# Patient Record
Sex: MALE | URBAN - METROPOLITAN AREA
[De-identification: names, ages, dates, MRNs, and addresses within clinical notes are randomized per-mention and may not be internally consistent; named-entity substitution may affect disease eponyms.]

---

## 2017-06-06 ENCOUNTER — HOSPITAL ENCOUNTER (OUTPATIENT)
Facility: CLINIC | Age: 57
Discharge: HOME | End: 2017-06-06
Attending: COLON & RECTAL SURGERY | Admitting: COLON & RECTAL SURGERY

## 2017-06-06 VITALS
OXYGEN SATURATION: 96 % | BODY MASS INDEX: 30.16 KG/M2 | WEIGHT: 199 LBS | RESPIRATION RATE: 16 BRPM | HEIGHT: 68 IN | SYSTOLIC BLOOD PRESSURE: 163 MMHG | DIASTOLIC BLOOD PRESSURE: 116 MMHG

## 2017-06-06 LAB — COLONOSCOPY: NORMAL

## 2017-06-06 PROCEDURE — 88305 TISSUE EXAM BY PATHOLOGIST: CPT | Mod: 26 | Performed by: COLON & RECTAL SURGERY

## 2017-06-06 PROCEDURE — 25000128 H RX IP 250 OP 636: Performed by: COLON & RECTAL SURGERY

## 2017-06-06 PROCEDURE — G0500 MOD SEDAT ENDO SERVICE >5YRS: HCPCS | Performed by: COLON & RECTAL SURGERY

## 2017-06-06 PROCEDURE — 25000125 ZZHC RX 250: Performed by: COLON & RECTAL SURGERY

## 2017-06-06 PROCEDURE — 45385 COLONOSCOPY W/LESION REMOVAL: CPT | Performed by: COLON & RECTAL SURGERY

## 2017-06-06 PROCEDURE — 88305 TISSUE EXAM BY PATHOLOGIST: CPT | Performed by: COLON & RECTAL SURGERY

## 2017-06-06 RX ORDER — ONDANSETRON 2 MG/ML
4 INJECTION INTRAMUSCULAR; INTRAVENOUS EVERY 6 HOURS PRN
Status: DISCONTINUED | OUTPATIENT
Start: 2017-06-06 | End: 2017-06-06 | Stop reason: HOSPADM

## 2017-06-06 RX ORDER — LIDOCAINE 40 MG/G
CREAM TOPICAL
Status: DISCONTINUED | OUTPATIENT
Start: 2017-06-06 | End: 2017-06-06 | Stop reason: HOSPADM

## 2017-06-06 RX ORDER — NALOXONE HYDROCHLORIDE 0.4 MG/ML
.1-.4 INJECTION, SOLUTION INTRAMUSCULAR; INTRAVENOUS; SUBCUTANEOUS
Status: DISCONTINUED | OUTPATIENT
Start: 2017-06-06 | End: 2017-06-06 | Stop reason: HOSPADM

## 2017-06-06 RX ORDER — ONDANSETRON 2 MG/ML
4 INJECTION INTRAMUSCULAR; INTRAVENOUS
Status: DISCONTINUED | OUTPATIENT
Start: 2017-06-06 | End: 2017-06-06 | Stop reason: HOSPADM

## 2017-06-06 RX ORDER — FENTANYL CITRATE 50 UG/ML
INJECTION, SOLUTION INTRAMUSCULAR; INTRAVENOUS PRN
Status: DISCONTINUED | OUTPATIENT
Start: 2017-06-06 | End: 2017-06-06 | Stop reason: HOSPADM

## 2017-06-06 RX ORDER — FLUMAZENIL 0.1 MG/ML
0.2 INJECTION, SOLUTION INTRAVENOUS
Status: DISCONTINUED | OUTPATIENT
Start: 2017-06-06 | End: 2017-06-06 | Stop reason: HOSPADM

## 2017-06-06 RX ORDER — ONDANSETRON 4 MG/1
4 TABLET, ORALLY DISINTEGRATING ORAL EVERY 6 HOURS PRN
Status: DISCONTINUED | OUTPATIENT
Start: 2017-06-06 | End: 2017-06-06 | Stop reason: HOSPADM

## 2017-06-06 NOTE — DISCHARGE INSTRUCTIONS
Understanding Colon and Rectal Polyps     The colon has a smooth lining composed of millions of cells.     The colon (also called the large intestine) is a muscular tube that forms the last part of the digestive tract. It absorbs water and stores food waste. The colon is about 4 to 6 feet long. The rectum is the last 6 inches of the colon. The colon and rectum have a smooth lining composed of millions of cells. Changes in these cells can lead to growths in the colon that can become cancerous and should be removed.     When the Colon Lining Changes  Changes that occur in the cells that line the colon or rectum can lead to growths called polyps. Over a period of years, polyps can turn cancerous. Removing polyps early may prevent cancer from ever forming.      Polyps  Polyps are fleshy clumps of tissue that form on the lining of the colon or rectum. Small polyps are usually benign (not cancerous). However, over time, cells in a polyp can change and become cancerous. The larger a polyp grows, the more likely this is to happen. Also, certain types of polyps known as adenomatous polyps are considered premalignant. This means that they will almost always become cancerous if they re not removed.          Cancer  Almost all colorectal cancers start when polyp cells begin growing abnormally. As a cancerous tumor grows, it may involve more and more of the colon or rectum. In time, cancer can also grow beyond the colon or rectum and spread to nearby organs or to glands called lymph nodes. The cells can also travel to other parts of the body. This is known as metastasis. The earlier a cancerous tumor is removed, the better the chance of preventing its spread.        2356-3372 [facility], [address] [place] [address], [place] [address]. All rights reserved. This information is not intended as a substitute for professional medical care. Always follow your healthcare professional's instructions.

## 2017-06-06 NOTE — IP AVS SNAPSHOT
MRN:[id number]                      After Visit Summary   6/6/2017    Fredy Post    MRN: [id number]           Thank you!     Thank you for choosing [facility] for your care. Our goal is always to provide you with excellent care. Hearing back from our patients is one way we can continue to improve our services. Please take a few minutes to complete the written survey that you may receive in the mail after you visit. If you would like to speak to someone directly about your visit please contact Patient Relations at [phone]. Thank you!          Patient Information     Date Of Birth          1960        About your hospital stay     You were admitted on:  June 6, 2017 You last received care in the:  [facility]    You were discharged on:  June 6, 2017       Who to Call     For medical emergencies, please call 911.  For non-urgent questions about your medical care, please call your primary care provider or clinic, None  For questions related to your surgery, please call your surgery clinic        Attending Provider     Provider Specialty    Dori Barrios MD Colon and Rectal Surgery       Primary Care Provider    Physician No Ref-Primary      Further instructions from your care team         Understanding Colon and Rectal Polyps     The colon has a smooth lining composed of millions of cells.     The colon (also called the large intestine) is a muscular tube that forms the last part of the digestive tract. It absorbs water and stores food waste. The colon is about 4 to 6 feet long. The rectum is the last 6 inches of the colon. The colon and rectum have a smooth lining composed of millions of cells. Changes in these cells can lead to growths in the colon that can become cancerous and should be removed.     When the Colon Lining Changes  Changes that occur in the cells that line the colon or rectum can lead to growths called polyps. Over a period of  "years, polyps can turn cancerous. Removing polyps early may prevent cancer from ever forming.      Polyps  Polyps are fleshy clumps of tissue that form on the lining of the colon or rectum. Small polyps are usually benign (not cancerous). However, over time, cells in a polyp can change and become cancerous. The larger a polyp grows, the more likely this is to happen. Also, certain types of polyps known as adenomatous polyps are considered premalignant. This means that they will almost always become cancerous if they re not removed.          Cancer  Almost all colorectal cancers start when polyp cells begin growing abnormally. As a cancerous tumor grows, it may involve more and more of the colon or rectum. In time, cancer can also grow beyond the colon or rectum and spread to nearby organs or to glands called lymph nodes. The cells can also travel to other parts of the body. This is known as metastasis. The earlier a cancerous tumor is removed, the better the chance of preventing its spread.        7455-6590 [place] [address]. All rights reserved. This information is not intended as a substitute for professional medical care. Always follow your healthcare professional's instructions.    Pending Results     No orders found from 6/4/2017 to 6/7/2017.            Admission Information     Date & Time Provider Department Dept. Phone    6/6/2017 Dori Barrios MD [facility] Endoscopy [phone]      Your Vitals Were     Blood Pressure Respirations Height Weight Pulse Oximetry BMI (Body Mass Index)    153/113 14 1.727 m (5' 8\") 90.3 kg (199 lb) 95% 30.26 kg/m2      [employer] Information     [email or web address] lets you send messages to your doctor, view your test results, renew your prescriptions, schedule appointments and more. To sign up, go to [email or web address] . Click on \"Log in\" on the left side of the screen, which will take you to the Welcome page. Then click on \"Sign up " "Now\" on the right side of the page.     You will be asked to enter the access code listed below, as well as some personal information. Please follow the directions to create your username and password.     Your access code is: [email or web address]  Expires: 2017 11:17 AM     Your access code will  in 90 days. If you need help or a new code, please call your [facility] or [phone].        Care EveryWhere ID     This is your Care EveryWhere ID. This could be used by other organizations to access your [facility] medical records  [id number]           Review of your medicines      CONTINUE these medicines which have NOT CHANGED        Dose / Directions    ACYCLOVIR PO        Take by mouth daily as needed   Refills:  0                Protect others around you: Learn how to safely use, store and throw away your medicines at www.disposemymeds.org.             Medication List: This is a list of all your medications and when to take them. Check marks below indicate your daily home schedule. Keep this list as a reference.      Medications           Morning Afternoon Evening Bedtime As Needed    ACYCLOVIR PO   Take by mouth daily as needed                                  "

## 2017-06-06 NOTE — H&P
Pre-Endoscopy History and Physical     Fredy Post MRN# [id number]   YOB: 1960 Age: 56 year old     Date of Procedure: 6/6/2017  Primary care provider: No Ref-Primary, Physician  Type of Endoscopy: colonoscopy  Reason for Procedure: screening  Type of Anesthesia Anticipated: Moderate Sedation    HPI:    Fredy is a 56 year old male who will be undergoing the above procedure.      A history and physical has been performed. The patient's medications and allergies have been reviewed. The risks and benefits of the procedure and the sedation options and risks were discussed with the patient.  All questions were answered and informed consent was obtained.      He denies a personal or family history of anesthesia complications or bleeding disorders.     No Known Allergies     Prior to Admission Medications   Prescriptions Last Dose Informant Patient Reported? Taking?   ACYCLOVIR PO Past Month  Yes Yes   Sig: Take by mouth daily as needed      Facility-Administered Medications: None       There is no problem list on file for this patient.       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26     Genital herpes      Schwannoma     diagnosed at age 32, excision performed        Past Surgical History:   Procedure Laterality Date     BACK SURGERY  04/23/2002       Social History   Substance Use Topics     Smoking status: Former Smoker     Types: Cigarettes     Quit date: 1987     Smokeless tobacco: Not on file     Alcohol use Yes      Comment: wine - about 2 drinks per week       Family History   Problem Relation Age of Onset     Hypertension Mother      Heart Failure Father      Hypertension Father      Ovarian Cancer Sister      CEREBROVASCULAR DISEASE Maternal Grandmother      DIABETES Paternal Grandfather      Colon Cancer No family hx of        REVIEW OF SYSTEMS:     5 point ROS negative except as noted above in HPI, including Gen., Resp., CV, GI &   "system review.      PHYSICAL EXAM:   Ht 1.727 m (5' 8\")  Wt 90.3 kg (199 lb)  BMI 30.26 kg/m2 Estimated body mass index is 30.26 kg/(m^2) as calculated from the following:    Height as of this encounter: 1.727 m (5' 8\").    Weight as of this encounter: 90.3 kg (199 lb).   GENERAL APPEARANCE: healthy and alert  MENTAL STATUS: alert  AIRWAY EXAM: Mallampatti Class II (visualization of the soft palate, fauces, and uvula)  RESP: lungs clear to auscultation - no rales, rhonchi or wheezes  CV: regular rates and rhythm      DIAGNOSTICS:    Not indicated      IMPRESSION   ASA Class 2 - Mild systemic disease        PLAN:       Plan for colonoscopy. We discussed the risks, benefits and alternatives and the patient wished to proceed.    The above has been forwarded to the consulting provider.      Signed Electronically by: Dori Barrios MD  June 6, 2017    "

## 2017-06-06 NOTE — OP NOTE
See Provation Note In Chart    Dori Barrios MD  [facility]  Office Phone # [phone]

## 2017-06-07 LAB — COPATH REPORT: NORMAL

## 2025-03-16 ENCOUNTER — HOSPITAL ENCOUNTER (INPATIENT)
Facility: CLINIC | Age: 65
LOS: 2 days | Discharge: HOME | End: 2025-03-18
Attending: PHYSICIAN ASSISTANT | Admitting: STUDENT IN AN ORGANIZED HEALTH CARE EDUCATION/TRAINING PROGRAM
Payer: COMMERCIAL

## 2025-03-16 ENCOUNTER — APPOINTMENT (OUTPATIENT)
Dept: GENERAL RADIOLOGY | Facility: CLINIC | Age: 65
End: 2025-03-16
Attending: PHYSICIAN ASSISTANT
Payer: COMMERCIAL

## 2025-03-16 DIAGNOSIS — I21.4 NSTEMI (NON-ST ELEVATED MYOCARDIAL INFARCTION) (H): ICD-10-CM

## 2025-03-16 DIAGNOSIS — R55 NEAR SYNCOPE: ICD-10-CM

## 2025-03-16 LAB
ALBUMIN SERPL BCG-MCNC: 4.1 G/DL (ref 3.5–5.2)
ALP SERPL-CCNC: 149 U/L (ref 40–150)
ALT SERPL W P-5'-P-CCNC: 43 U/L (ref 0–70)
ANION GAP SERPL CALCULATED.3IONS-SCNC: 12 MMOL/L (ref 7–15)
AST SERPL W P-5'-P-CCNC: 55 U/L (ref 0–45)
ATRIAL RATE - MUSE: 103 BPM
BASOPHILS # BLD AUTO: 0.1 10E3/UL (ref 0–0.2)
BASOPHILS NFR BLD AUTO: 0 %
BILIRUB SERPL-MCNC: 0.8 MG/DL
BUN SERPL-MCNC: 14.2 MG/DL (ref 8–23)
CALCIUM SERPL-MCNC: 8.9 MG/DL (ref 8.8–10.4)
CHLORIDE SERPL-SCNC: 104 MMOL/L (ref 98–107)
CREAT SERPL-MCNC: 1.42 MG/DL (ref 0.67–1.17)
D DIMER PPP FEU-MCNC: 0.45 UG/ML FEU (ref 0–0.5)
DIASTOLIC BLOOD PRESSURE - MUSE: NORMAL MMHG
EGFRCR SERPLBLD CKD-EPI 2021: 56 ML/MIN/1.73M2
EOSINOPHIL # BLD AUTO: 0.1 10E3/UL (ref 0–0.7)
EOSINOPHIL NFR BLD AUTO: 1 %
ERYTHROCYTE [DISTWIDTH] IN BLOOD BY AUTOMATED COUNT: 12.6 % (ref 10–15)
ETHANOL SERPL-MCNC: <0.01 G/DL
GLUCOSE SERPL-MCNC: 152 MG/DL (ref 70–99)
HCO3 SERPL-SCNC: 23 MMOL/L (ref 22–29)
HCT VFR BLD AUTO: 44.3 % (ref 40–53)
HGB BLD-MCNC: 16.1 G/DL (ref 13.3–17.7)
HOLD SPECIMEN: NORMAL
IMM GRANULOCYTES # BLD: 0.1 10E3/UL
IMM GRANULOCYTES NFR BLD: 1 %
INTERPRETATION ECG - MUSE: NORMAL
LYMPHOCYTES # BLD AUTO: 2.9 10E3/UL (ref 0.8–5.3)
LYMPHOCYTES NFR BLD AUTO: 23 %
MCH RBC QN AUTO: 32 PG (ref 26.5–33)
MCHC RBC AUTO-ENTMCNC: 36.3 G/DL (ref 31.5–36.5)
MCV RBC AUTO: 88 FL (ref 78–100)
MONOCYTES # BLD AUTO: 0.6 10E3/UL (ref 0–1.3)
MONOCYTES NFR BLD AUTO: 4 %
NEUTROPHILS # BLD AUTO: 9.1 10E3/UL (ref 1.6–8.3)
NEUTROPHILS NFR BLD AUTO: 71 %
NRBC # BLD AUTO: 0 10E3/UL
NRBC BLD AUTO-RTO: 0 /100
NT-PROBNP SERPL-MCNC: 372 PG/ML (ref 0–900)
P AXIS - MUSE: 51 DEGREES
PLATELET # BLD AUTO: 239 10E3/UL (ref 150–450)
POTASSIUM SERPL-SCNC: 3.8 MMOL/L (ref 3.4–5.3)
PR INTERVAL - MUSE: 134 MS
PROT SERPL-MCNC: 7.3 G/DL (ref 6.4–8.3)
QRS DURATION - MUSE: 82 MS
QT - MUSE: 366 MS
QTC - MUSE: 479 MS
R AXIS - MUSE: 34 DEGREES
RBC # BLD AUTO: 5.03 10E6/UL (ref 4.4–5.9)
SODIUM SERPL-SCNC: 139 MMOL/L (ref 135–145)
SYSTOLIC BLOOD PRESSURE - MUSE: NORMAL MMHG
T AXIS - MUSE: 66 DEGREES
TROPONIN T SERPL HS-MCNC: 245 NG/L
TROPONIN T SERPL HS-MCNC: 288 NG/L
TROPONIN T SERPL HS-MCNC: 288 NG/L
UFH PPP CHRO-ACNC: 0.37 IU/ML
VENTRICULAR RATE- MUSE: 103 BPM
WBC # BLD AUTO: 12.9 10E3/UL (ref 4–11)

## 2025-03-16 PROCEDURE — 96374 THER/PROPH/DIAG INJ IV PUSH: CPT | Mod: 59

## 2025-03-16 PROCEDURE — 84155 ASSAY OF PROTEIN SERUM: CPT | Performed by: PHYSICIAN ASSISTANT

## 2025-03-16 PROCEDURE — 36415 COLL VENOUS BLD VENIPUNCTURE: CPT | Performed by: EMERGENCY MEDICINE

## 2025-03-16 PROCEDURE — 36415 COLL VENOUS BLD VENIPUNCTURE: CPT | Performed by: PHYSICIAN ASSISTANT

## 2025-03-16 PROCEDURE — 84484 ASSAY OF TROPONIN QUANT: CPT | Performed by: STUDENT IN AN ORGANIZED HEALTH CARE EDUCATION/TRAINING PROGRAM

## 2025-03-16 PROCEDURE — 85048 AUTOMATED LEUKOCYTE COUNT: CPT | Performed by: EMERGENCY MEDICINE

## 2025-03-16 PROCEDURE — 250N000013 HC RX MED GY IP 250 OP 250 PS 637: Performed by: STUDENT IN AN ORGANIZED HEALTH CARE EDUCATION/TRAINING PROGRAM

## 2025-03-16 PROCEDURE — 82077 ASSAY SPEC XCP UR&BREATH IA: CPT | Performed by: PHYSICIAN ASSISTANT

## 2025-03-16 PROCEDURE — 71046 X-RAY EXAM CHEST 2 VIEWS: CPT

## 2025-03-16 PROCEDURE — 99285 EMERGENCY DEPT VISIT HI MDM: CPT | Mod: 25

## 2025-03-16 PROCEDURE — 250N000011 HC RX IP 250 OP 636: Performed by: PHYSICIAN ASSISTANT

## 2025-03-16 PROCEDURE — 36415 COLL VENOUS BLD VENIPUNCTURE: CPT | Performed by: STUDENT IN AN ORGANIZED HEALTH CARE EDUCATION/TRAINING PROGRAM

## 2025-03-16 PROCEDURE — 85520 HEPARIN ASSAY: CPT | Performed by: STUDENT IN AN ORGANIZED HEALTH CARE EDUCATION/TRAINING PROGRAM

## 2025-03-16 PROCEDURE — 99223 1ST HOSP IP/OBS HIGH 75: CPT | Performed by: STUDENT IN AN ORGANIZED HEALTH CARE EDUCATION/TRAINING PROGRAM

## 2025-03-16 PROCEDURE — 83880 ASSAY OF NATRIURETIC PEPTIDE: CPT | Performed by: PHYSICIAN ASSISTANT

## 2025-03-16 PROCEDURE — 85004 AUTOMATED DIFF WBC COUNT: CPT | Performed by: EMERGENCY MEDICINE

## 2025-03-16 PROCEDURE — 93005 ELECTROCARDIOGRAM TRACING: CPT

## 2025-03-16 PROCEDURE — 85379 FIBRIN DEGRADATION QUANT: CPT | Performed by: PHYSICIAN ASSISTANT

## 2025-03-16 PROCEDURE — 250N000013 HC RX MED GY IP 250 OP 250 PS 637: Performed by: PHYSICIAN ASSISTANT

## 2025-03-16 PROCEDURE — 85025 COMPLETE CBC W/AUTO DIFF WBC: CPT | Performed by: PHYSICIAN ASSISTANT

## 2025-03-16 PROCEDURE — 210N000002 HC R&B HEART CARE

## 2025-03-16 PROCEDURE — 84484 ASSAY OF TROPONIN QUANT: CPT | Performed by: PHYSICIAN ASSISTANT

## 2025-03-16 RX ORDER — ONDANSETRON 4 MG/1
4 TABLET, ORALLY DISINTEGRATING ORAL EVERY 6 HOURS PRN
Status: DISCONTINUED | OUTPATIENT
Start: 2025-03-16 | End: 2025-03-17

## 2025-03-16 RX ORDER — CALCIUM CARBONATE 500 MG/1
1000 TABLET, CHEWABLE ORAL 4 TIMES DAILY PRN
Status: DISCONTINUED | OUTPATIENT
Start: 2025-03-16 | End: 2025-03-18 | Stop reason: HOSPADM

## 2025-03-16 RX ORDER — ATORVASTATIN CALCIUM 40 MG/1
40 TABLET, FILM COATED ORAL EVERY EVENING
Status: DISCONTINUED | OUTPATIENT
Start: 2025-03-16 | End: 2025-03-18 | Stop reason: HOSPADM

## 2025-03-16 RX ORDER — AMOXICILLIN 250 MG
2 CAPSULE ORAL 2 TIMES DAILY PRN
Status: DISCONTINUED | OUTPATIENT
Start: 2025-03-16 | End: 2025-03-18 | Stop reason: HOSPADM

## 2025-03-16 RX ORDER — FAMOTIDINE 20 MG/1
20 TABLET, FILM COATED ORAL 2 TIMES DAILY
Status: DISCONTINUED | OUTPATIENT
Start: 2025-03-16 | End: 2025-03-18 | Stop reason: HOSPADM

## 2025-03-16 RX ORDER — ASPIRIN 81 MG/1
81 TABLET, CHEWABLE ORAL DAILY
Status: DISCONTINUED | OUTPATIENT
Start: 2025-03-17 | End: 2025-03-17

## 2025-03-16 RX ORDER — LISINOPRIL 10 MG/1
10 TABLET ORAL DAILY
Status: ON HOLD | COMMUNITY
End: 2025-03-17

## 2025-03-16 RX ORDER — HEPARIN SODIUM 10000 [USP'U]/100ML
0-5000 INJECTION, SOLUTION INTRAVENOUS CONTINUOUS
Status: DISCONTINUED | OUTPATIENT
Start: 2025-03-16 | End: 2025-03-17

## 2025-03-16 RX ORDER — AMOXICILLIN 250 MG
1 CAPSULE ORAL 2 TIMES DAILY PRN
Status: DISCONTINUED | OUTPATIENT
Start: 2025-03-16 | End: 2025-03-18 | Stop reason: HOSPADM

## 2025-03-16 RX ORDER — LIDOCAINE 40 MG/G
CREAM TOPICAL
Status: DISCONTINUED | OUTPATIENT
Start: 2025-03-16 | End: 2025-03-18 | Stop reason: HOSPADM

## 2025-03-16 RX ORDER — HYDRALAZINE HYDROCHLORIDE 20 MG/ML
10 INJECTION INTRAMUSCULAR; INTRAVENOUS EVERY 6 HOURS PRN
Status: DISCONTINUED | OUTPATIENT
Start: 2025-03-16 | End: 2025-03-17

## 2025-03-16 RX ORDER — ACETAMINOPHEN 650 MG/1
650 SUPPOSITORY RECTAL EVERY 4 HOURS PRN
Status: DISCONTINUED | OUTPATIENT
Start: 2025-03-16 | End: 2025-03-18 | Stop reason: HOSPADM

## 2025-03-16 RX ORDER — PROCHLORPERAZINE MALEATE 10 MG
10 TABLET ORAL EVERY 6 HOURS PRN
Status: DISCONTINUED | OUTPATIENT
Start: 2025-03-16 | End: 2025-03-18 | Stop reason: HOSPADM

## 2025-03-16 RX ORDER — ONDANSETRON 2 MG/ML
4 INJECTION INTRAMUSCULAR; INTRAVENOUS EVERY 6 HOURS PRN
Status: DISCONTINUED | OUTPATIENT
Start: 2025-03-16 | End: 2025-03-17

## 2025-03-16 RX ORDER — ACETAMINOPHEN 325 MG/1
650 TABLET ORAL EVERY 4 HOURS PRN
Status: DISCONTINUED | OUTPATIENT
Start: 2025-03-16 | End: 2025-03-17

## 2025-03-16 RX ORDER — LISINOPRIL 10 MG/1
10 TABLET ORAL DAILY
Status: DISCONTINUED | OUTPATIENT
Start: 2025-03-17 | End: 2025-03-18 | Stop reason: HOSPADM

## 2025-03-16 RX ORDER — NITROGLYCERIN 0.4 MG/1
0.4 TABLET SUBLINGUAL EVERY 5 MIN PRN
Status: DISCONTINUED | OUTPATIENT
Start: 2025-03-16 | End: 2025-03-17

## 2025-03-16 RX ADMIN — ACETAMINOPHEN 650 MG: 325 TABLET, FILM COATED ORAL at 22:01

## 2025-03-16 RX ADMIN — METOPROLOL TARTRATE 12.5 MG: 25 TABLET, FILM COATED ORAL at 21:56

## 2025-03-16 RX ADMIN — HEPARIN SODIUM 1150 UNITS/HR: 10000 INJECTION, SOLUTION INTRAVENOUS at 17:18

## 2025-03-16 RX ADMIN — NITROGLYCERIN 0.4 MG: 0.4 TABLET SUBLINGUAL at 17:17

## 2025-03-16 RX ADMIN — FAMOTIDINE 20 MG: 20 TABLET, FILM COATED ORAL at 21:56

## 2025-03-16 RX ADMIN — ATORVASTATIN CALCIUM 40 MG: 40 TABLET, FILM COATED ORAL at 21:56

## 2025-03-16 ASSESSMENT — ACTIVITIES OF DAILY LIVING (ADL)
ADLS_ACUITY_SCORE: 41
ADLS_ACUITY_SCORE: 41
ADLS_ACUITY_SCORE: 18
ADLS_ACUITY_SCORE: 18
ADLS_ACUITY_SCORE: 41
ADLS_ACUITY_SCORE: 41
ADLS_ACUITY_SCORE: 18
ADLS_ACUITY_SCORE: 18

## 2025-03-16 ASSESSMENT — COLUMBIA-SUICIDE SEVERITY RATING SCALE - C-SSRS
2. HAVE YOU ACTUALLY HAD ANY THOUGHTS OF KILLING YOURSELF IN THE PAST MONTH?: NO
6. HAVE YOU EVER DONE ANYTHING, STARTED TO DO ANYTHING, OR PREPARED TO DO ANYTHING TO END YOUR LIFE?: NO
1. IN THE PAST MONTH, HAVE YOU WISHED YOU WERE DEAD OR WISHED YOU COULD GO TO SLEEP AND NOT WAKE UP?: NO

## 2025-03-16 NOTE — Clinical Note
Stent deployed in the left anterior descending. Max pressure = 12 atilio. Total duration = 24 seconds.

## 2025-03-16 NOTE — ED NOTES
"[facility]  ED Nurse Handoff Report    ED Chief complaint: Chest Pain      ED Diagnosis:   Final diagnoses:   NSTEMI (non-ST elevated myocardial infarction) (H)   Near syncope       Code Status: TBD    Allergies: No Known Allergies    Patient Story: syncope  Focused Assessment:  pt had a syncopal episode while out eating today. Pt states his left arm and chest were hurting since yesterday. Pt doesn't recall much of the syncope but did urinate on self. Pt was recently started on lisinopril     Treatments and/or interventions provided: see MAR  Patient's response to treatments and/or interventions: good    To be done/followed up on inpatient unit:   monitor    Does this patient have any cognitive concerns?:  none known    Activity level - Baseline/Home:  Independent  Activity Level - Current:   Independent    Patient's Preferred language: English   Needed?: No    Isolation: None  Infection: Not Applicable  Patient tested for COVID 19 prior to admission: NO  Bariatric?: No    Vital Signs:   Vitals:    03/16/25 1600 03/16/25 1615 03/16/25 1700   BP: (!) 146/99  (!) 151/96   Pulse: 101 103 93   Resp: 16 10 21   Temp: 98.3  F (36.8  C)     TempSrc: Oral     SpO2: 96% 97% 98%   Weight: 95 kg (209 lb 7 oz)     Height: 1.727 m (5' 8\")         Cardiac Rhythm:     Was the PSS-3 completed:   Yes  What interventions are required if any?               Family Comments: none here  OBS brochure/video discussed/provided to patient/family: No                  For the majority of the shift this patient's behavior was Green.   Behavioral interventions performed were NA.    ED NURSE PHONE NUMBER: *[phone]         "

## 2025-03-16 NOTE — H&P
"[facility]  History and Physical - Hospitalist Service       Date of Admission:  3/16/2025  PRIMARY CARE PROVIDER:    Caitlyn Clements    Assessment & Plan   Fredy Chow is a 64 year old male with a past medical history significant for HTN who was admitted on 3/16/25 for NSTEMI.     NSTEMI  Chest Pain  Brief Syncope  HTN  Presented with one day of left arm pain that transitioned into left upper chest, neck, arm and jaw pain associated with diaphoresis and lightheadedness. Had a few seconds of LOC. Did not fall.  In the ED, he was hypertensive and slightly tachycardic. Labs were remarkable for Cr of 1.42 and Trop of 288. Was given 324mg Asa and 5mg NTG with EMS. Started on Heparin ggt in the ED.     - Tele     - Trop: 288 > 245     - 3/3/25 Lipid Panel:    - Cholesterol 172, Triglyceride: 158 , LDL: 108     - TTE ordered     - Continue Heparin ggt  - Continue home Lisinopril   - Start Asa 81mg daily   - Start Metoprolol 12.5mg BID   - Start Atorvastatin 40mg daily     - Cardiology consulted     Likely CKD  - Cr: 1.42    - Baseline: 1.30-1.40   - Continue to monitor       Clinically Significant Risk Factors Present on Admission                   # Hypertension: Home medication list includes antihypertensive(s)           # Obesity: Estimated body mass index is 31.75 kg/m  as calculated from the following:    Height as of this encounter: 1.727 m (5' 8\").    Weight as of this encounter: 94.7 kg (208 lb 12.8 oz).                   Diet: NPO for Procedure/Surgery per Anesthesia Guidelines Except for: Meds; Clear liquids before procedure/surgery: ADULT (Age GREATER than or Equal to 18 years) - Clear liquids 2 hours before procedure/surgery  Combination Diet Low Saturated Fat Na <2400mg Diet, No Caffeine Diet  DVT Prophylaxis: Heparin ggt  Gifford Catheter: Not present  Lines: None     Cardiac Monitoring: ACTIVE order. Indication: Chest pain/ ACS rule out (24 hours)  Code Status: Full " Code         Disposition Plan      Expected Discharge Date: 03/18/2025             Entered: Manju Puga MD 03/16/2025, 7:19 PM       Medically Ready for Discharge: Anticipated in 2-4 Days        Manju Puga MD  Hosptialist Service  [facility]  Securely message with the Vocera Web Console (learn more here)  ______________________________________________________________    Chief Complaint   Left arm pain    History is obtained from the patient and EMR.      History of Present Illness   Fredy Chow is a 64 year old male with a past medical history significant for HTN who presented to the ED on 3/16/25 for left arm pain.     The patient states that he developed left arm pain on 3/15/25. It went away and then reoccurred before going to a restaurant this morning. He states that this morning, he has pain was in his upper left chest, left arm, left neck and jaw.  At the restaurant, he felt like he had to defecate. He developed diaphoresis with lightheadedness. Had a few seconds of LOC. Did not fall. EMS was called. Was given 324mg of Asa and 5mg NTG with EMS.     In the ED:   Vitals upon arrival:   Temp:  98.3F, BP: 146/99, HR: 101, RR: 16, Spo2: 96% on RA    Labs:  Na: 139, K: 3.8, Cl: 104, Co2: 23  BUN: 14.2, Cr: 1.42     WBC: 12.9, Hgb: 16.1, Plt: 239     LFTs: WNL     Trop: 288 > 245     CXR: Negative     EKG: Sinus Tachycardia. Possible Inferior infarct , age undetermined     Upon evaluation in the ED, the patient states that he is doing okay. The chest pain is intermittent. Does not feel the NTG is helping. Denies SOB, abdominal pain, nausea, vomiting.     Past Medical History    I have reviewed this patient's medical history and updated it with pertinent information if needed.   Past Medical History:   Diagnosis Date    Anal condyloma     diagnosed at approx age 26    Genital herpes     Schwannoma     diagnosed at age 32, excision performed       Prior to  Admission Medications   Prior to Admission Medications   Prescriptions Last Dose Informant Patient Reported? Taking?   lisinopril (ZESTRIL) 10 MG tablet 3/16/2025 Self Yes Yes   Sig: Take 10 mg by mouth daily.      Facility-Administered Medications: None     Allergies   No Known Allergies    Physical Exam   Vital Signs: Temp: 97.8  F (36.6  C) Temp src: Oral BP: (!) 157/103 Pulse: 103   Resp: 16 SpO2: 98 % O2 Device: None (Room air)    Weight: 208 lbs 12.8 oz    Constitutional: Awake, alert, cooperative, no apparent distress.  ENT: Normocephalic, without obvious abnormality, atraumatic, oral pharynx with moist mucus membranes.  Eyes pupils are equal, round and reactive to light; extra occular movements intact.  Normal sclera.    Pulmonary: No increased work of breathing, good air exchange, clear to auscultation bilaterally, no crackles or wheezing.  Cardiovascular: Regular rate and rhythm, normal S1 and S2  GI: Normal bowel sounds, soft, non-distended, non-tender.    Skin/Integumen: Visualized skin appeared clear.  Neuro: CN II-XII grossly intact.   Psych:  Alert and oriented x 3. Normal affect.  Extremities: No lower extremity edema noted  Chest: Chest pain is non-reproducible on exam    Medical Decision Making       75 MINUTES SPENT BY ME on the date of service doing chart review, history, exam, documentation & further activities per the note.         Data   Data reviewed today: I reviewed all medications, new labs and imaging results over the last 24 hours.       I have personally reviewed the following data over the past 24 hrs:    12.9 (H)  \   16.1   / 239     139 104 14.2 /  152 (H)   3.8 23 1.42 (H) \     ALT: 43 AST: 55 (H) AP: 149 TBILI: 0.8   ALB: 4.1 TOT PROTEIN: 7.3 LIPASE: N/A     Trop: 245 (HH) BNP: 372     INR:  N/A PTT:  N/A   D-dimer:  0.45 Fibrinogen:  N/A       Imaging results reviewed over the past 24 hrs:   Recent Results (from the past 24 hours)   Chest XR,  PA & LAT    Narrative    EXAM: XR  CHEST 2 VIEWS  LOCATION: [facility]  DATE: 3/16/2025    INDICATION: Syncope, chest pain  COMPARISON: None.      Impression    IMPRESSION: Negative chest.

## 2025-03-16 NOTE — Clinical Note
The first balloon was inserted into the left anterior descending.Max pressure = 12 atilio. Total duration = 17 seconds.

## 2025-03-16 NOTE — ED PROVIDER NOTES
"  Emergency Department Note      History of Present Illness     Chief Complaint   Chest Pain      HPI   Fredy Chow is a 64 year old male with a history of hypertension who presents to the Emergency Department for chest pain. The patient reports he developed pain in his left arm yesterday (3/15/25) that recurred today before going to a restaurant. At the restaurant he felt like he had to defecate and developed cold sweats when he suddenly had an episode of near syncope but doesn't think he fully lost consciousness but is unsure. Did not fall down, no seizure activity.  Upon arriving to the ED, he endorses left-sided chest and arm pain that has been coming and going.  He denies abdominal pain, numbness in the extremities, dizziness, current headache or neck pain.  There is no radiation to the back.  Denies vomiting, nausea, shortness of breath, headache, leg edema/calf pain, hemoptysis, recent surgery/hospitalization, recent falls, or history of MI or CVA.     Per EMS, the patient received 324 mg aspirin and 1 nitroglycerin in route.     Independent Historian   EMS as detailed above.    Review of External Notes   I reviewed the patient's 3/3/25 office visit note in which he was seen for a regular physical. At this time, he was found to be hypertensive and was started on 10 mg lisinopril.      Past Medical History     Medical History and Problem List   Anal condyloma  Genital herpes  GERD  Hypertension   Nephrolithiasis   Schwannoma    Medications   Lisinopril     Surgical History   Unspecified back surgery     Physical Exam     Patient Vitals for the past 24 hrs:   BP Temp Temp src Pulse Resp SpO2 Height Weight   03/16/25 1700 (!) 151/96 -- -- 93 21 98 % -- --   03/16/25 1615 -- -- -- 103 10 97 % -- --   03/16/25 1600 (!) 146/99 98.3  F (36.8  C) Oral 101 16 96 % 1.727 m (5' 8\") 95 kg (209 lb 7 oz)     Physical Exam  General: Awake, alert, non-toxic.  Head:  Scalp is NC/AT  Eyes:  Conjunctiva normal, " PERRL  Neck:  Normal range of motion without rigidity.  CV:  Regular rate and rhythm    No pathologic murmur, rubs, or gallops.  Resp:  Breath sounds are clear bilaterally    Non-labored, no retractions or accessory muscle use  Abdomen: Abdomen is soft, no distension, no tenderness, no masses.   MS:  No lower extremity edema/swelling. Extremities without joint swelling or redness.  Skin:  Warm and dry, No rash or lesions noted. 2+ radial and DP/PT pulses BL.  All extremities warm and well perfused w/good cap refill.  Neuro:  Alert and oriented.  GCS 15 Moves all extremities normal.  Normal sensation to touch BL in all extremities. No facial asymmetry. Gait normal.  Psych:  Awake. Alert. Normal affect. Appropriate interactions.      Diagnostics     Lab Results   Labs Ordered and Resulted from Time of ED Arrival to Time of ED Departure   COMPREHENSIVE METABOLIC PANEL - Abnormal       Result Value    Sodium 139      Potassium 3.8      Carbon Dioxide (CO2) 23      Anion Gap 12      Urea Nitrogen 14.2      Creatinine 1.42 (*)     GFR Estimate 56 (*)     Calcium 8.9      Chloride 104      Glucose 152 (*)     Alkaline Phosphatase 149      AST 55 (*)     ALT 43      Protein Total 7.3      Albumin 4.1      Bilirubin Total 0.8     TROPONIN T, HIGH SENSITIVITY - Abnormal    Troponin T, High Sensitivity 288 (*)    CBC WITH PLATELETS AND DIFFERENTIAL - Abnormal    WBC Count 12.9 (*)     RBC Count 5.03      Hemoglobin 16.1      Hematocrit 44.3      MCV 88      MCH 32.0      MCHC 36.3      RDW 12.6      Platelet Count 239      % Neutrophils 71      % Lymphocytes 23      % Monocytes 4      % Eosinophils 1      % Basophils 0      % Immature Granulocytes 1      NRBCs per 100 WBC 0      Absolute Neutrophils 9.1 (*)     Absolute Lymphocytes 2.9      Absolute Monocytes 0.6      Absolute Eosinophils 0.1      Absolute Basophils 0.1      Absolute Immature Granulocytes 0.1      Absolute NRBCs 0.0     D DIMER QUANTITATIVE - Normal    D-Dimer  Quantitative 0.45     ETHYL ALCOHOL LEVEL - Normal    Alcohol ethyl <0.01     N TERMINAL PRO BNP OUTPATIENT   TROPONIN T, HIGH SENSITIVITY       Imaging   Chest XR,  PA & LAT   Final Result   IMPRESSION: Negative chest.          EKG   ECG results from 03/16/25   EKG 12-lead, tracing only     Value    Systolic Blood Pressure     Diastolic Blood Pressure     Ventricular Rate 103    Atrial Rate 103    AZ Interval 134    QRS Duration 82        QTc 479    P Axis 51    R AXIS 34    T Axis 66    Interpretation ECG      Sinus tachycardia  Possible Inferior infarct , age undetermined  Abnormal ECG  When compared with ECG of 21-Mar-2010 13:08,  Borderline criteria for Inferior infarct are now Present  QT has lengthened  Confirmed by GENERATED REPORT, COMPUTER ([id number]),  MART LI ([id number]) on 3/16/2025 4:11:10 PM. Read by ED physician Dr. Javon caldwell.     ECG taken at 1659, ECG read at 1712 by Dr. Cooper.  Sinus tachycardia  Nonspecific ST abnormality  Abnormal ECG   No significant change as compared to prior, dated 3/16/25, 1613.  Rate 104 bpm. AZ interval 136 ms. QRS duration 84 ms. QT/QTc 356/468 ms. P-R-T axes 46 63 79.    Independent Interpretation   I independently reviewed the imaging chest xray clear.  No cardiomegaly, infiltrate, pneumothorax or mediastinal widening..No edema or effusions.  Thank you so much have a good night    ED Course      Medications Administered   Medications   nitroGLYcerin (NITROSTAT) sublingual tablet 0.4 mg (0.4 mg Sublingual $Given 3/16/25 1717)   heparin 25,000 units in 0.45% NaCl 250 mL ANTICOAGULANT infusion (1,150 Units/hr Intravenous $New Bag 3/16/25 1718)   heparin loading dose for LOW INTENSITY TREATMENT * Give BEFORE starting heparin infusion (5,700 Units Intravenous $Given 3/16/25 1719)       Procedures   None     Discussion of Management   Admitting HospitalistEdd    ED Course   ED Course as of 03/16/25 1848   Sun Mar 16, 2025   1643 I evaluated the  patient, obtained history, and performed a physical exam as detailed above.    1700 I rechecked the patient and updated them on the plan of care.    1726 I spoke with hospitalist Dr. Puga.    1730 I rechecked the patient and updated them on the plan of care.        Additional Documentation  None    Medical Decision Making / Diagnosis     CMS Diagnoses: None    MIPS       None    MDM   Fredy Chow is a 64 year old male with history of hypertension who presents with a few intermittent episodes of left-sided upper chest pain and shoulder pain since last night.  Recurred again while he was out at a restaurant and had a near syncopal episode but did not fall or suffer injury.  Here his initial EKG showed some mild ST elevation in the precordial leads consistent with likely early repull and not meeting STEMI criteria.  There were no reciprocal changes.  This was repeated with apparent resolution of this finding and the patient is pain-free.  He received aspirin 324 and a dose of nitroglycerin from EMS.  Was started on heparin and got another dose of nitroglycerin here and remains pain-free.  I considered alternative etiologies such as pulmonary embolism however his D-dimer is negative and he is low risk by Wells for PE essentially ruling this out.  Chest x-ray is clear.  There is no evidence of CHF.  There is no mediastinal widening symptoms intermittent no other high risk findings to suggest aortic dissection and in conjunction with normal D-dimer as well I think this is extremely unlikely.  His abdominal exam is benign and nontender not suggestive of referred pain.  There is nothing to suggest subarachnoid hemorrhage or stroke.  No indication for emergent Cath Lab activation or cardiology consult.  We will admit him to the hospital for NSTEMI.  Discussed with hospitalist who agrees to accept.  Disposition   The patient was admitted to the hospital.     Diagnosis     ICD-10-CM    1. NSTEMI (non-ST  elevated myocardial infarction) (H)  I21.4       2. Near syncope  R55            Discharge Medications   New Prescriptions    No medications on file     Scribe Disclosure:  I, Su Uribe, am serving as a scribe at 4:55 PM on 3/16/2025 to document services personally performed by Nils Martinez PA-C based on my observations and the provider's statements to me.        Nils Martinez PA-C  03/16/25 1903

## 2025-03-16 NOTE — ED NOTES
Pt states that we can provide any information to his roommate, Senia Kramer who is listed in his emergency contacts

## 2025-03-16 NOTE — Clinical Note
The first balloon was inserted into the left anterior descending.Max pressure = 8 atilio. Total duration = 15 seconds.     Max pressure = 10 atilio. Total duration = 17 seconds.    Balloon reinflated a second time: Max pressure = 10 atilio. Total duration = 17 seconds.  Balloon reinflated a third time: Max pressure = 6 atilio. Total duration = 20 seconds.  Balloon reinflated a fourth time: Max pressure = 12 atilio. Total duration = 15 seconds.

## 2025-03-16 NOTE — PROGRESS NOTES
RECEIVING UNIT ED HANDOFF REVIEW    ED Nurse Handoff Report was reviewed by: Julián Marin RN on March 16, 2025 at 6:39 PM

## 2025-03-16 NOTE — Clinical Note
Max pressure = 12 atilio. Total duration = 18 seconds.     Max pressure = 14 atilio. Total duration = 18 seconds.    Balloon reinflated a second time: Max pressure = 14 atilio. Total duration = 18 seconds.  Balloon reinflated a third time: Max pressure = 14 atilio. Total duration = 18 seconds.

## 2025-03-16 NOTE — Clinical Note
The first balloon was inserted into the left anterior descending.Max pressure = 12 atilio. Total duration = 14 seconds.     Max pressure = 16 atilio. Total duration = 18 seconds.    Balloon reinflated a second time: Max pressure = 16 atilio. Total duration = 18 seconds.  Balloon reinflated a third time: Max pressure = 16 atilio. Total duration = 20 seconds.  Balloon reinflated a fourth time: Max pressure = 16 atilio. Total duration = 14 seconds.

## 2025-03-16 NOTE — ED TRIAGE NOTES
Pt was brought in by [facility] EMS from a tavern/bar this afternoon after pt had syncopal episode. Pt was eating burger with a mixed drink, about half way through his meal, the pt had syncopal. Started lisinopril last month. Pt reported to EMS that he was having neck to left arm pain yesterday along with slight CP. EMS gave 324 ASA and 1 NTG

## 2025-03-16 NOTE — PHARMACY-ADMISSION MEDICATION HISTORY
Pharmacist Admission Medication History    Admission medication history is complete. The information provided in this note is only as accurate as the sources available at the time of the update.    Information Source(s): Patient and CareEverywhere/SureScripts via     Pertinent Information:     Changes made to PTA medication list:  Added: None  Deleted: None  Changed: None    Allergies reviewed with patient and updates made in EHR: no    Medication History Completed By: Roxie Constantino RPH 3/16/2025 6:27 PM    PTA Med List   Medication Sig Last Dose/Taking    lisinopril (ZESTRIL) 10 MG tablet Take 10 mg by mouth daily. 3/16/2025     Roxie Constantino PharmD

## 2025-03-16 NOTE — ED NOTES
Bed: ED01  Expected date:   Expected time:   Means of arrival:   Comments:  540 64M syncope CP/ASA given

## 2025-03-16 NOTE — Clinical Note
Stent deployed in the left anterior descending. Max pressure = 12 atilio. Total duration = 18 seconds.

## 2025-03-17 DIAGNOSIS — I10 BENIGN ESSENTIAL HYPERTENSION: Primary | ICD-10-CM

## 2025-03-17 LAB
ACT BLD: 280 SECONDS (ref 74–150)
ACT BLD: 288 SECONDS (ref 74–150)
ACT BLD: 292 SECONDS (ref 74–150)
ALBUMIN SERPL BCG-MCNC: 4 G/DL (ref 3.5–5.2)
ALP SERPL-CCNC: 137 U/L (ref 40–150)
ALT SERPL W P-5'-P-CCNC: 47 U/L (ref 0–70)
ANION GAP SERPL CALCULATED.3IONS-SCNC: 10 MMOL/L (ref 7–15)
AST SERPL W P-5'-P-CCNC: 103 U/L (ref 0–45)
BILIRUB SERPL-MCNC: 0.9 MG/DL
BUN SERPL-MCNC: 16.5 MG/DL (ref 8–23)
CALCIUM SERPL-MCNC: 9 MG/DL (ref 8.8–10.4)
CHLORIDE SERPL-SCNC: 106 MMOL/L (ref 98–107)
CREAT SERPL-MCNC: 1.38 MG/DL (ref 0.67–1.17)
EGFRCR SERPLBLD CKD-EPI 2021: 57 ML/MIN/1.73M2
ERYTHROCYTE [DISTWIDTH] IN BLOOD BY AUTOMATED COUNT: 12.7 % (ref 10–15)
GLUCOSE SERPL-MCNC: 122 MG/DL (ref 70–99)
HCO3 SERPL-SCNC: 23 MMOL/L (ref 22–29)
HCT VFR BLD AUTO: 42.9 % (ref 40–53)
HGB BLD-MCNC: 15.1 G/DL (ref 13.3–17.7)
MCH RBC QN AUTO: 30.9 PG (ref 26.5–33)
MCHC RBC AUTO-ENTMCNC: 35.2 G/DL (ref 31.5–36.5)
MCV RBC AUTO: 88 FL (ref 78–100)
PLATELET # BLD AUTO: 236 10E3/UL (ref 150–450)
POTASSIUM SERPL-SCNC: 4 MMOL/L (ref 3.4–5.3)
PROT SERPL-MCNC: 7.1 G/DL (ref 6.4–8.3)
RBC # BLD AUTO: 4.89 10E6/UL (ref 4.4–5.9)
SODIUM SERPL-SCNC: 139 MMOL/L (ref 135–145)
TROPONIN T SERPL HS-MCNC: 337 NG/L
UFH PPP CHRO-ACNC: 0.28 IU/ML
WBC # BLD AUTO: 12.3 10E3/UL (ref 4–11)

## 2025-03-17 PROCEDURE — 250N000011 HC RX IP 250 OP 636: Performed by: INTERNAL MEDICINE

## 2025-03-17 PROCEDURE — 250N000011 HC RX IP 250 OP 636: Mod: JZ | Performed by: HOSPITALIST

## 2025-03-17 PROCEDURE — 250N000013 HC RX MED GY IP 250 OP 250 PS 637: Performed by: STUDENT IN AN ORGANIZED HEALTH CARE EDUCATION/TRAINING PROGRAM

## 2025-03-17 PROCEDURE — C1887 CATHETER, GUIDING: HCPCS | Performed by: INTERNAL MEDICINE

## 2025-03-17 PROCEDURE — C9600 PERC DRUG-EL COR STENT SING: HCPCS | Mod: LD | Performed by: INTERNAL MEDICINE

## 2025-03-17 PROCEDURE — C1769 GUIDE WIRE: HCPCS | Performed by: INTERNAL MEDICINE

## 2025-03-17 PROCEDURE — 250N000013 HC RX MED GY IP 250 OP 250 PS 637: Performed by: INTERNAL MEDICINE

## 2025-03-17 PROCEDURE — 92978 ENDOLUMINL IVUS OCT C 1ST: CPT | Performed by: INTERNAL MEDICINE

## 2025-03-17 PROCEDURE — 84520 ASSAY OF UREA NITROGEN: CPT | Performed by: STUDENT IN AN ORGANIZED HEALTH CARE EDUCATION/TRAINING PROGRAM

## 2025-03-17 PROCEDURE — 210N000002 HC R&B HEART CARE

## 2025-03-17 PROCEDURE — 93454 CORONARY ARTERY ANGIO S&I: CPT | Performed by: INTERNAL MEDICINE

## 2025-03-17 PROCEDURE — 272N000001 HC OR GENERAL SUPPLY STERILE: Performed by: INTERNAL MEDICINE

## 2025-03-17 PROCEDURE — 99152 MOD SED SAME PHYS/QHP 5/>YRS: CPT | Performed by: INTERNAL MEDICINE

## 2025-03-17 PROCEDURE — C1753 CATH, INTRAVAS ULTRASOUND: HCPCS | Performed by: INTERNAL MEDICINE

## 2025-03-17 PROCEDURE — 258N000003 HC RX IP 258 OP 636: Performed by: INTERNAL MEDICINE

## 2025-03-17 PROCEDURE — 85347 COAGULATION TIME ACTIVATED: CPT

## 2025-03-17 PROCEDURE — 99153 MOD SED SAME PHYS/QHP EA: CPT | Performed by: INTERNAL MEDICINE

## 2025-03-17 PROCEDURE — C1894 INTRO/SHEATH, NON-LASER: HCPCS | Performed by: INTERNAL MEDICINE

## 2025-03-17 PROCEDURE — 84484 ASSAY OF TROPONIN QUANT: CPT | Performed by: STUDENT IN AN ORGANIZED HEALTH CARE EDUCATION/TRAINING PROGRAM

## 2025-03-17 PROCEDURE — 250N000009 HC RX 250: Performed by: INTERNAL MEDICINE

## 2025-03-17 PROCEDURE — 93005 ELECTROCARDIOGRAM TRACING: CPT

## 2025-03-17 PROCEDURE — 85520 HEPARIN ASSAY: CPT | Performed by: STUDENT IN AN ORGANIZED HEALTH CARE EDUCATION/TRAINING PROGRAM

## 2025-03-17 PROCEDURE — 250N000013 HC RX MED GY IP 250 OP 250 PS 637: Performed by: HOSPITALIST

## 2025-03-17 PROCEDURE — 99232 SBSQ HOSP IP/OBS MODERATE 35: CPT | Performed by: STUDENT IN AN ORGANIZED HEALTH CARE EDUCATION/TRAINING PROGRAM

## 2025-03-17 PROCEDURE — 99152 MOD SED SAME PHYS/QHP 5/>YRS: CPT | Mod: GC | Performed by: INTERNAL MEDICINE

## 2025-03-17 PROCEDURE — 82947 ASSAY GLUCOSE BLOOD QUANT: CPT | Performed by: STUDENT IN AN ORGANIZED HEALTH CARE EDUCATION/TRAINING PROGRAM

## 2025-03-17 PROCEDURE — C1725 CATH, TRANSLUMIN NON-LASER: HCPCS | Performed by: INTERNAL MEDICINE

## 2025-03-17 PROCEDURE — 0914T PRQ TCAT THR RX NTRC BAL SEP: CPT | Mod: GC | Performed by: INTERNAL MEDICINE

## 2025-03-17 PROCEDURE — 93454 CORONARY ARTERY ANGIO S&I: CPT | Mod: 26 | Performed by: INTERNAL MEDICINE

## 2025-03-17 PROCEDURE — 250N000011 HC RX IP 250 OP 636: Performed by: STUDENT IN AN ORGANIZED HEALTH CARE EDUCATION/TRAINING PROGRAM

## 2025-03-17 PROCEDURE — 92928 PRQ TCAT PLMT NTRAC ST 1 LES: CPT | Mod: LD | Performed by: INTERNAL MEDICINE

## 2025-03-17 PROCEDURE — 99223 1ST HOSP IP/OBS HIGH 75: CPT | Mod: 25 | Performed by: INTERNAL MEDICINE

## 2025-03-17 PROCEDURE — 85014 HEMATOCRIT: CPT | Performed by: STUDENT IN AN ORGANIZED HEALTH CARE EDUCATION/TRAINING PROGRAM

## 2025-03-17 PROCEDURE — C1874 STENT, COATED/COV W/DEL SYS: HCPCS | Performed by: INTERNAL MEDICINE

## 2025-03-17 PROCEDURE — 93010 ELECTROCARDIOGRAM REPORT: CPT | Performed by: INTERNAL MEDICINE

## 2025-03-17 PROCEDURE — 92978 ENDOLUMINL IVUS OCT C 1ST: CPT | Mod: 26 | Performed by: INTERNAL MEDICINE

## 2025-03-17 PROCEDURE — 82435 ASSAY OF BLOOD CHLORIDE: CPT | Performed by: STUDENT IN AN ORGANIZED HEALTH CARE EDUCATION/TRAINING PROGRAM

## 2025-03-17 PROCEDURE — 36415 COLL VENOUS BLD VENIPUNCTURE: CPT | Performed by: STUDENT IN AN ORGANIZED HEALTH CARE EDUCATION/TRAINING PROGRAM

## 2025-03-17 PROCEDURE — 250N000011 HC RX IP 250 OP 636: Performed by: HOSPITALIST

## 2025-03-17 DEVICE — STENT CORONARY DES SYNERGY XD MR US 2.50X28MM H7493941828250: Type: IMPLANTABLE DEVICE | Status: FUNCTIONAL

## 2025-03-17 DEVICE — STENT CORONARY DES SYNERGY XD MR US 3.50X32MM H7493941832350: Type: IMPLANTABLE DEVICE | Status: FUNCTIONAL

## 2025-03-17 RX ORDER — NALOXONE HYDROCHLORIDE 0.4 MG/ML
0.4 INJECTION, SOLUTION INTRAMUSCULAR; INTRAVENOUS; SUBCUTANEOUS
Status: DISCONTINUED | OUTPATIENT
Start: 2025-03-17 | End: 2025-03-17

## 2025-03-17 RX ORDER — IOPAMIDOL 755 MG/ML
INJECTION, SOLUTION INTRAVASCULAR
Status: DISCONTINUED | OUTPATIENT
Start: 2025-03-17 | End: 2025-03-17 | Stop reason: HOSPADM

## 2025-03-17 RX ORDER — ONDANSETRON 2 MG/ML
4 INJECTION INTRAMUSCULAR; INTRAVENOUS EVERY 6 HOURS PRN
Status: DISCONTINUED | OUTPATIENT
Start: 2025-03-17 | End: 2025-03-18 | Stop reason: HOSPADM

## 2025-03-17 RX ORDER — ACETAMINOPHEN 325 MG/1
650 TABLET ORAL EVERY 4 HOURS PRN
Status: DISCONTINUED | OUTPATIENT
Start: 2025-03-17 | End: 2025-03-18 | Stop reason: HOSPADM

## 2025-03-17 RX ORDER — ATORVASTATIN CALCIUM 40 MG/1
40 TABLET, FILM COATED ORAL DAILY
Qty: 90 TABLET | Refills: 3 | Status: SHIPPED | OUTPATIENT
Start: 2025-03-17

## 2025-03-17 RX ORDER — ASPIRIN 325 MG
325 TABLET ORAL ONCE
Status: COMPLETED | OUTPATIENT
Start: 2025-03-17 | End: 2025-03-17

## 2025-03-17 RX ORDER — NALOXONE HYDROCHLORIDE 0.4 MG/ML
0.2 INJECTION, SOLUTION INTRAMUSCULAR; INTRAVENOUS; SUBCUTANEOUS
Status: DISCONTINUED | OUTPATIENT
Start: 2025-03-17 | End: 2025-03-18 | Stop reason: HOSPADM

## 2025-03-17 RX ORDER — NITROGLYCERIN 5 MG/ML
VIAL (ML) INTRAVENOUS
Status: DISCONTINUED | OUTPATIENT
Start: 2025-03-17 | End: 2025-03-17 | Stop reason: HOSPADM

## 2025-03-17 RX ORDER — ASPIRIN 81 MG/1
81 TABLET ORAL DAILY
Status: DISCONTINUED | OUTPATIENT
Start: 2025-03-18 | End: 2025-03-18 | Stop reason: HOSPADM

## 2025-03-17 RX ORDER — NALOXONE HYDROCHLORIDE 0.4 MG/ML
0.2 INJECTION, SOLUTION INTRAMUSCULAR; INTRAVENOUS; SUBCUTANEOUS
Status: DISCONTINUED | OUTPATIENT
Start: 2025-03-17 | End: 2025-03-17

## 2025-03-17 RX ORDER — POTASSIUM CHLORIDE 1500 MG/1
20 TABLET, EXTENDED RELEASE ORAL
Status: DISCONTINUED | OUTPATIENT
Start: 2025-03-17 | End: 2025-03-17 | Stop reason: HOSPADM

## 2025-03-17 RX ORDER — SODIUM CHLORIDE 9 MG/ML
INJECTION, SOLUTION INTRAVENOUS CONTINUOUS
Status: ACTIVE | OUTPATIENT
Start: 2025-03-17 | End: 2025-03-17

## 2025-03-17 RX ORDER — ATROPINE SULFATE 0.1 MG/ML
0.5 INJECTION INTRAVENOUS
Status: ACTIVE | OUTPATIENT
Start: 2025-03-17 | End: 2025-03-17

## 2025-03-17 RX ORDER — VERAPAMIL HYDROCHLORIDE 2.5 MG/ML
INJECTION, SOLUTION INTRAVENOUS
Status: DISCONTINUED | OUTPATIENT
Start: 2025-03-17 | End: 2025-03-17 | Stop reason: HOSPADM

## 2025-03-17 RX ORDER — OXYCODONE HYDROCHLORIDE 5 MG/1
10 TABLET ORAL EVERY 4 HOURS PRN
Status: DISCONTINUED | OUTPATIENT
Start: 2025-03-17 | End: 2025-03-18 | Stop reason: HOSPADM

## 2025-03-17 RX ORDER — NALOXONE HYDROCHLORIDE 0.4 MG/ML
0.4 INJECTION, SOLUTION INTRAMUSCULAR; INTRAVENOUS; SUBCUTANEOUS
Status: DISCONTINUED | OUTPATIENT
Start: 2025-03-17 | End: 2025-03-18 | Stop reason: HOSPADM

## 2025-03-17 RX ORDER — LORAZEPAM 0.5 MG/1
0.5 TABLET ORAL
Status: DISCONTINUED | OUTPATIENT
Start: 2025-03-17 | End: 2025-03-17 | Stop reason: HOSPADM

## 2025-03-17 RX ORDER — FENTANYL CITRATE 50 UG/ML
25 INJECTION, SOLUTION INTRAMUSCULAR; INTRAVENOUS
Status: DISCONTINUED | OUTPATIENT
Start: 2025-03-17 | End: 2025-03-18 | Stop reason: HOSPADM

## 2025-03-17 RX ORDER — NITROGLYCERIN 0.4 MG/1
0.4 TABLET SUBLINGUAL EVERY 5 MIN PRN
Status: DISCONTINUED | OUTPATIENT
Start: 2025-03-17 | End: 2025-03-18 | Stop reason: HOSPADM

## 2025-03-17 RX ORDER — LIDOCAINE 40 MG/G
CREAM TOPICAL
Status: DISCONTINUED | OUTPATIENT
Start: 2025-03-17 | End: 2025-03-17

## 2025-03-17 RX ORDER — LISINOPRIL 10 MG/1
10 TABLET ORAL DAILY
Qty: 30 TABLET | Refills: 1 | Status: SHIPPED | OUTPATIENT
Start: 2025-03-17

## 2025-03-17 RX ORDER — ASPIRIN 81 MG/1
81 TABLET, CHEWABLE ORAL ONCE
Status: DISCONTINUED | OUTPATIENT
Start: 2025-03-17 | End: 2025-03-17

## 2025-03-17 RX ORDER — HEPARIN SODIUM 1000 [USP'U]/ML
INJECTION, SOLUTION INTRAVENOUS; SUBCUTANEOUS
Status: DISCONTINUED | OUTPATIENT
Start: 2025-03-17 | End: 2025-03-17 | Stop reason: HOSPADM

## 2025-03-17 RX ORDER — ONDANSETRON 4 MG/1
4 TABLET, ORALLY DISINTEGRATING ORAL EVERY 6 HOURS PRN
Status: DISCONTINUED | OUTPATIENT
Start: 2025-03-17 | End: 2025-03-18 | Stop reason: HOSPADM

## 2025-03-17 RX ORDER — FLUMAZENIL 0.1 MG/ML
0.2 INJECTION, SOLUTION INTRAVENOUS
Status: ACTIVE | OUTPATIENT
Start: 2025-03-17 | End: 2025-03-17

## 2025-03-17 RX ORDER — OXYCODONE HYDROCHLORIDE 5 MG/1
5 TABLET ORAL EVERY 4 HOURS PRN
Status: DISCONTINUED | OUTPATIENT
Start: 2025-03-17 | End: 2025-03-18 | Stop reason: HOSPADM

## 2025-03-17 RX ORDER — LORAZEPAM 2 MG/ML
0.5 INJECTION INTRAMUSCULAR
Status: DISCONTINUED | OUTPATIENT
Start: 2025-03-17 | End: 2025-03-17 | Stop reason: HOSPADM

## 2025-03-17 RX ORDER — SODIUM CHLORIDE 9 MG/ML
INJECTION, SOLUTION INTRAVENOUS CONTINUOUS
Status: DISCONTINUED | OUTPATIENT
Start: 2025-03-17 | End: 2025-03-17 | Stop reason: HOSPADM

## 2025-03-17 RX ORDER — HYDROMORPHONE HCL IN WATER/PF 6 MG/30 ML
0.2 PATIENT CONTROLLED ANALGESIA SYRINGE INTRAVENOUS
Status: DISCONTINUED | OUTPATIENT
Start: 2025-03-17 | End: 2025-03-18 | Stop reason: HOSPADM

## 2025-03-17 RX ORDER — FENTANYL CITRATE 50 UG/ML
INJECTION, SOLUTION INTRAMUSCULAR; INTRAVENOUS
Status: DISCONTINUED | OUTPATIENT
Start: 2025-03-17 | End: 2025-03-17 | Stop reason: HOSPADM

## 2025-03-17 RX ORDER — ASPIRIN 81 MG/1
243 TABLET, CHEWABLE ORAL ONCE
Status: COMPLETED | OUTPATIENT
Start: 2025-03-17 | End: 2025-03-17

## 2025-03-17 RX ORDER — METOPROLOL TARTRATE 1 MG/ML
5 INJECTION, SOLUTION INTRAVENOUS
Status: DISCONTINUED | OUTPATIENT
Start: 2025-03-17 | End: 2025-03-18 | Stop reason: HOSPADM

## 2025-03-17 RX ORDER — ASPIRIN 81 MG/1
81 TABLET, CHEWABLE ORAL DAILY
Qty: 30 TABLET | Refills: 3 | Status: SHIPPED | OUTPATIENT
Start: 2025-03-17

## 2025-03-17 RX ORDER — HYDRALAZINE HYDROCHLORIDE 20 MG/ML
10 INJECTION INTRAMUSCULAR; INTRAVENOUS EVERY 4 HOURS PRN
Status: DISCONTINUED | OUTPATIENT
Start: 2025-03-17 | End: 2025-03-18 | Stop reason: HOSPADM

## 2025-03-17 RX ADMIN — SODIUM CHLORIDE: 0.9 INJECTION, SOLUTION INTRAVENOUS at 10:16

## 2025-03-17 RX ADMIN — ASPIRIN 81 MG: 81 TABLET, CHEWABLE ORAL at 08:34

## 2025-03-17 RX ADMIN — FAMOTIDINE 20 MG: 20 TABLET, FILM COATED ORAL at 08:34

## 2025-03-17 RX ADMIN — ASPIRIN 243 MG: 81 TABLET, CHEWABLE ORAL at 10:11

## 2025-03-17 RX ADMIN — ATORVASTATIN CALCIUM 40 MG: 40 TABLET, FILM COATED ORAL at 20:46

## 2025-03-17 RX ADMIN — LISINOPRIL 10 MG: 10 TABLET ORAL at 08:34

## 2025-03-17 RX ADMIN — HYDRALAZINE HYDROCHLORIDE 10 MG: 20 INJECTION INTRAMUSCULAR; INTRAVENOUS at 16:43

## 2025-03-17 RX ADMIN — TICAGRELOR 90 MG: 90 TABLET ORAL at 22:03

## 2025-03-17 RX ADMIN — METOPROLOL TARTRATE 12.5 MG: 25 TABLET, FILM COATED ORAL at 20:45

## 2025-03-17 RX ADMIN — METOPROLOL TARTRATE 12.5 MG: 25 TABLET, FILM COATED ORAL at 08:34

## 2025-03-17 RX ADMIN — FAMOTIDINE 20 MG: 20 TABLET, FILM COATED ORAL at 20:46

## 2025-03-17 RX ADMIN — HYDROMORPHONE HYDROCHLORIDE 0.2 MG: 0.2 INJECTION, SOLUTION INTRAMUSCULAR; INTRAVENOUS; SUBCUTANEOUS at 01:15

## 2025-03-17 RX ADMIN — NITROGLYCERIN 0.4 MG: 0.4 TABLET SUBLINGUAL at 00:10

## 2025-03-17 RX ADMIN — HEPARIN SODIUM 1150 UNITS/HR: 10000 INJECTION, SOLUTION INTRAVENOUS at 08:49

## 2025-03-17 ASSESSMENT — ACTIVITIES OF DAILY LIVING (ADL)
ADLS_ACUITY_SCORE: 20
ADLS_ACUITY_SCORE: 18
ADLS_ACUITY_SCORE: 20
ADLS_ACUITY_SCORE: 18
ADLS_ACUITY_SCORE: 18
ADLS_ACUITY_SCORE: 20
ADLS_ACUITY_SCORE: 18
ADLS_ACUITY_SCORE: 19
ADLS_ACUITY_SCORE: 20
ADLS_ACUITY_SCORE: 18
ADLS_ACUITY_SCORE: 20
ADLS_ACUITY_SCORE: 18
ADLS_ACUITY_SCORE: 20
ADLS_ACUITY_SCORE: 18
ADLS_ACUITY_SCORE: 20
ADLS_ACUITY_SCORE: 18

## 2025-03-17 NOTE — PROGRESS NOTES
"[facility]    Medicine Progress Note - Hospitalist Service    Date of Admission:  3/16/2025    Assessment & Plan      NSTEMI  Chest Pain  Brief Syncope  HTN  Presented with one day of left arm pain that transitioned into left upper chest, neck, arm and jaw pain associated with diaphoresis and lightheadedness. Had a few seconds of LOC. Did not fall.  In the ED, he was hypertensive and slightly tachycardic. Labs were remarkable for Cr of 1.42 and Trop of 288. Was given 324mg Asa and 5mg NTG with EMS. Started on Heparin ggt in the ED.        - Trop: 288 > 245      - 3/3/25 Lipid Panel:                - Cholesterol 172, Triglyceride: 158 , LDL: 108      - TTE ordered      - Continue Heparin ggt  - Continue home Lisinopril   - Start Asa 81mg daily   - Start Metoprolol 12.5mg BID   - Start Atorvastatin 40mg daily      - Cardiology consulted and angiogram today      Likely CKD  - Cr: 1.42                - Baseline: 1.30-1.40   - Continue to monitor            Diet: NPO for Procedure/Surgery per Anesthesia Guidelines Except for: Meds; Clear liquids before procedure/surgery: ADULT (Age GREATER than or Equal to 18 years) - Clear liquids 2 hours before procedure/surgery  NPO for Procedure/Surgery per Anesthesia Guidelines Except for: Meds; Clear liquids before procedure/surgery: ADULT (Age GREATER than or Equal to 18 years) - Clear liquids 2 hours before procedure/surgery   Gifford Catheter: Not present  Lines: PRESENT             Cardiac Monitoring: ACTIVE order. Indication: Post- PCI/Angiogram (24 hours)  Code Status: Full Code      Clinically Significant Risk Factors Present on Admission                   # Hypertension: Home medication list includes antihypertensive(s)           # Obesity: Estimated body mass index is 31.61 kg/m  as calculated from the following:    Height as of this encounter: 1.727 m (5' 8\").    Weight as of this encounter: 94.3 kg (207 lb 14.4 oz).              Social Drivers of " Health    Tobacco Use: Medium Risk (3/3/2025)    Received from [employer]    Patient History     Smoking Tobacco Use: Former     Smokeless Tobacco Use: Unknown     Passive Exposure: Past          Disposition Plan     Medically Ready for Discharge: Anticipated Tomorrow             Leon Andrews MD  Hospitalist Service  [facility]  Securely message with [employer] (more info)  Text page via [employer] Paging/Directory   ______________________________________________________________________    Interval History   Chest pain and right upper extremity pain    No shortness of breath       Physical Exam   Vital Signs: Temp: 97.3  F (36.3  C) Temp src: Oral BP: (!) 164/111 Pulse: 91   Resp: 16 SpO2: 98 % O2 Device: Nasal cannula    Weight: 207 lbs 14.4 oz    Physical Exam  Cardiovascular:      Rate and Rhythm: Normal rate and regular rhythm.   Pulmonary:      Effort: No respiratory distress.      Breath sounds: Normal breath sounds. No wheezing.   Abdominal:      General: There is no distension.      Palpations: Abdomen is soft.      Tenderness: There is no abdominal tenderness.          Medical Decision Making       38 MINUTES SPENT BY ME on the date of service doing chart review, history, exam, documentation & further activities per the note.      Data     I have personally reviewed the following data over the past 24 hrs:    12.3 (H)  \   15.1   / 236     139 106 16.5 /  122 (H)   4.0 23 1.38 (H) \     ALT: 47 AST: 103 (H) AP: 137 TBILI: 0.9   ALB: 4.0 TOT PROTEIN: 7.1 LIPASE: N/A     Trop: 337 (HH) BNP: 372     INR:  N/A PTT:  N/A   D-dimer:  0.45 Fibrinogen:  N/A       Imaging results reviewed over the past 24 hrs:   Recent Results (from the past 24 hours)   Chest XR,  PA & LAT    Narrative    EXAM: XR CHEST 2 VIEWS  LOCATION: [facility]  DATE: 3/16/2025    INDICATION: Syncope, chest pain  COMPARISON: None.      Impression    IMPRESSION: Negative chest.

## 2025-03-17 NOTE — PLAN OF CARE
Goal Outcome Evaluation:      Plan of Care Reviewed With: patient  Pt alert, steady with ambulation. Denies CP or SOB. Recd 2 stents to LAD, Right radial site ecchymotic, TR removed. BP elevated, prn hydralazine. Seen by Zheng, aware of BP and post angio EKG, states it is J point elevation. Voiding/ taking fluids well.

## 2025-03-17 NOTE — PROVIDER NOTIFICATION
MD Notification    Notified Person: MD    Notified Person Name: Zacarias    Notification Date/Time: 3/17 0030    Notification Interaction: amcom    Purpose of Notification: 253,M.G - pt having L arm pain on/off since admit. Pt complain 7/10 arm px. EKG done, NTG given. BP drop from 155/97-77/49. Now 109/71. Pain slightly improved but still present.  Any other interventions?    Orders Received: Do not give anymore nitroglycerin, dilaudid 0.2 mg iv added PRN for patient arm pain.      Comments:

## 2025-03-17 NOTE — PROGRESS NOTES
Orientation: A/Ox4    Vitals/Tele: VSS ex HTN, SR    IV Access/drains: infusing heparin 1150, 2nd check in range, recheck 3/18 AM    Diet: NPO    Mobility: independent    GI/: continent    Wound/Skin: WNL    Pain: L limb pain, dilaudid given    Consults: ECHO and cardiology consult    Discharge Plan: pending    Other: Do not give nitroglycerin, Patient BP dropped 155 to 77/49.  Nitroglycerin 1x had been given d/t L limb/upper shoulder pain.      See Flow sheets for assessment

## 2025-03-17 NOTE — PRE-PROCEDURE
GENERAL PRE-PROCEDURE:     Verbal consent obtained?: Yes    Risks and benefits: Risks, benefits and alternatives were discussed    Consent given by:  Patient  Patient states understanding of procedure being performed: Yes    Patient's understanding of procedure matches consent: Yes    Procedure consent matches procedure scheduled: Yes    Expected level of sedation:  Moderate  Appropriately NPO:  Yes  ASA Class:  2  Mallampati  :  Grade 2- soft palate, base of uvula, tonsillar pillars, and portion of posterior pharyngeal wall visible  Lungs:  Lungs clear with good breath sounds bilaterally  Heart:  Normal heart sounds and rate  History & Physical reviewed:  History and physical reviewed and no updates needed  Statement of review:  I have reviewed the lab findings, diagnostic data, medications, and the plan for sedation     29-Sep-2023 16:03

## 2025-03-17 NOTE — CONSULTS
[facility]    Cardiology Consultation     Date of Admission:  3/16/2025    Assessment & Plan   Fredy Chow is a 64 year old male who was admitted on 3/16/2025.    1.  Unstable angina  2.  Essential hypertension  3.  CKD-creatinine 1.38  4.  Near syncopal episode-?  Vasovagal    Recommendations:   1.  Perform coronary angiogram.  Risks and benefits were discussed with the patient.  I answered all his questions and obtained written consent.  He is a good candidate for dual antiplatelet therapy in case need arises.  2.  Check fasting lipid profile.  Agree with keeping him on Lipitor.   3.  Continue other cardiac medications as well as heparin.  4.  Perform echocardiogram    High complexity     Ciaran Calzada MD, MD    Primary Care Physician   Caitlyn Clements    Reason for Consult   Reason for consult: I was asked to evaluate this patient for unstable angina     History of Present Illness   Fredy Chow is a 64 year old male with past medical history of hypertension and CKD presented to the hospital for complaints of chest and arm pain.  The pain started on Saturday and has been going on since then.  It is relapsing and remitting in nature without any particular triggers.  The pain is left-sided, infra claviculare and radiates mostly to the left arm.  The patient was unable to sleep Saturday night due to pain.  On Sunday the pain became little better and he was able to rest.  Later in the day he went to the restaurant for lunch and had a near syncopal episode there.  He was going to use the restroom but all of a sudden became really cold and clammy and started profusely sweating.  The next thing he knows is that he had waiters around him and they were calling 911.  He is not sure if he actually lost consciousness.  He did not sustain any injuries.  He was then brought to the emergency department where an EKG was performed which showed Q waves in inferior leads and J-point  elevation in the precordial leads.  No ST elevation otherwise was noted.  Troponins were checked and were significantly elevated and have been uptrending.  The last 1 is 337.  He was started on heparin drip, aspirin, Lipitor.    Past Medical History   Past Medical History:   Diagnosis Date    Anal condyloma     diagnosed at approx age 26    Genital herpes     Schwannoma     diagnosed at age 32, excision performed         Past Surgical History   Past Surgical History:   Procedure Laterality Date    BACK SURGERY  04/23/2002         Prior to Admission Medications   Prior to Admission Medications   Prescriptions Last Dose Informant Patient Reported? Taking?   lisinopril (ZESTRIL) 10 MG tablet 3/16/2025 Self Yes Yes   Sig: Take 10 mg by mouth daily.      Facility-Administered Medications: None     Current Facility-Administered Medications   Medication Dose Route Frequency Provider Last Rate Last Admin    acetaminophen (TYLENOL) tablet 650 mg  650 mg Oral Q4H PRN Manju Puga MD   650 mg at 03/16/25 2201    Or    acetaminophen (TYLENOL) Suppository 650 mg  650 mg Rectal Q4H PRN Manju Puga MD        aspirin (ASA) chewable tablet 81 mg  81 mg Oral Daily Manju Puga MD   81 mg at 03/17/25 0834    atorvastatin (LIPITOR) tablet 40 mg  40 mg Oral QPM Manju Puga MD   40 mg at 03/16/25 2156    calcium carbonate (TUMS) chewable tablet 1,000 mg  1,000 mg Oral 4x Daily PRN Manju Puga MD        famotidine (PEPCID) tablet 20 mg  20 mg Oral BID Manju Puga MD   20 mg at 03/17/25 0834    heparin 25,000 units in 0.45% NaCl 250 mL ANTICOAGULANT infusion  0-5,000 Units/hr Intravenous Continuous Manju Puga MD 11.5 mL/hr at 03/17/25 0849 1,150 Units/hr at 03/17/25 0849    hydrALAZINE (APRESOLINE) injection 10 mg  10 mg Intravenous Q6H PRN Manju Puga MD        HYDROmorphone (DILAUDID) injection 0.2 mg  0.2 mg Intravenous Q2H PRN Nicolle Adames MD    0.2 mg at 03/17/25 0115    lidocaine (LMX4) cream   Topical Q1H PRN Manju Puga MD        lidocaine 1 % 0.1-1 mL  0.1-1 mL Other Q1H PRN Manju Puga MD        lisinopril (ZESTRIL) tablet 10 mg  10 mg Oral Daily Manju Puga MD   10 mg at 03/17/25 0834    metoprolol tartrate (LOPRESSOR) half-tab 12.5 mg  12.5 mg Oral BID Manju Puga MD   12.5 mg at 03/17/25 0834    naloxone (NARCAN) injection 0.2 mg  0.2 mg Intravenous Q2 Min PRN Hoiness, Darlene, RPH        Or    naloxone (NARCAN) injection 0.4 mg  0.4 mg Intravenous Q2 Min PRN Hoiness, Darlene, RPH        Or    naloxone (NARCAN) injection 0.2 mg  0.2 mg Intramuscular Q2 Min PRN Hoiness, Darlene, RPH        Or    naloxone (NARCAN) injection 0.4 mg  0.4 mg Intramuscular Q2 Min PRN Hoiness, Darlene, RPH        nitroGLYcerin (NITROSTAT) sublingual tablet 0.4 mg  0.4 mg Sublingual Q5 Min PRN Manju Puga MD   0.4 mg at 03/17/25 0010    ondansetron (ZOFRAN ODT) ODT tab 4 mg  4 mg Oral Q6H PRN Manju Puga MD        Or    ondansetron (ZOFRAN) injection 4 mg  4 mg Intravenous Q6H PRN Manju Puga MD        Patient is already receiving anticoagulation with heparin, enoxaparin (LOVENOX), warfarin (COUMADIN)  or other anticoagulant medication   Does not apply Continuous PRN Manju Puga MD        prochlorperazine (COMPAZINE) injection 10 mg  10 mg Intravenous Q6H PRN Manju Puga MD        Or    prochlorperazine (COMPAZINE) tablet 10 mg  10 mg Oral Q6H PRN Manju Puga MD        senna-docusate (SENOKOT-S/PERICOLACE) 8.6-50 MG per tablet 1 tablet  1 tablet Oral BID PRN Manju Puga MD        Or    senna-docusate (SENOKOT-S/PERICOLACE) 8.6-50 MG per tablet 2 tablet  2 tablet Oral BID PRN Manju Puga MD        sodium chloride (PF) 0.9% PF flush 3 mL  3 mL Intracatheter Q8H Manju Puga MD        sodium chloride (PF) 0.9% PF flush 3 mL  3 mL  Intracatheter q1 min prn Manju Puga MD         Current Facility-Administered Medications   Medication Dose Route Frequency Provider Last Rate Last Admin    acetaminophen (TYLENOL) tablet 650 mg  650 mg Oral Q4H PRN Manju Puga MD   650 mg at 03/16/25 2201    Or    acetaminophen (TYLENOL) Suppository 650 mg  650 mg Rectal Q4H PRN Manju Puga MD        aspirin (ASA) chewable tablet 81 mg  81 mg Oral Daily Manju Puga MD   81 mg at 03/17/25 0834    atorvastatin (LIPITOR) tablet 40 mg  40 mg Oral QPM Manju Puga MD   40 mg at 03/16/25 2156    calcium carbonate (TUMS) chewable tablet 1,000 mg  1,000 mg Oral 4x Daily PRN Manju Puga MD        famotidine (PEPCID) tablet 20 mg  20 mg Oral BID Manju Puga MD   20 mg at 03/17/25 0834    heparin 25,000 units in 0.45% NaCl 250 mL ANTICOAGULANT infusion  0-5,000 Units/hr Intravenous Continuous Manju Puga MD 11.5 mL/hr at 03/17/25 0849 1,150 Units/hr at 03/17/25 0849    hydrALAZINE (APRESOLINE) injection 10 mg  10 mg Intravenous Q6H PRN Manju Puga MD        HYDROmorphone (DILAUDID) injection 0.2 mg  0.2 mg Intravenous Q2H PRN Nicolle Adames MD   0.2 mg at 03/17/25 0115    lidocaine (LMX4) cream   Topical Q1H PRN Manju Puga MD        lidocaine 1 % 0.1-1 mL  0.1-1 mL Other Q1H PRN Manju Puga MD        lisinopril (ZESTRIL) tablet 10 mg  10 mg Oral Daily Manju Pgua MD   10 mg at 03/17/25 0834    metoprolol tartrate (LOPRESSOR) half-tab 12.5 mg  12.5 mg Oral BID Manju Puga MD   12.5 mg at 03/17/25 0834    naloxone (NARCAN) injection 0.2 mg  0.2 mg Intravenous Q2 Min PRN Hoiness, Darlene, RPH        Or    naloxone (NARCAN) injection 0.4 mg  0.4 mg Intravenous Q2 Min PRN Hoiness, Darlene, RPH        Or    naloxone (NARCAN) injection 0.2 mg  0.2 mg Intramuscular Q2 Min PRN Hoiness, Darlene, RPH        Or    naloxone (NARCAN) injection  0.4 mg  0.4 mg Intramuscular Q2 Min PRN Darlene Calero, RPH        nitroGLYcerin (NITROSTAT) sublingual tablet 0.4 mg  0.4 mg Sublingual Q5 Min PRN Manju Puga MD   0.4 mg at 03/17/25 0010    ondansetron (ZOFRAN ODT) ODT tab 4 mg  4 mg Oral Q6H PRN Manju Puga MD        Or    ondansetron (ZOFRAN) injection 4 mg  4 mg Intravenous Q6H PRN Manju Puga MD        Patient is already receiving anticoagulation with heparin, enoxaparin (LOVENOX), warfarin (COUMADIN)  or other anticoagulant medication   Does not apply Continuous PRN Manju Puga MD        prochlorperazine (COMPAZINE) injection 10 mg  10 mg Intravenous Q6H PRN Manju Puga MD        Or    prochlorperazine (COMPAZINE) tablet 10 mg  10 mg Oral Q6H PRN Manju Puga MD        senna-docusate (SENOKOT-S/PERICOLACE) 8.6-50 MG per tablet 1 tablet  1 tablet Oral BID PRN Manju Puga MD        Or    senna-docusate (SENOKOT-S/PERICOLACE) 8.6-50 MG per tablet 2 tablet  2 tablet Oral BID PRN Manju Puga MD        sodium chloride (PF) 0.9% PF flush 3 mL  3 mL Intracatheter Q8H Manju Puga MD        sodium chloride (PF) 0.9% PF flush 3 mL  3 mL Intracatheter q1 min prn Manju Puga MD         Allergies   No Known Allergies    Social History    reports that he quit smoking about 38 years ago. His smoking use included cigarettes. He does not have any smokeless tobacco history on file. He reports current alcohol use. He reports that he does not use drugs.      Family History   I have reviewed this patient's family history and updated it with pertinent information if needed.  Family History   Problem Relation Age of Onset    Hypertension Mother     Heart Failure Father     Hypertension Father     Ovarian Cancer Sister     Cerebrovascular Disease Maternal Grandmother     Diabetes Paternal Grandfather     Colon Cancer No family hx of           Review of Systems   A comprehensive  "review of system was performed and is negative other than that noted in the HPI or here.     Physical Exam   Vital Signs with Ranges  Temp:  [97.3  F (36.3  C)-98.3  F (36.8  C)] 97.3  F (36.3  C)  Pulse:  [] 97  Resp:  [6-21] 16  BP: ()/() 148/93  SpO2:  [95 %-98 %] 95 %  Wt Readings from Last 4 Encounters:   03/17/25 94.3 kg (207 lb 14.4 oz)   01/31/25 98.9 kg (218 lb)   06/06/17 90.3 kg (199 lb)     I/O last 3 completed shifts:  In: -   Out: 425 [Urine:425]      Vitals: BP (!) 148/93 (BP Location: Left arm)   Pulse 97   Temp 97.3  F (36.3  C) (Oral)   Resp 16   Ht 1.727 m (5' 8\")   Wt 94.3 kg (207 lb 14.4 oz)   SpO2 95%   BMI 31.61 kg/m      Physical Exam:   General - Alert and oriented to time place and person in no acute distress  Eyes - No scleral icterus  HEENT - Neck supple, moist mucous membranes  Cardiovascular -4.  Near syncopal episode  Extremities - There is no edema  Respiratory - CTAB  Skin - No pallor or cyanosis  Gastrointestinal - Non tender and non distended without rebound or guarding  Psych - Appropriate affect   Neurological - No gross motor neurological focal deficits    No lab results found in last 7 days.    Invalid input(s): \"TROPONINIES\"    Recent Labs   Lab 03/17/25  0604 03/16/25  1607   WBC 12.3* 12.9*   HGB 15.1 16.1   MCV 88 88    239    139   POTASSIUM 4.0 3.8   CHLORIDE 106 104   CO2 23 23   BUN 16.5 14.2   CR 1.38* 1.42*   GFRESTIMATED 57* 56*   ANIONGAP 10 12   PATRIA 9.0 8.9   * 152*   ALBUMIN 4.0 4.1   PROTTOTAL 7.1 7.3   BILITOTAL 0.9 0.8   ALKPHOS 137 149   ALT 47 43   * 55*     No results for input(s): \"CHOL\", \"HDL\", \"LDL\", \"TRIG\", \"CHOLHDLRATIO\" in the last 21422 hours.  Recent Labs   Lab 03/17/25  0604 03/16/25  1607   WBC 12.3* 12.9*   HGB 15.1 16.1   HCT 42.9 44.3   MCV 88 88    239     No results for input(s): \"PH\", \"PHV\", \"PO2\", \"PO2V\", \"SAT\", \"PCO2\", \"PCO2V\", \"HCO3\", \"HCO3V\" in the last 168 hours.  Recent Labs " "  Lab 03/16/25  1607   NTBNP 372     Recent Labs   Lab 03/16/25  1607   DD 0.45     No results for input(s): \"SED\", \"CRP\" in the last 168 hours.  Recent Labs   Lab 03/17/25  0604 03/16/25  1607    239     No results for input(s): \"TSH\" in the last 168 hours.  No results for input(s): \"COLOR\", \"APPEARANCE\", \"URINEGLC\", \"URINEBILI\", \"URINEKETONE\", \"SG\", \"UBLD\", \"URINEPH\", \"PROTEIN\", \"UROBILINOGEN\", \"NITRITE\", \"LEUKEST\", \"RBCU\", \"WBCU\" in the last 168 hours.    Imaging:  Recent Results (from the past 48 hours)   Chest XR,  PA & LAT    Narrative    EXAM: XR CHEST 2 VIEWS  LOCATION: [facility]  DATE: 3/16/2025    INDICATION: Syncope, chest pain  COMPARISON: None.      Impression    IMPRESSION: Negative chest.       Echo:  No results found for this or any previous visit (from the past 4320 hours).    Clinically Significant Risk Factors Present on Admission                   # Hypertension: Home medication list includes antihypertensive(s)           # Obesity: Estimated body mass index is 31.61 kg/m  as calculated from the following:    Height as of this encounter: 1.727 m (5' 8\").    Weight as of this encounter: 94.3 kg (207 lb 14.4 oz).             CKD POA List: Stage 3 (unspecified if a or b) (GFR 30 - 59)                                "

## 2025-03-18 VITALS
SYSTOLIC BLOOD PRESSURE: 131 MMHG | HEART RATE: 91 BPM | HEIGHT: 68 IN | BODY MASS INDEX: 31.25 KG/M2 | DIASTOLIC BLOOD PRESSURE: 87 MMHG | TEMPERATURE: 98.7 F | WEIGHT: 206.2 LBS | OXYGEN SATURATION: 96 % | RESPIRATION RATE: 18 BRPM

## 2025-03-18 LAB
ANION GAP SERPL CALCULATED.3IONS-SCNC: 12 MMOL/L (ref 7–15)
ATRIAL RATE - MUSE: 102 BPM
ATRIAL RATE - MUSE: 89 BPM
BUN SERPL-MCNC: 14.5 MG/DL (ref 8–23)
CALCIUM SERPL-MCNC: 9.1 MG/DL (ref 8.8–10.4)
CHLORIDE SERPL-SCNC: 103 MMOL/L (ref 98–107)
CREAT SERPL-MCNC: 1.29 MG/DL (ref 0.67–1.17)
DIASTOLIC BLOOD PRESSURE - MUSE: NORMAL MMHG
DIASTOLIC BLOOD PRESSURE - MUSE: NORMAL MMHG
EGFRCR SERPLBLD CKD-EPI 2021: 62 ML/MIN/1.73M2
GLUCOSE SERPL-MCNC: 114 MG/DL (ref 70–99)
HCO3 SERPL-SCNC: 23 MMOL/L (ref 22–29)
INTERPRETATION ECG - MUSE: NORMAL
INTERPRETATION ECG - MUSE: NORMAL
LVEF ECHO: NORMAL
P AXIS - MUSE: 47 DEGREES
P AXIS - MUSE: 62 DEGREES
POTASSIUM SERPL-SCNC: 3.4 MMOL/L (ref 3.4–5.3)
PR INTERVAL - MUSE: 132 MS
PR INTERVAL - MUSE: 136 MS
QRS DURATION - MUSE: 76 MS
QRS DURATION - MUSE: 80 MS
QT - MUSE: 352 MS
QT - MUSE: 358 MS
QTC - MUSE: 435 MS
QTC - MUSE: 458 MS
R AXIS - MUSE: 12 DEGREES
R AXIS - MUSE: 57 DEGREES
SODIUM SERPL-SCNC: 138 MMOL/L (ref 135–145)
SYSTOLIC BLOOD PRESSURE - MUSE: NORMAL MMHG
SYSTOLIC BLOOD PRESSURE - MUSE: NORMAL MMHG
T AXIS - MUSE: 78 DEGREES
T AXIS - MUSE: 79 DEGREES
VENTRICULAR RATE- MUSE: 102 BPM
VENTRICULAR RATE- MUSE: 89 BPM

## 2025-03-18 PROCEDURE — 027035Z DILATION OF CORONARY ARTERY, ONE ARTERY WITH TWO DRUG-ELUTING INTRALUMINAL DEVICES, PERCUTANEOUS APPROACH: ICD-10-PCS | Performed by: INTERNAL MEDICINE

## 2025-03-18 PROCEDURE — B2111ZZ FLUOROSCOPY OF MULTIPLE CORONARY ARTERIES USING LOW OSMOLAR CONTRAST: ICD-10-PCS | Performed by: INTERNAL MEDICINE

## 2025-03-18 PROCEDURE — 97530 THERAPEUTIC ACTIVITIES: CPT | Mod: GP

## 2025-03-18 PROCEDURE — 99239 HOSP IP/OBS DSCHRG MGMT >30: CPT | Performed by: STUDENT IN AN ORGANIZED HEALTH CARE EDUCATION/TRAINING PROGRAM

## 2025-03-18 PROCEDURE — 99232 SBSQ HOSP IP/OBS MODERATE 35: CPT | Mod: 25 | Performed by: PHYSICIAN ASSISTANT

## 2025-03-18 PROCEDURE — 250N000013 HC RX MED GY IP 250 OP 250 PS 637: Performed by: HOSPITALIST

## 2025-03-18 PROCEDURE — 93005 ELECTROCARDIOGRAM TRACING: CPT

## 2025-03-18 PROCEDURE — 255N000002 HC RX 255 OP 636: Performed by: STUDENT IN AN ORGANIZED HEALTH CARE EDUCATION/TRAINING PROGRAM

## 2025-03-18 PROCEDURE — 4A023N7 MEASUREMENT OF CARDIAC SAMPLING AND PRESSURE, LEFT HEART, PERCUTANEOUS APPROACH: ICD-10-PCS | Performed by: INTERNAL MEDICINE

## 2025-03-18 PROCEDURE — 97161 PT EVAL LOW COMPLEX 20 MIN: CPT | Mod: GP

## 2025-03-18 PROCEDURE — 97110 THERAPEUTIC EXERCISES: CPT | Mod: GP

## 2025-03-18 PROCEDURE — 80048 BASIC METABOLIC PNL TOTAL CA: CPT | Performed by: HOSPITALIST

## 2025-03-18 PROCEDURE — 250N000013 HC RX MED GY IP 250 OP 250 PS 637: Performed by: STUDENT IN AN ORGANIZED HEALTH CARE EDUCATION/TRAINING PROGRAM

## 2025-03-18 PROCEDURE — 36415 COLL VENOUS BLD VENIPUNCTURE: CPT | Performed by: HOSPITALIST

## 2025-03-18 RX ORDER — METOPROLOL SUCCINATE 25 MG/1
25 TABLET, EXTENDED RELEASE ORAL EVERY EVENING
DISCHARGE
Start: 2025-03-18 | End: 2025-03-18

## 2025-03-18 RX ORDER — NITROGLYCERIN 0.4 MG/1
TABLET SUBLINGUAL
Qty: 30 TABLET | Refills: 0 | Status: SHIPPED | OUTPATIENT
Start: 2025-03-18

## 2025-03-18 RX ORDER — METOPROLOL SUCCINATE 25 MG/1
25 TABLET, EXTENDED RELEASE ORAL EVERY EVENING
Status: DISCONTINUED | OUTPATIENT
Start: 2025-03-18 | End: 2025-03-18 | Stop reason: HOSPADM

## 2025-03-18 RX ORDER — METOPROLOL SUCCINATE 25 MG/1
25 TABLET, EXTENDED RELEASE ORAL EVERY EVENING
Qty: 60 TABLET | Refills: 0 | Status: SHIPPED | OUTPATIENT
Start: 2025-03-18 | End: 2025-05-17

## 2025-03-18 RX ADMIN — FAMOTIDINE 20 MG: 20 TABLET, FILM COATED ORAL at 08:01

## 2025-03-18 RX ADMIN — TICAGRELOR 90 MG: 90 TABLET ORAL at 08:12

## 2025-03-18 RX ADMIN — ASPIRIN 81 MG: 81 TABLET, COATED ORAL at 08:01

## 2025-03-18 RX ADMIN — PERFLUTREN 10 ML: 6.52 INJECTION, SUSPENSION INTRAVENOUS at 11:17

## 2025-03-18 RX ADMIN — LISINOPRIL 10 MG: 10 TABLET ORAL at 08:01

## 2025-03-18 RX ADMIN — METOPROLOL TARTRATE 12.5 MG: 25 TABLET, FILM COATED ORAL at 08:01

## 2025-03-18 ASSESSMENT — ACTIVITIES OF DAILY LIVING (ADL)
ADLS_ACUITY_SCORE: 19
ADLS_ACUITY_SCORE: 20
ADLS_ACUITY_SCORE: 19
ADLS_ACUITY_SCORE: 20
ADLS_ACUITY_SCORE: 19
ADLS_ACUITY_SCORE: 20
ADLS_ACUITY_SCORE: 19
ADLS_ACUITY_SCORE: 20
ADLS_ACUITY_SCORE: 19
ADLS_ACUITY_SCORE: 19
ADLS_ACUITY_SCORE: 20

## 2025-03-18 NOTE — PROGRESS NOTES
03/18/25 0800   Appointment Info   Signing Clinician's Name / Credentials (PT) Cielo Gurrola PT   Living Environment   People in Home friend(s)   Current Living Arrangements house   Home Accessibility stairs within home   Number of Stairs, Within Home, Primary ten   Stair Railings, Within Home, Primary railings safe and in good condition;railing on right side (ascending)   Transportation Anticipated public transportation   Self-Care   Usual Activity Tolerance good   Current Activity Tolerance good   Regular Exercise No   Equipment Currently Used at Home none   Fall history within last six months no   General Information   Onset of Illness/Injury or Date of Surgery 03/16/25   Referring Physician Dr. Puga   Patient/Family Therapy Goals Statement (PT) To go home   Pertinent History of Current Problem (include personal factors and/or comorbidities that impact the POC) Pt is a 64 year old male s/p PCI x 2 to LAD.   Existing Precautions/Restrictions cardiac   Cognition   Affect/Mental Status (Cognition) WFL   Orientation Status (Cognition) oriented x 4   Follows Commands (Cognition) WFL   Pain Assessment   Patient Currently in Pain No   Range of Motion (ROM)   Range of Motion ROM is WFL   Strength (Manual Muscle Testing)   Strength (Manual Muscle Testing) strength is WFL   Bed Mobility   Bed Mobility no deficits identified   Transfers   Transfers no deficits identified   Gait/Stairs (Locomotion)   Liberty Level (Gait) independent   Balance   Balance Comments Good   Clinical Impression   Criteria for Skilled Therapeutic Intervention Yes, treatment indicated   PT Diagnosis (PT) Impaired endurance   Influenced by the following impairments Decreased endurance   Functional limitations due to impairments Difficulty with long distance ambulation   Clinical Presentation (PT Evaluation Complexity) stable   Clinical Presentation Rationale VSS, pain controlled   Clinical Decision Making (Complexity) low complexity    Planned Therapy Interventions (PT) patient/family education;progressive activity/exercise;risk factor education;home program guidelines   Risk & Benefits of therapy have been explained evaluation/treatment results reviewed;care plan/treatment goals reviewed;risks/benefits reviewed;current/potential barriers reviewed;participants voiced agreement with care plan;participants included;patient   PT Total Evaluation Time   PT Storm Low Complexity Minutes (61047) 10   PT Discharge Planning   PT Plan Progress treadmill time and speed, stairs, review education   PT Discharge Recommendation (DC Rec) home with outpatient cardiac rehab   PT Rationale for DC Rec Pt is independent with mobility and safe to discharge home. Pt will benefit from outpatient CR to further increase activity tolerance and for cardiac education.   PT Brief overview of current status Goals of therapy will be to address safe mobility and make recs for d/c to next level of care. Pt and RN will continue to follow all falls risk precautions as documented by RN staff while hospitalized. Independent.   PT Total Distance Amb During Session (feet) 1000   Physical Therapy Time and Intention   Total Session Time (sum of timed and untimed services) 10

## 2025-03-18 NOTE — PROGRESS NOTES
[facility]  Cardiology Progress Note  Date of Service: 03/18/2025  Primary Cardiologist: Dr. Calzada CKD and hypertension    Assessment & Plan    Fredy Chow is a 64 year old male with past medical history significant for CKD and HTN admitted on 3/16/2025 with chest pain    Assessment:  1.  NSTEMI, troponin peaked at 337.  Status post drug-eluting stent x 2 to the mid to distal LAD for a 80% mid lesion and no occluded distal lesion, remaining 50% proximal and mid RCA lesion but no significant circumflex disease or left main disease.  -On aspirin and Brilinta  -Echo pending    2.  CKD, creatinine baseline appears to be 1.4 this is improved today to 1.3 last BMP prior to this was in 2010 so unclear.    3.  Hypertension reasonable control today.  On lisinopril 10 mg daily.    4.  HLD, started on atorvastatin 40 mg once a day    Plan:   Start Toprol 25 mg daily  Continue other medications  Please send home on prn nitro  Dual antiplatelet uninterrupted for 1 year reinforced.   Outpt cardiac rehab  Echocardiogram pending  Likely discharge later today pending echocardiogram  Will arrange follow-up in the next 1-2 wks    Nunu Burden PA-C  [facility]  Pager: [phone]     Interval History   Feels well today.  Chest pain resolved completely after stenting.  Denies orthopnea or PND.  Right wrist is not particularly tender even though its ecchymotic.  Agreeable to new medication.  Notes family history of coronary artery disease.    Physical Exam   Temp: 98.7  F (37.1  C) Temp src: Oral BP: 131/87 Pulse: 91   Resp: 18 SpO2: 96 % O2 Device: None (Room air)    Vitals:    03/16/25 1903 03/17/25 0500 03/18/25 0622   Weight: 94.7 kg (208 lb 12.8 oz) 94.3 kg (207 lb 14.4 oz) 93.5 kg (206 lb 3.2 oz)     Well-developed well-nourished gentleman in no acute distress.  Normocephalic/atraumatic.  Heart is regular in rate and rhythm without murmur rub or gallop.  Lungs are clear without wheezes rales or  rhonchi.  Extremities without peripheral edema.  Right radial access clean dry intact with about a 4 cm area of ecchymosis    Medications   Current Facility-Administered Medications   Medication Dose Route Frequency Provider Last Rate Last Admin    Continuing beta blocker from home medication list OR beta blocker order already placed during this visit   Does not apply DOES NOT GO TO Wayne Meléndez MD        Continuing statin from home medication list OR statin order already placed during this visit   Does not apply DOES NOT GO TO Wayne Meléndez MD        Patient is already receiving anticoagulation with heparin, enoxaparin (LOVENOX), warfarin (COUMADIN)  or other anticoagulant medication   Does not apply Continuous PRN Manju Puga MD        Percutaneous Coronary Intervention orders placed (this is information for BPA alerting)   Does not apply DOES NOT GO TO Wayne Meléndez MD         Current Facility-Administered Medications   Medication Dose Route Frequency Provider Last Rate Last Admin    aspirin EC tablet 81 mg  81 mg Oral Daily Wayne James MD   81 mg at 03/18/25 0801    atorvastatin (LIPITOR) tablet 40 mg  40 mg Oral QPM Manju Puga MD   40 mg at 03/17/25 2046    famotidine (PEPCID) tablet 20 mg  20 mg Oral BID Manju Puga MD   20 mg at 03/18/25 0801    lisinopril (ZESTRIL) tablet 10 mg  10 mg Oral Daily Manju Puga MD   10 mg at 03/18/25 0801    metoprolol tartrate (LOPRESSOR) half-tab 12.5 mg  12.5 mg Oral BID Manju Puga MD   12.5 mg at 03/18/25 0801    sodium chloride (PF) 0.9% PF flush 3 mL  3 mL Intracatheter Q8H Manju Puga MD   3 mL at 03/18/25 0805    ticagrelor (BRILINTA) tablet 90 mg  90 mg Oral Q12H Wayne James MD   90 mg at 03/18/25 0812       Data   Last 24 hours labs reviewed     Imaging: neg cxr    Tele: sinus with SVT vs sinus tach overnight

## 2025-03-18 NOTE — DISCHARGE SUMMARY
"[facility]  Hospitalist Discharge Summary      Date of Admission:  3/16/2025  Date of Discharge:  {DISCHARGE DATE:016669}  Discharging Provider: Leon Andrews MD  Discharge Service: Hospitalist Service    Discharge Diagnoses   ***    Clinically Significant Risk Factors     # Obesity: Estimated body mass index is 31.35 kg/m  as calculated from the following:    Height as of this encounter: 1.727 m (5' 8\").    Weight as of this encounter: 93.5 kg (206 lb 3.2 oz).       Follow-ups Needed After Discharge   Follow-up Appointments       Hospital Follow-up with Existing Primary Care Provider (PCP)      Please see details below         Schedule Primary Care visit within: 7 Days           {Additional follow-up instructions/to-do's for PCP    :***}    Unresulted Labs Ordered in the Past 30 Days of this Admission       No orders found from 2/14/2025 to 3/17/2025.        These results will be followed up by ***    Discharge Disposition   {Discharge to:0160322::\"Discharged to home\"}  Condition at discharge: {CONDITION:495983::\"Stable\"}    Hospital Course   {OPTIONAL -- use to select A&P section   :066266}    Consultations This Hospital Stay   PHARMACY IP CONSULT  CARDIOLOGY IP CONSULT  HOSPITALIST IP CONSULT  NUTRITION SERVICES ADULT IP CONSULT  CARDIAC REHAB IP CONSULT  SMOKING CESSATION PROGRAM IP CONSULT    Code Status   Full Code    Time Spent on this Encounter   {How much time did you spend on discharge?:66833421}       Leon Andrews MD  [facility]  [address] [place] [address] [address]  [facility] [address]  Phone: [phone]  ______________________________________________________________________    Physical Exam   Vital Signs: Temp: 98.7  F (37.1  C) Temp src: Oral BP: 131/87 Pulse: 91   Resp: 18 SpO2: 96 % O2 Device: None (Room air)    Weight: 206 lbs 3.2 oz  {Recommend personal SmartPhrase or Notewriter for exam (OPTIONAL)   :313180}     "   Primary Care Physician   Caitlyn Clements    Discharge Orders      Cardiac Rehab  Referral      Follow-Up with Cardiology [facility]      Medication Instructions - Anticoagulants    Do NOT stop your aspirin or platelet inhibitor unless directed by your Cardiologist.  These medications help to prevent platelets in your blood from sticking together and forming a clot.  Examples of these medications are:  Ticagrelor (Brilinta), Clopidigrel (Plavix), Prasugrel (Effient)     When to call - Contact the Heart Clinic    You may experience symptoms that require follow-up before your scheduled appointment. Contact the Heart Clinic if you develop: Fever over 100.4o Fahrenheit, that lasts more than one day; Redness, heat, or pus at the puncture site; Change in color or temperature in your hand or arm.     When to call - Reasons to Call 911    If your wrist puncture site starts bleeding after discharge, sit down and apply firm pressure with your thumb against the puncture site and fingers against the back of the wrist for 10 minutes. If the bleeding stops, continue to rest, keeping your wrist still for 2 hours. Notify your doctor as soon as possible.  IF BLEEDING DOES NOT STOP OR THERE IS A LARGER AMOUNT OF BLEEDING OR SPURTING CALL 9-1-1 immediately.DO NOT drive yourself to the hospital.     Precautions - Lifting    DO NOT lift more than 5 pounds with affected arm for 48 hours     Precautions - Household Activities    Avoid excessive bending or movement of your wrist for 72 hours.  Do not subject hand/arm to any forceful movements for 24 hours, such as supporting weight when rising from a chair or bed.     Remove the band-aid on the puncture site after 24 hours and leave open to air. If minor oozing, you may apply a band-aid and remove after 12 hours.     Precautions - Active Sports Activities    DO NOT engage in vigorous exercise using your affected arm for 3 days after discharge.  This includes golf, tennis or swimming.      Precautions - Operating yard equipment or vehicles    Do not operate a chainsaw, lawnmower, motorcycle, or all-terrain vehicle for 48 hours after the procedure.     Precautions - Elective Dental Work    NO elective dental work for 6 weeks after receiving a stent.     Comfort and Pain Management - Bruising after Surgery    Expect mild tingling of hand and tenderness at the wrist puncture site for up to 3 days. You may take Tylenol or a pain medicine recommended by your doctor.     Activity - Cardiac Rehab    You are encouraged to enroll in an Outpatient Cardiac Rehab program after discharge from the hospital.  Our Cardiac Rehab staff may visit briefly with you while you're in the hospital.  If they miss you, someone will contact you after you are home.     Return to Driving    Driving is NOT permitted for 24 hours after surgery     Return to work    You may return to work after 72 hours if you are feeling well and your job does not involve heavy lifting.     Shower / Bathing    You may shower on the day after your procedure.  DO NOT soak of wrist with the puncture site in water for 3 days to prevent infection. DO NOT take a tub bath or wash dishes for 3 days after the procedure     Dressing Removal    Remove the band-aid on the puncture site after 24 hours and leave open to air. If minor oozing, you may apply a band-aid and remove after 12 hours     Reason for your hospital stay    Chest pain -NSTEMI underwent Cardiac Catherization with stent placement     Activity    Your activity upon discharge: activity as tolerated     Diet    Follow this diet upon discharge: Current Diet:Orders Placed This Encounter      Low Saturated Fat Na <2400 mg     Hospital Follow-up with Existing Primary Care Provider (PCP)    Please see details below            Significant Results and Procedures   {Data for Discharge Summary:230983}    Discharge Medications   Current Discharge Medication List        START taking these medications     Details   aspirin (ASA) 81 MG chewable tablet Take 1 tablet (81 mg) by mouth daily. Starting tomorrow.  Qty: 30 tablet, Refills: 3    Associated Diagnoses: NSTEMI (non-ST elevated myocardial infarction) (H)      atorvastatin (LIPITOR) 40 MG tablet Take 1 tablet (40 mg) by mouth daily.  Qty: 90 tablet, Refills: 3    Associated Diagnoses: NSTEMI (non-ST elevated myocardial infarction) (H)      metoprolol succinate ER (TOPROL XL) 25 MG 24 hr tablet Take 1 tablet (25 mg) by mouth every evening.    Associated Diagnoses: NSTEMI (non-ST elevated myocardial infarction) (H)      nitroGLYcerin (NITROSTAT) 0.4 MG sublingual tablet For chest pain place 1 tablet under the tongue every 5 minutes for 3 doses. If symptoms persist 5 minutes after 1st dose call 911.  Qty: 30 tablet, Refills: 0    Associated Diagnoses: NSTEMI (non-ST elevated myocardial infarction) (H)      ticagrelor (BRILINTA) 90 MG tablet Take 1 tablet (90 mg) by mouth 2 times daily. Dose to start this evening.  Qty: 180 tablet, Refills: 3    Associated Diagnoses: NSTEMI (non-ST elevated myocardial infarction) (H)           CONTINUE these medications which have NOT CHANGED    Details   lisinopril (ZESTRIL) 10 MG tablet TAKE ONE TABLET BY MOUTH ONCE DAILY  Qty: 30 tablet, Refills: 1    Associated Diagnoses: Benign essential hypertension           Allergies   No Known Allergies   [id number]     Name: EDUARDO BENDER  MRN: [id number]  : 1960  Study Date: 2025 10:15 AM  Age: 64 yrs  Gender: Male  Patient Location: [facility]  Reason For Study: Chest Pain  Ordering Physician: ERIC CALDERA  Referring Physician: ERIC CALDERA  Performed By: Alicja Armas     BSA: 2.1 m2  Height: 68 in  Weight: 209 lb  HR: 92  BP: 155/95 mmHg  ______________________________________________________________________________  Procedure  Echocardiogram with two-dimensional, color and spectral Doppler. Definity (NDC  #28427-901) given intravenously.  ______________________________________________________________________________  Interpretation Summary     1. The left ventricle is normal in size. There is normal left ventricular wall  thickness. Left ventricular systolic function is normal. The visual ejection  fraction is 55-60%. Diastolic Doppler findings (E/E' ratio and/or other  parameters) suggest left ventricular filling pressures are indeterminate. No  regional wall motion abnormalities noted. There is no thrombus seen in the  left ventricle.  2. The right ventricle is normal size. The right ventricular systolic function  is normal.  3. Trace mitral and tricuspid regurgitation.  4. No pericardial effusion.  ______________________________________________________________________________  Left Ventricle  The left ventricle is normal in size. There is normal left ventricular wall  thickness. Left ventricular systolic function is normal. The visual ejection  fraction is 55-60%. Diastolic Doppler findings (E/E' ratio and/or other  parameters) suggest left ventricular filling pressures are indeterminate. No  regional wall motion abnormalities noted. There is no thrombus seen in the  left ventricle.     Right Ventricle  The right ventricle is normal size. The right ventricular systolic function is  normal.     Atria  Normal left atrial size. Right atrial size is normal. There is no  color  Doppler evidence of an atrial shunt.     Mitral Valve  There is trace mitral regurgitation.     Tricuspid Valve  There is trace tricuspid regurgitation. Right ventricular systolic pressure  could not be approximated due to inadequate tricuspid regurgitation.     Aortic Valve  There is mild trileaflet aortic sclerosis. No aortic regurgitation is present.  No aortic stenosis is present.     Pulmonic Valve  There is trace pulmonic valvular regurgitation. There is no pulmonic valvular  stenosis.     Vessels  The aortic root is normal size. Normal size ascending aorta. The inferior vena  cava is normal.     Pericardium  There is no pericardial effusion.     Rhythm  Sinus rhythm was noted.  ______________________________________________________________________________  MMode/2D Measurements & Calculations  IVSd: 1.0 cm     LVIDd: 4.2 cm  LVIDs: 3.0 cm  LVPWd: 0.97 cm  FS: 27.6 %  LV mass(C)d: 137.5 grams  LV mass(C)dI: 66.0 grams/m2  Ao root diam: 3.5 cm  LA dimension: 3.4 cm  asc Aorta Diam: 3.6 cm  LA/Ao: 0.95  LVOT diam: 1.8 cm  LVOT area: 2.6 cm2  Ao root diam index Ht(cm/m): 2.0  Ao root diam index BSA (cm/m2): 1.7  Asc Ao diam index BSA (cm/m2): 1.7  Asc Ao diam index Ht(cm/m): 2.1  LA Volume (BP): 36.6 ml     LA Volume Index (BP): 17.6 ml/m2  RV Base: 3.3 cm  RWT: 0.47  TAPSE: 1.6 cm     Doppler Measurements & Calculations  MV E max hu: 61.3 cm/sec  MV A max hu: 90.8 cm/sec  MV E/A: 0.68  MV dec time: 0.20 sec  Ao V2 max: 134.0 cm/sec  Ao max P.0 mmHg  Ao V2 mean: 93.1 cm/sec  Ao mean P.0 mmHg  Ao V2 VTI: 22.8 cm  PAULA(I,D): 2.2 cm2  PAULA(V,D): 2.3 cm2  LV V1 max P.8 mmHg  LV V1 max: 120.0 cm/sec  LV V1 VTI: 19.3 cm  SV(LVOT): 49.6 ml  SI(LVOT): 23.8 ml/m2  PA acc time: 0.13 sec  AV Hu Ratio (DI): 0.90  PAULA Index (cm2/m2): 1.0  E/E' av.7     Lateral E/e': 8.3  Medial E/e': 11.0  RV S Hu: 10.9 cm/sec     ______________________________________________________________________________  Report  approved by: Rayna Nieto MD on 03/18/2025 12:30 PM         Cardiac Catheterization    Narrative    NSTEMI due to occluded mid to distal LAD  Status post successful PCI with 2 drug-eluting stent placement in the mid   and distal LAD segments         Discharge Medications   Current Discharge Medication List        START taking these medications    Details   aspirin (ASA) 81 MG chewable tablet Take 1 tablet (81 mg) by mouth daily. Starting tomorrow.  Qty: 30 tablet, Refills: 3    Associated Diagnoses: NSTEMI (non-ST elevated myocardial infarction) (H)      atorvastatin (LIPITOR) 40 MG tablet Take 1 tablet (40 mg) by mouth daily.  Qty: 90 tablet, Refills: 3    Associated Diagnoses: NSTEMI (non-ST elevated myocardial infarction) (H)      metoprolol succinate ER (TOPROL XL) 25 MG 24 hr tablet Take 1 tablet (25 mg) by mouth every evening.    Associated Diagnoses: NSTEMI (non-ST elevated myocardial infarction) (H)      nitroGLYcerin (NITROSTAT) 0.4 MG sublingual tablet For chest pain place 1 tablet under the tongue every 5 minutes for 3 doses. If symptoms persist 5 minutes after 1st dose call 911.  Qty: 30 tablet, Refills: 0    Associated Diagnoses: NSTEMI (non-ST elevated myocardial infarction) (H)      ticagrelor (BRILINTA) 90 MG tablet Take 1 tablet (90 mg) by mouth 2 times daily. Dose to start this evening.  Qty: 180 tablet, Refills: 3    Associated Diagnoses: NSTEMI (non-ST elevated myocardial infarction) (H)           CONTINUE these medications which have NOT CHANGED    Details   lisinopril (ZESTRIL) 10 MG tablet TAKE ONE TABLET BY MOUTH ONCE DAILY  Qty: 30 tablet, Refills: 1    Associated Diagnoses: Benign essential hypertension           Allergies   No Known Allergies

## 2025-03-18 NOTE — PLAN OF CARE
Shift Summary ([id number]):     A&Ox4, pleasant. Tele shows ST. Hypertensive but not needing PRNs. Denies pain. R radial site ecchymotic, CMS intact. LS clear. Tolerating diet. AUO via urinal. PIV SL. Possible discharge tomorrow.

## 2025-03-18 NOTE — PLAN OF CARE
Goal Outcome Evaluation:      Plan of Care Reviewed With: patient, friend      Ok to discharge per Dr Calzada. Recd discharge orders. Review follow up appt. New meds, Radial angio instructions. Await discharge meds, ready for discharge

## 2025-03-18 NOTE — PLAN OF CARE
Goal Outcome Evaluation:      Plan of Care Reviewed With: patient    Overall Patient Progress: improvingOverall Patient Progress: improving    Outcome Evaluation: Pt alert and oriented x4. VSS. Lungs clear on room air. Denies pain and any dizziness. Pt sinus/sinus tach. Tolerating low fat diet. R. Radial site ecchymotic, CMS intact. Plan for ECHO today, potential discharge today.      Problem: Adult Inpatient Plan of Care  Goal: Plan of Care Review  Description: The Plan of Care Review/Shift note should be completed every shift.  The Outcome Evaluation is a brief statement about your assessment that the patient is improving, declining, or no change.  This information will be displayed automatically on your shift  note.  Outcome: Progressing  Flowsheets (Taken 3/18/2025 0504)  Outcome Evaluation: Pt alert and oriented x4. VSS. Lungs clear on room air. Denies pain and any dizziness. Pt sinus/sinus tach. Tolerating low fat diet. R. Radial site ecchymotic, CMS intact. Plan for ECHO today, potential discharge today.  Plan of Care Reviewed With: patient  Overall Patient Progress: improving  Goal: Absence of Hospital-Acquired Illness or Injury  Intervention: Identify and Manage Fall Risk  Recent Flowsheet Documentation  Taken 3/18/2025 0200 by Makayla Hancock RN  Safety Promotion/Fall Prevention:   activity supervised   assistive device/personal items within reach   clutter free environment maintained   increased rounding and observation   patient and family education   room near nurse's station   room organization consistent   safety round/check completed  Intervention: Prevent Skin Injury  Recent Flowsheet Documentation  Taken 3/18/2025 0200 by Makayla Hancock RN  Body Position: position changed independently  Intervention: Prevent Infection  Recent Flowsheet Documentation  Taken 3/18/2025 0200 by Makayla Hancock RN  Infection Prevention:   rest/sleep promoted   single patient room provided   hand hygiene promoted      Problem: Cardiac Catheterization (Diagnostic/Interventional)  Goal: Anesthesia/Sedation Recovery  Intervention: Optimize Anesthesia Recovery  Recent Flowsheet Documentation  Taken 3/18/2025 0200 by Makayla Hancock, RN  Safety Promotion/Fall Prevention:   activity supervised   assistive device/personal items within reach   clutter free environment maintained   increased rounding and observation   patient and family education   room near nurse's station   room organization consistent   safety round/check completed  Goal: Absence of Vascular Access Complication  Intervention: Prevent and Manage Access Complications  Recent Flowsheet Documentation  Taken 3/18/2025 0200 by Makayla Hancock, RN  Activity Management: activity adjusted per tolerance  Head of Bed (HOB) Positioning: HOB at 30 degrees

## 2025-03-18 NOTE — PLAN OF CARE
"Patient is here to discuss follow up    BP (!) 154/77 (BP Location: Right arm, Patient Position: Chair, Cuff Size: Adult Regular)   Pulse 80   Ht 5' 10\" (1.778 m)   Wt 169 lb 12.8 oz (77 kg)   SpO2 98%   BMI 24.36 kg/m      Questions patient would like addressed today are: N/A.    Refills are needed: No    Has homecare services and agency name:  [facility]    " Physical Therapy Discharge Summary    Reason for therapy discharge:    Discharged to home with outpatient therapy.    Progress towards therapy goal(s). See goals on Care Plan in [facility] electronic health record for goal details.  Goals partially met.  Barriers to achieving goals:   discharge from facility.    Therapy recommendation(s):    Continued therapy is recommended.  Rationale/Recommendations:  To further increase cardiac education and endurance.

## 2025-03-19 LAB
ATRIAL RATE - MUSE: 96 BPM
DIASTOLIC BLOOD PRESSURE - MUSE: NORMAL MMHG
INTERPRETATION ECG - MUSE: NORMAL
P AXIS - MUSE: 50 DEGREES
PR INTERVAL - MUSE: 140 MS
QRS DURATION - MUSE: 80 MS
QT - MUSE: 376 MS
QTC - MUSE: 475 MS
R AXIS - MUSE: 53 DEGREES
SYSTOLIC BLOOD PRESSURE - MUSE: NORMAL MMHG
T AXIS - MUSE: 77 DEGREES
VENTRICULAR RATE- MUSE: 96 BPM

## 2025-03-20 ENCOUNTER — HOSPITAL ENCOUNTER (OUTPATIENT)
Dept: CARDIAC REHAB | Facility: CLINIC | Age: 65
Discharge: HOME | End: 2025-03-20
Attending: HOSPITALIST
Payer: COMMERCIAL

## 2025-03-20 ENCOUNTER — TELEPHONE (OUTPATIENT)
Facility: CLINIC | Age: 65
End: 2025-03-20

## 2025-03-20 PROCEDURE — 93798 PHYS/QHP OP CAR RHAB W/ECG: CPT | Performed by: REHABILITATION PRACTITIONER

## 2025-03-20 PROCEDURE — 93797 PHYS/QHP OP CAR RHAB WO ECG: CPT | Performed by: REHABILITATION PRACTITIONER

## 2025-03-25 ENCOUNTER — APPOINTMENT (OUTPATIENT)
Dept: GENERAL RADIOLOGY | Facility: CLINIC | Age: 65
End: 2025-03-25
Attending: EMERGENCY MEDICINE
Payer: COMMERCIAL

## 2025-03-25 ENCOUNTER — APPOINTMENT (OUTPATIENT)
Dept: ULTRASOUND IMAGING | Facility: CLINIC | Age: 65
End: 2025-03-25
Attending: EMERGENCY MEDICINE
Payer: COMMERCIAL

## 2025-03-25 ENCOUNTER — HOSPITAL ENCOUNTER (OUTPATIENT)
Facility: CLINIC | Age: 65
Setting detail: OBSERVATION
End: 2025-03-25
Attending: EMERGENCY MEDICINE | Admitting: STUDENT IN AN ORGANIZED HEALTH CARE EDUCATION/TRAINING PROGRAM
Payer: COMMERCIAL

## 2025-03-25 DIAGNOSIS — I21.4 NSTEMI (NON-ST ELEVATED MYOCARDIAL INFARCTION) (H): ICD-10-CM

## 2025-03-25 DIAGNOSIS — R07.9 CHEST PAIN, UNSPECIFIED TYPE: ICD-10-CM

## 2025-03-25 PROBLEM — R09.89 LABILE BLOOD PRESSURE: Status: ACTIVE | Noted: 2025-03-03

## 2025-03-25 LAB
ALBUMIN SERPL BCG-MCNC: 4.3 G/DL (ref 3.5–5.2)
ALP SERPL-CCNC: 178 U/L (ref 40–150)
ALT SERPL W P-5'-P-CCNC: 35 U/L (ref 0–70)
ANION GAP SERPL CALCULATED.3IONS-SCNC: 11 MMOL/L (ref 7–15)
AST SERPL W P-5'-P-CCNC: 28 U/L (ref 0–45)
ATRIAL RATE - MUSE: 95 BPM
BASOPHILS # BLD AUTO: 0 10E3/UL (ref 0–0.2)
BASOPHILS NFR BLD AUTO: 0 %
BILIRUB SERPL-MCNC: 1 MG/DL
BUN SERPL-MCNC: 25.7 MG/DL (ref 8–23)
CALCIUM SERPL-MCNC: 9.2 MG/DL (ref 8.8–10.4)
CHLORIDE SERPL-SCNC: 102 MMOL/L (ref 98–107)
CREAT SERPL-MCNC: 1.81 MG/DL (ref 0.67–1.17)
DIASTOLIC BLOOD PRESSURE - MUSE: NORMAL MMHG
EGFRCR SERPLBLD CKD-EPI 2021: 41 ML/MIN/1.73M2
EOSINOPHIL # BLD AUTO: 0.1 10E3/UL (ref 0–0.7)
EOSINOPHIL NFR BLD AUTO: 1 %
ERYTHROCYTE [DISTWIDTH] IN BLOOD BY AUTOMATED COUNT: 12.2 % (ref 10–15)
GLUCOSE SERPL-MCNC: 103 MG/DL (ref 70–99)
HCO3 SERPL-SCNC: 24 MMOL/L (ref 22–29)
HCT VFR BLD AUTO: 43.7 % (ref 40–53)
HGB BLD-MCNC: 15.1 G/DL (ref 13.3–17.7)
HOLD SPECIMEN: NORMAL
IMM GRANULOCYTES # BLD: 0.1 10E3/UL
IMM GRANULOCYTES NFR BLD: 1 %
INTERPRETATION ECG - MUSE: NORMAL
LYMPHOCYTES # BLD AUTO: 2.1 10E3/UL (ref 0.8–5.3)
LYMPHOCYTES NFR BLD AUTO: 20 %
MCH RBC QN AUTO: 30.7 PG (ref 26.5–33)
MCHC RBC AUTO-ENTMCNC: 34.6 G/DL (ref 31.5–36.5)
MCV RBC AUTO: 89 FL (ref 78–100)
MONOCYTES # BLD AUTO: 0.9 10E3/UL (ref 0–1.3)
MONOCYTES NFR BLD AUTO: 8 %
NEUTROPHILS # BLD AUTO: 7.3 10E3/UL (ref 1.6–8.3)
NEUTROPHILS NFR BLD AUTO: 70 %
NRBC # BLD AUTO: 0 10E3/UL
NRBC BLD AUTO-RTO: 0 /100
NT-PROBNP SERPL-MCNC: 265 PG/ML (ref 0–900)
P AXIS - MUSE: 43 DEGREES
PLATELET # BLD AUTO: 262 10E3/UL (ref 150–450)
POTASSIUM SERPL-SCNC: 4.9 MMOL/L (ref 3.4–5.3)
PR INTERVAL - MUSE: 136 MS
PROT SERPL-MCNC: 7.7 G/DL (ref 6.4–8.3)
QRS DURATION - MUSE: 78 MS
QT - MUSE: 344 MS
QTC - MUSE: 432 MS
R AXIS - MUSE: 19 DEGREES
RBC # BLD AUTO: 4.92 10E6/UL (ref 4.4–5.9)
SODIUM SERPL-SCNC: 137 MMOL/L (ref 135–145)
SYSTOLIC BLOOD PRESSURE - MUSE: NORMAL MMHG
T AXIS - MUSE: 70 DEGREES
TROPONIN T SERPL HS-MCNC: 266 NG/L
TROPONIN T SERPL HS-MCNC: 270 NG/L
VENTRICULAR RATE- MUSE: 95 BPM
WBC # BLD AUTO: 10.5 10E3/UL (ref 4–11)

## 2025-03-25 PROCEDURE — 83880 ASSAY OF NATRIURETIC PEPTIDE: CPT | Performed by: EMERGENCY MEDICINE

## 2025-03-25 PROCEDURE — 85041 AUTOMATED RBC COUNT: CPT | Performed by: EMERGENCY MEDICINE

## 2025-03-25 PROCEDURE — 99285 EMERGENCY DEPT VISIT HI MDM: CPT | Mod: 25

## 2025-03-25 PROCEDURE — 85004 AUTOMATED DIFF WBC COUNT: CPT | Performed by: EMERGENCY MEDICINE

## 2025-03-25 PROCEDURE — 80053 COMPREHEN METABOLIC PANEL: CPT | Performed by: EMERGENCY MEDICINE

## 2025-03-25 PROCEDURE — 99222 1ST HOSP IP/OBS MODERATE 55: CPT | Performed by: STUDENT IN AN ORGANIZED HEALTH CARE EDUCATION/TRAINING PROGRAM

## 2025-03-25 PROCEDURE — 36415 COLL VENOUS BLD VENIPUNCTURE: CPT | Performed by: EMERGENCY MEDICINE

## 2025-03-25 PROCEDURE — 93005 ELECTROCARDIOGRAM TRACING: CPT

## 2025-03-25 PROCEDURE — G0378 HOSPITAL OBSERVATION PER HR: HCPCS

## 2025-03-25 PROCEDURE — 250N000013 HC RX MED GY IP 250 OP 250 PS 637: Performed by: STUDENT IN AN ORGANIZED HEALTH CARE EDUCATION/TRAINING PROGRAM

## 2025-03-25 PROCEDURE — 93971 EXTREMITY STUDY: CPT | Mod: LT

## 2025-03-25 PROCEDURE — 84484 ASSAY OF TROPONIN QUANT: CPT | Performed by: EMERGENCY MEDICINE

## 2025-03-25 PROCEDURE — 71046 X-RAY EXAM CHEST 2 VIEWS: CPT

## 2025-03-25 RX ORDER — LIDOCAINE 40 MG/G
CREAM TOPICAL
Status: DISCONTINUED | OUTPATIENT
Start: 2025-03-25 | End: 2025-03-30 | Stop reason: HOSPADM

## 2025-03-25 RX ORDER — ONDANSETRON 4 MG/1
4 TABLET, ORALLY DISINTEGRATING ORAL EVERY 6 HOURS PRN
Status: DISCONTINUED | OUTPATIENT
Start: 2025-03-25 | End: 2025-03-27

## 2025-03-25 RX ORDER — ASPIRIN 81 MG/1
81 TABLET, CHEWABLE ORAL DAILY
Status: DISCONTINUED | OUTPATIENT
Start: 2025-03-26 | End: 2025-03-27

## 2025-03-25 RX ORDER — NITROGLYCERIN 0.4 MG/1
0.4 TABLET SUBLINGUAL EVERY 5 MIN PRN
Status: DISCONTINUED | OUTPATIENT
Start: 2025-03-25 | End: 2025-03-27

## 2025-03-25 RX ORDER — METOPROLOL SUCCINATE 25 MG/1
25 TABLET, EXTENDED RELEASE ORAL EVERY EVENING
Status: DISCONTINUED | OUTPATIENT
Start: 2025-03-25 | End: 2025-03-27

## 2025-03-25 RX ORDER — ONDANSETRON 2 MG/ML
4 INJECTION INTRAMUSCULAR; INTRAVENOUS EVERY 6 HOURS PRN
Status: DISCONTINUED | OUTPATIENT
Start: 2025-03-25 | End: 2025-03-27

## 2025-03-25 RX ORDER — LISINOPRIL 10 MG/1
10 TABLET ORAL EVERY EVENING
Status: DISCONTINUED | OUTPATIENT
Start: 2025-03-25 | End: 2025-03-28

## 2025-03-25 RX ORDER — PROCHLORPERAZINE MALEATE 10 MG
10 TABLET ORAL EVERY 6 HOURS PRN
Status: DISCONTINUED | OUTPATIENT
Start: 2025-03-25 | End: 2025-03-30 | Stop reason: HOSPADM

## 2025-03-25 RX ORDER — AMOXICILLIN 250 MG
2 CAPSULE ORAL 2 TIMES DAILY PRN
Status: DISCONTINUED | OUTPATIENT
Start: 2025-03-25 | End: 2025-03-30 | Stop reason: HOSPADM

## 2025-03-25 RX ORDER — ATORVASTATIN CALCIUM 40 MG/1
40 TABLET, FILM COATED ORAL EVERY EVENING
Status: DISCONTINUED | OUTPATIENT
Start: 2025-03-25 | End: 2025-03-30 | Stop reason: HOSPADM

## 2025-03-25 RX ORDER — AMOXICILLIN 250 MG
1 CAPSULE ORAL 2 TIMES DAILY PRN
Status: DISCONTINUED | OUTPATIENT
Start: 2025-03-25 | End: 2025-03-30 | Stop reason: HOSPADM

## 2025-03-25 RX ADMIN — LISINOPRIL 10 MG: 10 TABLET ORAL at 21:07

## 2025-03-25 RX ADMIN — METOPROLOL SUCCINATE 25 MG: 25 TABLET, EXTENDED RELEASE ORAL at 21:04

## 2025-03-25 RX ADMIN — TICAGRELOR 90 MG: 90 TABLET ORAL at 21:07

## 2025-03-25 RX ADMIN — ATORVASTATIN CALCIUM 40 MG: 40 TABLET, FILM COATED ORAL at 21:04

## 2025-03-25 ASSESSMENT — ACTIVITIES OF DAILY LIVING (ADL)
ADLS_ACUITY_SCORE: 42

## 2025-03-25 NOTE — Clinical Note
femoral artery Cine(s)  injected utilizing power injector system. R femoral artery injection for possible use of closure device

## 2025-03-25 NOTE — ED NOTES
"[facility]  ED Nurse Handoff Report    ED Chief complaint: Chest Pain      ED Diagnosis:   Final diagnoses:   Chest pain, unspecified type   Recent NSTEMI (non-ST elevated myocardial infarction) (H)       Code Status: Full Code    Allergies: No Known Allergies    Patient Story: chest pain while at work; took his SL nitroglycerin and called EMS  Focused Assessment:  pt recently had a stent placed and has been back to normal activity; pt's chest pain resolved after 1 nitroglycerin and ASA     Treatments and/or interventions provided: obs  Patient's response to treatments and/or interventions: well    To be done/followed up on inpatient unit:  obs    Does this patient have any cognitive concerns?:  no    Activity level - Baseline/Home:  Independent  Activity Level - Current:   Independent    Patient's Preferred language: English   Needed?: No    Isolation: None  Infection: Not Applicable  Patient tested for COVID 19 prior to admission: NO  Bariatric?: No    Vital Signs:   Vitals:    03/25/25 1422 03/25/25 1423 03/25/25 1430 03/25/25 1545   BP:   (!) 136/96 (!) 149/95   Pulse: 101  102 97   Resp: 15  11 14   Temp:  98.8  F (37.1  C)     TempSrc:  Oral     SpO2: 97%  97% 98%   Weight:  91 kg (200 lb 9.9 oz)     Height:  1.727 m (5' 8\")         Cardiac Rhythm:     Was the PSS-3 completed:   Yes  What interventions are required if any?               Family Comments:   OBS brochure/video discussed/provided to patient/family: No              Name of person given brochure if not patient:               Relationship to patient:     For the majority of the shift this patient's behavior was Green.   Behavioral interventions performed were .    ED NURSE PHONE NUMBER: *[id number]         "

## 2025-03-25 NOTE — Clinical Note
Max pressure = 15 atilio. Total duration = 60 seconds.     Max pressure = 15 atilio. Total duration = 13 seconds.    Balloon reinflated a second time: Max pressure = 15 atilio. Total duration = 13 seconds.  Balloon reinflated a third time: Max pressure = 19 atilio. Total duration = 23 seconds.  Balloon reinflated a fourth time: Max pressure = 20 atilio. Total duration = 32 seconds.

## 2025-03-25 NOTE — ED PROVIDER NOTES
Emergency Department Note      History of Present Illness     Chief Complaint   Chest Pain      HPI   Fredy Chow is a 64 year old male who came in by ambulance for further evaluation of chest pressure.  The patient was just in the hospital last week with an NSTEMI and had an angiogram and 2 stents placed.  Shortly after lunch today he developed chest pressure while at work.  He denies any shortness of breath or nausea.  This is different than what he had prior to his stent placement, but he has not had this pressure previously.  He did take 1 nitroglycerin at work, and his pain eventually went away about 1/2-hour later when he was in the ambulance on the way here.  EMS provided him oral aspirin.  He also has been having some left thigh and calf pain for the last few days.  Additionally, a couple of nights after discharge from the hospital he did have an episode of left arm pain that went away on its own.    Independent Historian   None    Review of External Notes   I reviewed the discharge summary from 3/18/2025 when he was admitted for an NSTEMI and had stents placed.    Past Medical History     Medical History and Problem List   Past Medical History:   Diagnosis Date    Anal condyloma     Genital herpes     Schwannoma        Medications   aspirin (ASA) 81 MG chewable tablet  atorvastatin (LIPITOR) 40 MG tablet  lisinopril (ZESTRIL) 10 MG tablet  metoprolol succinate ER (TOPROL XL) 25 MG 24 hr tablet  nitroGLYcerin (NITROSTAT) 0.4 MG sublingual tablet  ticagrelor (BRILINTA) 90 MG tablet        Surgical History   Past Surgical History:   Procedure Laterality Date    BACK SURGERY  04/23/2002    CV CORONARY ANGIOGRAM N/A 3/17/2025    Procedure: Coronary Angiogram;  Surgeon: Juan Garrett MD;  Location: [facility] CARDIAC CATH LAB    CV INTRAVASULAR ULTRASOUND N/A 3/17/2025    Procedure: Intravascular Ultrasound;  Surgeon: Juan Garrett MD;  Location: [facility] CARDIAC CATH LAB    CV PCI N/A  "3/17/2025    Procedure: Percutaneous Coronary Intervention;  Surgeon: Juan Garrett MD;  Location:  [facility] CARD[facility] CATH LAB       Physical Exam     Patient Vitals for the past 24 hrs:   BP Temp Temp src Pulse Resp SpO2 Height Weight   03/25/25 1545 (!) 149/95 -- -- 97 14 98 % -- --   03/25/25 1430 (!) 136/96 -- -- 102 11 97 % -- --   03/25/25 1423 -- 98.8  F (37.1  C) Oral -- -- -- 1.727 m (5' 8\") 91 kg (200 lb 9.9 oz)   03/25/25 1422 -- -- -- 101 15 97 % -- --   03/25/25 1421 (!) 157/96 -- -- 99 18 96 % -- --     Physical Exam  Nursing note and vitals reviewed.  Constitutional:  Oriented to person, place, and time. Cooperative.   HENT:   Nose:    Nose normal.   Mouth/Throat:   Mucous membranes are normal.   Eyes:    Conjunctivae normal and EOM are normal.      Pupils are equal, round, and reactive to light.   Neck:    Trachea normal.   Cardiovascular:  Normal rate, regular rhythm, normal heart sounds and normal pulses. No murmur heard.  Pulmonary/Chest:  Effort normal and breath sounds normal.   Abdominal:   Soft. Normal appearance and bowel sounds are normal.      There is no tenderness.      There is no rebound and no CVA tenderness.   Musculoskeletal:  Left lower extremity is normal in appearance with some mild tenderness to palpation in the left calf.  Very minimal bilateral lower extremity pitting edema bilaterally.  Extremities atraumatic x 4.   Lymphadenopathy:  No cervical adenopathy.   Neurological:   Alert and oriented to person, place, and time. Normal strength.      No cranial nerve deficit or sensory deficit. GCS eye subscore is 4. GCS verbal subscore is 5. GCS motor subscore is 6.   Skin:    Skin is intact. No rash noted.   Psychiatric:   Normal mood and affect.      Diagnostics     Lab Results   Labs Ordered and Resulted from Time of ED Arrival to Time of ED Departure   COMPREHENSIVE METABOLIC PANEL - Abnormal       Result Value    Sodium 137      Potassium 4.9      Carbon Dioxide (CO2) 24      " Anion Gap 11      Urea Nitrogen 25.7 (*)     Creatinine 1.81 (*)     GFR Estimate 41 (*)     Calcium 9.2      Chloride 102      Glucose 103 (*)     Alkaline Phosphatase 178 (*)     AST 28      ALT 35      Protein Total 7.7      Albumin 4.3      Bilirubin Total 1.0     TROPONIN T, HIGH SENSITIVITY - Abnormal    Troponin T, High Sensitivity 270 (*)    NT PROBNP INPATIENT - Normal    N terminal Pro BNP Inpatient 265     CBC WITH PLATELETS AND DIFFERENTIAL    WBC Count 10.5      RBC Count 4.92      Hemoglobin 15.1      Hematocrit 43.7      MCV 89      MCH 30.7      MCHC 34.6      RDW 12.2      Platelet Count 262      % Neutrophils 70      % Lymphocytes 20      % Monocytes 8      % Eosinophils 1      % Basophils 0      % Immature Granulocytes 1      NRBCs per 100 WBC 0      Absolute Neutrophils 7.3      Absolute Lymphocytes 2.1      Absolute Monocytes 0.9      Absolute Eosinophils 0.1      Absolute Basophils 0.0      Absolute Immature Granulocytes 0.1      Absolute NRBCs 0.0     TROPONIN T, HIGH SENSITIVITY       Imaging   XR Chest 2 Views   Final Result   IMPRESSION: Negative chest.      US Lower Extremity Venous Duplex Left   Final Result   IMPRESSION:   No deep venous thrombosis in the left lower extremity.          EKG   ECG results from 03/25/25   EKG 12-lead, tracing only     Value    Systolic Blood Pressure     Diastolic Blood Pressure     Ventricular Rate 95    Atrial Rate 95    DC Interval 136    QRS Duration 78        QTc 432    P Axis 43    R AXIS 19    T Axis 70    Interpretation ECG      Sinus rhythm  Nonspecific T wave abnormality  Abnormal ECG  When compared with ECG of 18-Mar-2025 06:52,  Premature atrial complexes are no longer Present  Confirmed by GENERATED REPORT, COMPUTER ([id number]),  Monserrat Marie ([id number]) on 3/25/2025 3:10:03 PM           Independent Interpretation   I dependently interpreted the patient's chest x-ray and do not appreciate any pulmonary edema.    ED Course      Medications  Administered   Medications - No data to display    Procedures   Procedures     Discussion of Management   Admitting Hospitalist, Dr. Garcia    ED Course        Additional Documentation  None    Medical Decision Making / Diagnosis     CMS Diagnoses: None    MIPS       None    MDM   Fredy Chow is a 64 year old male who came in by ambulance for further evaluation of chest pressure.  This is in the setting of having an NSTEMI last week with 2 stents placed.  His EKG today does not appear to show any acute changes but  it does show findings that would be consistent with his recent NSTEMI.  He is currently pain-free.  His troponin did come back elevated, but it is lower than when he was in the hospital.  Regardless, I think it is best that he come into the hospital for further evaluation and management.  I subsequently spoke with Dr. Garcia, who will be taking care of him.    Disposition   The patient was admitted to the hospital.     Diagnosis     ICD-10-CM    1. Chest pain, unspecified type  R07.9       2. Recent NSTEMI (non-ST elevated myocardial infarction) (H)  I21.4                MD Onelia Sarabia Seth H, MD  03/25/25 5076

## 2025-03-25 NOTE — ED TRIAGE NOTES
CP while at work; took 1 of his nitro SL; EMS gave 324 ASA; pain resolved before arrival to ED; pt had a stent placed 3/17; on [facility]     Triage Assessment (Adult)       Row Name 03/25/25 1428          Triage Assessment    Airway WDL WDL        Respiratory WDL    Respiratory WDL WDL        Skin Circulation/Temperature WDL    Skin Circulation/Temperature WDL WDL        Cardiac WDL    Cardiac WDL X;chest pain        Chest Pain Assessment    Chest Pain Location midsternal     Chest Pain Intervention cardiac biomarkers drawn;cardiac monitoring continued;12-lead ECG obtained;cardiac monitor placed;aspirin given;nitroglycerin SL given        [place] Coma Scale    Best Eye Response 4-->(E4) spontaneous     Best Motor Response 6-->(M6) obeys commands     Best Verbal Response 5-->(V5) oriented     [place] Coma Scale Score 15

## 2025-03-25 NOTE — Clinical Note
Max pressure = 19 atilio. Total duration = 44 seconds.     Max pressure = 21 atilio. Total duration = 45 seconds.    Balloon reinflated a second time: Max pressure = 21 atilio. Total duration = 45 seconds.  Balloon reinflated a third time:

## 2025-03-25 NOTE — PHARMACY-ADMISSION MEDICATION HISTORY
Pharmacist Admission Medication History    Admission medication history is complete. The information provided in this note is only as accurate as the sources available at the time of the update.    Information Source(s): Patient and CareEverywhere/SureScripts via in-person recent admission     Pertinent Information:     Changes made to PTA medication list:  Added: None  Deleted: None  Changed: None    Allergies reviewed with patient and updates made in EHR: no    Medication History Completed By: Roxie Constantino RPH 3/25/2025 5:44 PM    PTA Med List   Medication Sig Last Dose/Taking    aspirin (ASA) 81 MG chewable tablet Take 1 tablet (81 mg) by mouth daily. Starting tomorrow. (Patient taking differently: Take 81 mg by mouth every evening. Starting tomorrow.) 3/25/2025    atorvastatin (LIPITOR) 40 MG tablet Take 1 tablet (40 mg) by mouth daily. (Patient taking differently: Take 40 mg by mouth every evening.) 3/24/2025    lisinopril (ZESTRIL) 10 MG tablet TAKE ONE TABLET BY MOUTH ONCE DAILY (Patient taking differently: Take 10 mg by mouth every evening.) 3/24/2025    metoprolol succinate ER (TOPROL XL) 25 MG 24 hr tablet Take 1 tablet (25 mg) by mouth every evening. 3/24/2025    nitroGLYcerin (NITROSTAT) 0.4 MG sublingual tablet For chest pain place 1 tablet under the tongue every 5 minutes for 3 doses. If symptoms persist 5 minutes after 1st dose call 911. Taking    ticagrelor (BRILINTA) 90 MG tablet Take 1 tablet (90 mg) by mouth 2 times daily. Dose to start this evening. 3/25/2025     Roxie PosadaD

## 2025-03-25 NOTE — Clinical Note
Max pressure = 17 atilio. Total duration = 30 seconds.     Max pressure = 17 atilio. Total duration = 40 seconds.    Balloon reinflated a second time: Max pressure = 17 atilio. Total duration = 40 seconds. Home

## 2025-03-25 NOTE — ED NOTES
Bed: ED13  Expected date:   Expected time:   Means of arrival:   Comments:  524 64M resolved chest discomfort stents placed last week

## 2025-03-25 NOTE — Clinical Note
Max pressure = 7 atilio. Total duration = 45 seconds.     Max pressure = 10 atilio. Total duration = 40 seconds.    Balloon reinflated a second time: Max pressure = 10 atilio. Total duration = 40 seconds.  Balloon reinflated a third time: Max pressure = 12 atilio. Total duration = 45 seconds.  Balloon reinflated a fourth time: Max pressure = 12 atilio. Total duration = 30 seconds.

## 2025-03-26 ENCOUNTER — APPOINTMENT (OUTPATIENT)
Dept: CARDIOLOGY | Facility: CLINIC | Age: 65
End: 2025-03-26
Attending: INTERNAL MEDICINE
Payer: COMMERCIAL

## 2025-03-26 LAB
ALBUMIN SERPL BCG-MCNC: 4 G/DL (ref 3.5–5.2)
ALP SERPL-CCNC: 170 U/L (ref 40–150)
ALT SERPL W P-5'-P-CCNC: 35 U/L (ref 0–70)
ANION GAP SERPL CALCULATED.3IONS-SCNC: 13 MMOL/L (ref 7–15)
AST SERPL W P-5'-P-CCNC: 31 U/L (ref 0–45)
BILIRUB SERPL-MCNC: 1.2 MG/DL
BUN SERPL-MCNC: 27.8 MG/DL (ref 8–23)
CALCIUM SERPL-MCNC: 9.2 MG/DL (ref 8.8–10.4)
CHLORIDE SERPL-SCNC: 103 MMOL/L (ref 98–107)
CREAT SERPL-MCNC: 1.32 MG/DL (ref 0.67–1.17)
EGFRCR SERPLBLD CKD-EPI 2021: 61 ML/MIN/1.73M2
ERYTHROCYTE [DISTWIDTH] IN BLOOD BY AUTOMATED COUNT: 12.1 % (ref 10–15)
ERYTHROCYTE [DISTWIDTH] IN BLOOD BY AUTOMATED COUNT: 12.2 % (ref 10–15)
GLUCOSE SERPL-MCNC: 106 MG/DL (ref 70–99)
HCO3 SERPL-SCNC: 21 MMOL/L (ref 22–29)
HCT VFR BLD AUTO: 42.4 % (ref 40–53)
HCT VFR BLD AUTO: 43.4 % (ref 40–53)
HGB BLD-MCNC: 14.9 G/DL (ref 13.3–17.7)
HGB BLD-MCNC: 16 G/DL (ref 13.3–17.7)
MCH RBC QN AUTO: 31 PG (ref 26.5–33)
MCH RBC QN AUTO: 32.1 PG (ref 26.5–33)
MCHC RBC AUTO-ENTMCNC: 35.1 G/DL (ref 31.5–36.5)
MCHC RBC AUTO-ENTMCNC: 36.9 G/DL (ref 31.5–36.5)
MCV RBC AUTO: 87 FL (ref 78–100)
MCV RBC AUTO: 88 FL (ref 78–100)
PLATELET # BLD AUTO: 239 10E3/UL (ref 150–450)
PLATELET # BLD AUTO: 282 10E3/UL (ref 150–450)
POTASSIUM SERPL-SCNC: 4.4 MMOL/L (ref 3.4–5.3)
PROT SERPL-MCNC: 7.4 G/DL (ref 6.4–8.3)
RBC # BLD AUTO: 4.8 10E6/UL (ref 4.4–5.9)
RBC # BLD AUTO: 4.98 10E6/UL (ref 4.4–5.9)
SODIUM SERPL-SCNC: 137 MMOL/L (ref 135–145)
UFH PPP CHRO-ACNC: <0.1 IU/ML
WBC # BLD AUTO: 10 10E3/UL (ref 4–11)
WBC # BLD AUTO: 9.7 10E3/UL (ref 4–11)

## 2025-03-26 PROCEDURE — 96365 THER/PROPH/DIAG IV INF INIT: CPT

## 2025-03-26 PROCEDURE — G0378 HOSPITAL OBSERVATION PER HR: HCPCS

## 2025-03-26 PROCEDURE — 36415 COLL VENOUS BLD VENIPUNCTURE: CPT | Performed by: INTERNAL MEDICINE

## 2025-03-26 PROCEDURE — 250N000013 HC RX MED GY IP 250 OP 250 PS 637: Performed by: STUDENT IN AN ORGANIZED HEALTH CARE EDUCATION/TRAINING PROGRAM

## 2025-03-26 PROCEDURE — 99205 OFFICE O/P NEW HI 60 MIN: CPT | Mod: 25 | Performed by: INTERNAL MEDICINE

## 2025-03-26 PROCEDURE — 80051 ELECTROLYTE PANEL: CPT | Performed by: STUDENT IN AN ORGANIZED HEALTH CARE EDUCATION/TRAINING PROGRAM

## 2025-03-26 PROCEDURE — 250N000011 HC RX IP 250 OP 636: Performed by: INTERNAL MEDICINE

## 2025-03-26 PROCEDURE — 96366 THER/PROPH/DIAG IV INF ADDON: CPT

## 2025-03-26 PROCEDURE — 93321 DOPPLER ECHO F-UP/LMTD STD: CPT | Mod: 26 | Performed by: INTERNAL MEDICINE

## 2025-03-26 PROCEDURE — 210N000002 HC R&B HEART CARE

## 2025-03-26 PROCEDURE — 36415 COLL VENOUS BLD VENIPUNCTURE: CPT | Performed by: STUDENT IN AN ORGANIZED HEALTH CARE EDUCATION/TRAINING PROGRAM

## 2025-03-26 PROCEDURE — 93308 TTE F-UP OR LMTD: CPT | Mod: 26 | Performed by: INTERNAL MEDICINE

## 2025-03-26 PROCEDURE — 99223 1ST HOSP IP/OBS HIGH 75: CPT | Mod: 25 | Performed by: INTERNAL MEDICINE

## 2025-03-26 PROCEDURE — 93325 DOPPLER ECHO COLOR FLOW MAPG: CPT

## 2025-03-26 PROCEDURE — 85520 HEPARIN ASSAY: CPT | Performed by: EMERGENCY MEDICINE

## 2025-03-26 PROCEDURE — 36415 COLL VENOUS BLD VENIPUNCTURE: CPT | Performed by: EMERGENCY MEDICINE

## 2025-03-26 PROCEDURE — 93325 DOPPLER ECHO COLOR FLOW MAPG: CPT | Mod: 26 | Performed by: INTERNAL MEDICINE

## 2025-03-26 PROCEDURE — 85014 HEMATOCRIT: CPT | Performed by: STUDENT IN AN ORGANIZED HEALTH CARE EDUCATION/TRAINING PROGRAM

## 2025-03-26 PROCEDURE — 255N000002 HC RX 255 OP 636: Performed by: INTERNAL MEDICINE

## 2025-03-26 PROCEDURE — 85027 COMPLETE CBC AUTOMATED: CPT | Performed by: INTERNAL MEDICINE

## 2025-03-26 PROCEDURE — 82310 ASSAY OF CALCIUM: CPT | Performed by: STUDENT IN AN ORGANIZED HEALTH CARE EDUCATION/TRAINING PROGRAM

## 2025-03-26 RX ORDER — HEPARIN SODIUM 10000 [USP'U]/100ML
0-5000 INJECTION, SOLUTION INTRAVENOUS CONTINUOUS
Status: DISCONTINUED | OUTPATIENT
Start: 2025-03-26 | End: 2025-03-28

## 2025-03-26 RX ADMIN — TICAGRELOR 90 MG: 90 TABLET ORAL at 19:53

## 2025-03-26 RX ADMIN — ASPIRIN 81 MG: 81 TABLET, CHEWABLE ORAL at 08:16

## 2025-03-26 RX ADMIN — LISINOPRIL 10 MG: 10 TABLET ORAL at 19:53

## 2025-03-26 RX ADMIN — METOPROLOL SUCCINATE 25 MG: 25 TABLET, EXTENDED RELEASE ORAL at 19:53

## 2025-03-26 RX ADMIN — PERFLUTREN 10 ML: 6.52 INJECTION, SUSPENSION INTRAVENOUS at 11:18

## 2025-03-26 RX ADMIN — HEPARIN SODIUM 1100 UNITS/HR: 10000 INJECTION, SOLUTION INTRAVENOUS at 09:42

## 2025-03-26 RX ADMIN — ATORVASTATIN CALCIUM 40 MG: 40 TABLET, FILM COATED ORAL at 19:53

## 2025-03-26 RX ADMIN — TICAGRELOR 90 MG: 90 TABLET ORAL at 08:16

## 2025-03-26 ASSESSMENT — ACTIVITIES OF DAILY LIVING (ADL)
ADLS_ACUITY_SCORE: 42

## 2025-03-26 NOTE — ED NOTES
Pt denies chest discomfort at this time. Pt using urinal independently. Courtesy meal given. Pt aware of NPO status at midnight.

## 2025-03-26 NOTE — PROGRESS NOTES
Discussed case with Dr. Rodriguez and reviewed pictures with him.  Given RCA disease including a relatively large RV marginal, in the presence of symptoms, recommend coronary angiography tomorrow with intent to revascularize.  N.p.o. at midnight.  Continue IV heparin at this time.  Echocardiogram does not show wall motion normalities or pericardial effusion.  Normal EF.

## 2025-03-26 NOTE — PROGRESS NOTES
"2300 - 0700    A&Ox4, VSS, on RA, independent in the room, voiding adequately using a urinal, Cardiology consulted, NPO from midnightECHO this morning.     /86   Pulse 92   Temp 98.3  F (36.8  C) (Oral)   Resp 10   Ht 1.727 m (5' 8\")   Wt 91 kg (200 lb 9.9 oz)   SpO2 97%   BMI 30.50 kg/m      Quoc Martinez RN    "

## 2025-03-26 NOTE — CONSULTS
Cardiology Consultation     Date of Admission:  3/25/2025    Review of the result(s) of each unique test - Last ECG echocardiogram CBC BMP.     Assessment & Plan   Fredy Chow is a 64 year old male who was admitted on 3/25/2025.    Non-ST elevation myocardial infarction  Coronary artery disease with recent LAD stenting  Acute kidney injury    Recommendations  The patient symptoms were sudden onset and quite typical for a cardiac history.  He was diaphoretic and had left-sided chest discomfort that resolved with nitroglycerin.  Troponins are still mildly elevated but not unexpected given his recent ACS.  As mentioned, I have reviewed his coronary angiography and he does have moderate disease in his RCA and probably obstructive lesions in his smallish RPL RV marginal branches.  He had ANA yesterday but his creatinine is a little better now with hydration.    At this time he is symptom-free.  I recommend getting an echocardiogram limited to ensure no pericardial effusion and to see if there are any new wall motion abnormalities.  I am going to review his case with our interventionalist today to see if it is worth revisiting his coronary anatomy.  The patient has any more chest discomfort, I am inclined on proceeding with invasive coronary angiography tomorrow to assess his right coronary system.    We will follow.  In the interim recommend aspirin statin beta-blockers and IV heparin.  He is willing to proceed with coronary angiography if needed he says.  Risk and benefits explained.    He can eat today.  Recommend hydration.  Watch renal function and hopefully anticipate improvement tomorrow.    High complexity     RAGHU Garcia  Staff Cardiologist  [facility]    History of Present Illness   Fredy Chow is a 64 year old male who presents with chest discomfort.  This patient was recently in the hospital last week.  He was seen by one of my  partners.  Patient presented with non-ST elevation myocardial infarction.  EF was low normal.  He underwent coronary angiography last week and was noted to have occluded LAD that was stented.  Good results.  He was put on dual antiplatelet therapy and sent home.    I have reviewed his angiogram and echocardiogram personally.  He has mild basal inferior hypokinesis.  His ECG shows subtle inferior ST elevations which are present since last week and probably some evidence of inferior infarction nonpathologic looking Q waves.  Coronary angiography reveals moderate disease in the right coronary system and moderate to severe disease in the RPL and RV marginal branches but smallish vessels.    The patient presented to [facility] emergency department again with resting onset chest discomfort that started last evening.  This felt different than his previous presentation but did not feel like GERD he says.  Symptoms resolved with nitroglycerin.  He was sweaty and diaphoretic with that.  Came into the emergency department.  ECG did not show any new changes.  He has subtle inferior ST elevations but they are not changed from last week.  He is chest pain-free at this time.  His troponins are around 270 which is slightly lower than his last troponin levels last week.  No shortness of breath syncope presyncope.  Denies any bleeding and he is compliant with his dual antiplatelet therapy.    Past Medical History   Past Medical History:   Diagnosis Date    Anal condyloma     diagnosed at approx age 26    Genital herpes     Schwannoma     diagnosed at age 32, excision performed       Past Surgical History   Past Surgical History:   Procedure Laterality Date    BACK SURGERY  04/23/2002    CV CORONARY ANGIOGRAM N/A 3/17/2025    Procedure: Coronary Angiogram;  Surgeon: Juan Garrett MD;  Location:  [facility]    CV INTRAVASULAR ULTRASOUND N/A 3/17/2025    Procedure: Intravascular Ultrasound;  Surgeon: uJan Garrett  MD Wendy;  Location: [facility] CARDIAC CATH LAB    CV PCI N/A 3/17/2025    Procedure: Percutaneous Coronary Intervention;  Surgeon: Juan Garrett MD;  Location: [facility] CARDIAC CATH LAB       Prior to Admission Medications   Prior to Admission Medications   Prescriptions Last Dose Informant Patient Reported? Taking?   aspirin (ASA) 81 MG chewable tablet 3/25/2025 Self No Yes   Sig: Take 1 tablet (81 mg) by mouth daily. Starting tomorrow.   Patient taking differently: Take 81 mg by mouth every evening. Starting tomorrow.   atorvastatin (LIPITOR) 40 MG tablet 3/24/2025 Self No Yes   Sig: Take 1 tablet (40 mg) by mouth daily.   Patient taking differently: Take 40 mg by mouth every evening.   lisinopril (ZESTRIL) 10 MG tablet 3/24/2025 Self No Yes   Sig: TAKE ONE TABLET BY MOUTH ONCE DAILY   Patient taking differently: Take 10 mg by mouth every evening.   metoprolol succinate ER (TOPROL XL) 25 MG 24 hr tablet 3/24/2025 Self No Yes   Sig: Take 1 tablet (25 mg) by mouth every evening.   nitroGLYcerin (NITROSTAT) 0.4 MG sublingual tablet  Self No Yes   Sig: For chest pain place 1 tablet under the tongue every 5 minutes for 3 doses. If symptoms persist 5 minutes after 1st dose call 911.   ticagrelor (BRILINTA) 90 MG tablet 3/25/2025 Self No Yes   Sig: Take 1 tablet (90 mg) by mouth 2 times daily. Dose to start this evening.      Facility-Administered Medications: None     Current Facility-Administered Medications   Medication Dose Route Frequency Provider Last Rate Last Admin    aspirin (ASA) chewable tablet 81 mg  81 mg Oral Daily Juvenal Garcia MD   81 mg at 03/26/25 0816    atorvastatin (LIPITOR) tablet 40 mg  40 mg Oral QPM Juvenal Garcia MD   40 mg at 03/25/25 2104    lidocaine (LMX4) cream   Topical Q1H PRN Juvenal Garcia MD        lidocaine 1 % 0.1-1 mL  0.1-1 mL Other Q1H PRN Juvenal Garcia MD        lisinopril (ZESTRIL) tablet 10 mg  10 mg Oral QPM Juvenal Garcia MD   10 mg at 03/25/25 2107    metoprolol succinate ER  (TOPROL XL) 24 hr tablet 25 mg  25 mg Oral QPM Juvenal Garcia MD   25 mg at 03/25/25 2104    nitroGLYcerin (NITROSTAT) sublingual tablet 0.4 mg  0.4 mg Sublingual Q5 Min PRN Juvenal Garcia MD        ondansetron (ZOFRAN ODT) ODT tab 4 mg  4 mg Oral Q6H PRN Juvenal Garcia MD        Or    ondansetron (ZOFRAN) injection 4 mg  4 mg Intravenous Q6H PRN Juvenal Garcia MD        prochlorperazine (COMPAZINE) injection 10 mg  10 mg Intravenous Q6H PRN Juvenal Garcia MD        Or    prochlorperazine (COMPAZINE) tablet 10 mg  10 mg Oral Q6H PRN Juvenal Garcia MD        senna-docusate (SENOKOT-S/PERICOLACE) 8.6-50 MG per tablet 1 tablet  1 tablet Oral BID PRN Juvenal Garcia MD        Or    senna-docusate (SENOKOT-S/PERICOLACE) 8.6-50 MG per tablet 2 tablet  2 tablet Oral BID PRN Juvenal Garcia MD        sodium chloride (PF) 0.9% PF flush 3 mL  3 mL Intracatheter q1 min prn Juvenal Garcia MD        sodium chloride (PF) 0.9% PF flush 3 mL  3 mL Intracatheter Q8H Juvenal Garcia MD   3 mL at 03/26/25 0402    ticagrelor (BRILINTA) tablet 90 mg  90 mg Oral BID Juvenal Garcia MD   90 mg at 03/26/25 0816     Current Outpatient Medications   Medication Sig Dispense Refill    aspirin (ASA) 81 MG chewable tablet Take 1 tablet (81 mg) by mouth daily. Starting tomorrow. (Patient taking differently: Take 81 mg by mouth every evening. Starting tomorrow.) 30 tablet 3    atorvastatin (LIPITOR) 40 MG tablet Take 1 tablet (40 mg) by mouth daily. (Patient taking differently: Take 40 mg by mouth every evening.) 90 tablet 3    lisinopril (ZESTRIL) 10 MG tablet TAKE ONE TABLET BY MOUTH ONCE DAILY (Patient taking differently: Take 10 mg by mouth every evening.) 30 tablet 1    metoprolol succinate ER (TOPROL XL) 25 MG 24 hr tablet Take 1 tablet (25 mg) by mouth every evening. 60 tablet 0    nitroGLYcerin (NITROSTAT) 0.4 MG sublingual tablet For chest pain place 1 tablet under the tongue every 5 minutes for 3 doses. If symptoms persist 5 minutes after 1st dose call 216. 34  tablet 0    ticagrelor (BRILINTA) 90 MG tablet Take 1 tablet (90 mg) by mouth 2 times daily. Dose to start this evening. 180 tablet 3     Current Facility-Administered Medications   Medication Dose Route Frequency Provider Last Rate Last Admin    aspirin (ASA) chewable tablet 81 mg  81 mg Oral Daily Juvenal Garcia MD   81 mg at 03/26/25 0816    atorvastatin (LIPITOR) tablet 40 mg  40 mg Oral QPM Juvenal Garcia MD   40 mg at 03/25/25 2104    lidocaine (LMX4) cream   Topical Q1H PRN Juvenal Garcia MD        lidocaine 1 % 0.1-1 mL  0.1-1 mL Other Q1H PRN Juvenal Garcia MD        lisinopril (ZESTRIL) tablet 10 mg  10 mg Oral QPM Juvenal Garcia MD   10 mg at 03/25/25 2107    metoprolol succinate ER (TOPROL XL) 24 hr tablet 25 mg  25 mg Oral QPM Juvenal Garcia MD   25 mg at 03/25/25 2104    nitroGLYcerin (NITROSTAT) sublingual tablet 0.4 mg  0.4 mg Sublingual Q5 Min PRN Juvenal Garcia MD        ondansetron (ZOFRAN ODT) ODT tab 4 mg  4 mg Oral Q6H PRN Juvenal Garcia MD        Or    ondansetron (ZOFRAN) injection 4 mg  4 mg Intravenous Q6H PRN Juvenal Garcia MD        prochlorperazine (COMPAZINE) injection 10 mg  10 mg Intravenous Q6H PRN Juvenal Garcia MD        Or    prochlorperazine (COMPAZINE) tablet 10 mg  10 mg Oral Q6H PRN Juvenal Garcia MD        senna-docusate (SENOKOT-S/PERICOLACE) 8.6-50 MG per tablet 1 tablet  1 tablet Oral BID PRN Juvenal Garcia MD        Or    senna-docusate (SENOKOT-S/PERICOLACE) 8.6-50 MG per tablet 2 tablet  2 tablet Oral BID PRN Juvenal Garcia MD        sodium chloride (PF) 0.9% PF flush 3 mL  3 mL Intracatheter q1 min prn Juvenal Garcia MD        sodium chloride (PF) 0.9% PF flush 3 mL  3 mL Intracatheter Q8H Juvenal Garcia MD   3 mL at 03/26/25 0402    ticagrelor (BRILINTA) tablet 90 mg  90 mg Oral BID Juvenal Garcia MD   90 mg at 03/26/25 0816     Current Outpatient Medications   Medication Sig Dispense Refill    aspirin (ASA) 81 MG chewable tablet Take 1 tablet (81 mg) by mouth daily. Starting tomorrow. (Patient  taking differently: Take 81 mg by mouth every evening. Starting tomorrow.) 30 tablet 3    atorvastatin (LIPITOR) 40 MG tablet Take 1 tablet (40 mg) by mouth daily. (Patient taking differently: Take 40 mg by mouth every evening.) 90 tablet 3    lisinopril (ZESTRIL) 10 MG tablet TAKE ONE TABLET BY MOUTH ONCE DAILY (Patient taking differently: Take 10 mg by mouth every evening.) 30 tablet 1    metoprolol succinate ER (TOPROL XL) 25 MG 24 hr tablet Take 1 tablet (25 mg) by mouth every evening. 60 tablet 0    nitroGLYcerin (NITROSTAT) 0.4 MG sublingual tablet For chest pain place 1 tablet under the tongue every 5 minutes for 3 doses. If symptoms persist 5 minutes after 1st dose call 911. 30 tablet 0    ticagrelor (BRILINTA) 90 MG tablet Take 1 tablet (90 mg) by mouth 2 times daily. Dose to start this evening. 180 tablet 3     Allergies   No Known Allergies    Social History    reports that he quit smoking about 38 years ago. His smoking use included cigarettes. He does not have any smokeless tobacco history on file. He reports current alcohol use. He reports that he does not use drugs.    Family History   I have reviewed this patient's family history and updated it with pertinent information if needed.  Family History   Problem Relation Age of Onset    Hypertension Mother     Heart Failure Father     Hypertension Father     Ovarian Cancer Sister     Cerebrovascular Disease Maternal Grandmother     Diabetes Paternal Grandfather     Colon Cancer No family hx of           Review of Systems   A comprehensive review of system was performed and is negative other than that noted in the HPI or here.     Physical Exam   Vital Signs with Ranges  Temp:  [98.3  F (36.8  C)-98.8  F (37.1  C)] 98.3  F (36.8  C)  Pulse:  [] 92  Resp:  [5-18] 10  BP: (116-157)/() 116/86  SpO2:  [96 %-100 %] 97 %  Wt Readings from Last 4 Encounters:   03/25/25 91 kg (200 lb 9.9 oz)   03/18/25 93.5 kg (206 lb 3.2 oz)   01/31/25 98.9 kg (218  "lb)   06/06/17 90.3 kg (199 lb)     I/O last 3 completed shifts:  In: -   Out: 850 [Urine:850]      Vitals: /86   Pulse 92   Temp 98.3  F (36.8  C) (Oral)   Resp 10   Ht 1.727 m (5' 8\")   Wt 91 kg (200 lb 9.9 oz)   SpO2 97%   BMI 30.50 kg/m      Physical Exam:   General - Alert and in no acute distress  Respiratory -clear to auscultation nonlabored  Cardiovascular -normal regular heart sounds no murmur rubs or gallops no edema  Gastrointestinal - Non distended  Psych - Appropriate affect     No lab results found in last 7 days.    Invalid input(s): \"TROPONINIES\"    Recent Labs   Lab 03/26/25  0620 03/25/25  1425   WBC 10.0 10.5   HGB 14.9 15.1   MCV 88 89    262    137   POTASSIUM 4.4 4.9   CHLORIDE 103 102   CO2 21* 24   BUN 27.8* 25.7*   CR 1.32* 1.81*   GFRESTIMATED 61 41*   ANIONGAP 13 11   PATRIA 9.2 9.2   * 103*   ALBUMIN 4.0 4.3   PROTTOTAL 7.4 7.7   BILITOTAL 1.2 1.0   ALKPHOS 170* 178*   ALT 35 35   AST 31 28     No results for input(s): \"CHOL\", \"HDL\", \"LDL\", \"TRIG\", \"CHOLHDLRATIO\" in the last 96747 hours.  Recent Labs   Lab 03/26/25  0620 03/25/25  1425   WBC 10.0 10.5   HGB 14.9 15.1   HCT 42.4 43.7   MCV 88 89    262     No results for input(s): \"PH\", \"PHV\", \"PO2\", \"PO2V\", \"SAT\", \"PCO2\", \"PCO2V\", \"HCO3\", \"HCO3V\" in the last 168 hours.  Recent Labs   Lab 03/25/25  1425   NTBNPI 265     No results for input(s): \"DD\" in the last 168 hours.  No results for input(s): \"SED\", \"CRP\" in the last 168 hours.  Recent Labs   Lab 03/26/25  0620 03/25/25  1425    262     No results for input(s): \"TSH\" in the last 168 hours.  No results for input(s): \"COLOR\", \"APPEARANCE\", \"URINEGLC\", \"URINEBILI\", \"URINEKETONE\", \"SG\", \"UBLD\", \"URINEPH\", \"PROTEIN\", \"UROBILINOGEN\", \"NITRITE\", \"LEUKEST\", \"RBCU\", \"WBCU\" in the last 168 hours.    Imaging:  Recent Results (from the past 48 hours)   US Lower Extremity Venous Duplex Left    Narrative    EXAM: US LOWER EXTREMITY VENOUS DUPLEX " "LEFT  LOCATION: [facility]  DATE: 3/25/2025    INDICATION: Left leg pain and swelling.  COMPARISON: None.  TECHNIQUE: Venous Duplex ultrasound of the left lower extremity with and without compression, augmentation and duplex. Color flow and spectral Doppler with waveform analysis performed.    FINDINGS: Exam includes the common femoral, femoral, popliteal, and contralateral common femoral veins as well as segmentally visualized deep calf veins and greater saphenous vein.     LEFT: No deep vein thrombosis. No superficial thrombophlebitis. No popliteal cyst.      Impression    IMPRESSION:  No deep venous thrombosis in the left lower extremity.   XR Chest 2 Views    Narrative    EXAM: XR CHEST 2 VIEWS  LOCATION: [facility]  DATE: 3/25/2025    INDICATION: cp  COMPARISON: None.      Impression    IMPRESSION: Negative chest.       Echo:  No results found for this or any previous visit (from the past 4320 hours).      This note was completed in part using dictation via the Dragon voice recognition software. Some word and grammatical errors may occur and must be interpreted in the appropriate clinical context.  If there are any questions pertaining to this issue, please contact me for further clarification.    Clinically Significant Risk Factors Present on Admission                 # Drug Induced Platelet Defect: home medication list includes an antiplatelet medication   # Hypertension: Home medication list includes antihypertensive(s)           # Obesity: Estimated body mass index is 30.5 kg/m  as calculated from the following:    Height as of this encounter: 1.727 m (5' 8\").    Weight as of this encounter: 91 kg (200 lb 9.9 oz).             Native vessel CAD                Acute kidney failure, unspecified                        "

## 2025-03-26 NOTE — ED NOTES
Pt continues to rest comfortably. Updated on probably boarding status overnight in the ED. Lights turned down low. Denies any other needs at this time.

## 2025-03-26 NOTE — H&P
"[facility]    History and Physical - Hospitalist Service       Date of Admission:  3/25/2025    Assessment & Plan      Fredy Chow is a 64 year old male with a past medical history significant for HTN / CAD who presents with CP.     NSTEMI  Chest Pain  HTN  HLD  Assessment: recent admission for NSTEMI in 03/2025 and status post PCI of LAD with 2 drug-eluting stents. 50% lesion RCA to be managed medically. Normal EF and no WMA.  Now presenting with recurrent centralized chest discomfort.Troponin elevated to 270, but this is lower than when he was discharged.  Lower extremity venous Doppler was negative for DVTs.  Chest x-ray in ED showed no acute cardiopulmonary disease.  EKG shows sinus rhythm with nonspecific T wave changes but otherwise no dynamic ST changes to suggest ACS.  Symptom-free at this time and hemodynamically stable.  Plan:  -[place] to obs  -Continued Brilinta and aspirin  -Continue beta-blocker   -NTG as needed for CP  -Cardiology consulted  -Continue PTA lisinopril  -Telemetry  -Continue statin     CKD likely stage II  Baseline 1.3-1.4  -Outpatient follow-up with primary care          Diet:  NPO @ midnight  DVT Prophylaxis: DAPT  Gifford Catheter: Not present  Lines: None     Cardiac Monitoring: None  Code Status:  FULL CODE    Clinically Significant Risk Factors Present on Admission                 # Drug Induced Platelet Defect: home medication list includes an antiplatelet medication  # Acute Kidney Injury, unspecified: based on a >150% or 0.3 mg/dL increase in last creatinine compared to past 90 day average, will monitor renal function  # Hypertension: Home medication list includes antihypertensive(s)           # Obesity: Estimated body mass index is 30.5 kg/m  as calculated from the following:    Height as of this encounter: 1.727 m (5' 8\").    Weight as of this encounter: 91 kg (200 lb 9.9 oz).              Disposition Plan     Medically Ready for " Discharge: Anticipated Tomorrow           Juvenal Garcia MD  Hospitalist Service  [facility]  Securely message with [employer] (more info)  Text page via [facility] Paging/Directory     ______________________________________________________________________    Chief Complaint     Chest Pain    History is obtained from the patient    History of Present Illness   Fredy Chow is a 64 year old male with past medical history of coronary disease/NSTEMI for which she received 2 stents last week who presents for evaluation of recurrent chest pain.      Patient reports that he was doing well after his discharge when at lunch today he developed sudden onset of Chest pressure while at work.  There was no associated nausea or shortness of breath or diaphoresis.  He notes that current symptoms is different from symptoms of brought him to the ED last week.  He took 1 nitroglycerin at work and his chest pressure did eventually go away.  Otherwise she denies any recent fevers or chills since his discharge.  He has no cough.  Denies any nausea/vomiting or abdominal pain.  He did initially have some left arm discomfort  after his discharge that quickly resolved on its own.  Otherwise denies any bloody stools, weight loss or night sweats.  He has been compliant with his dual antiplatelet therapy.  Denies any hematuria, dysuria, urinary urgency or frequency.  He is otherwise   Chest pain-free at this time with no other complaints.      Past Medical History    Past Medical History:   Diagnosis Date    Anal condyloma     diagnosed at approx age 26    Genital herpes     Schwannoma     diagnosed at age 32, excision performed       Past Surgical History   Past Surgical History:   Procedure Laterality Date    BACK SURGERY  04/23/2002    CV CORONARY ANGIOGRAM N/A 3/17/2025    Procedure: Coronary Angiogram;  Surgeon: Juan Garrett MD;  Location:  HEART CARDIAC CATH LAB    CV INTRAVASULAR ULTRASOUND N/A 3/17/2025     Procedure: Intravascular Ultrasound;  Surgeon: Juan Garrett MD;  Location: [facility] CARDIAC CATH LAB    CV PCI N/A 3/17/2025    Procedure: Percutaneous Coronary Intervention;  Surgeon: Juan Garrett MD;  Location: [facility] CARDIAC CATH LAB       Prior to Admission Medications   Prior to Admission Medications   Prescriptions Last Dose Informant Patient Reported? Taking?   aspirin (ASA) 81 MG chewable tablet 3/25/2025 Self No Yes   Sig: Take 1 tablet (81 mg) by mouth daily. Starting tomorrow.   Patient taking differently: Take 81 mg by mouth every evening. Starting tomorrow.   atorvastatin (LIPITOR) 40 MG tablet 3/24/2025 Self No Yes   Sig: Take 1 tablet (40 mg) by mouth daily.   Patient taking differently: Take 40 mg by mouth every evening.   lisinopril (ZESTRIL) 10 MG tablet 3/24/2025 Self No Yes   Sig: TAKE ONE TABLET BY MOUTH ONCE DAILY   Patient taking differently: Take 10 mg by mouth every evening.   metoprolol succinate ER (TOPROL XL) 25 MG 24 hr tablet 3/24/2025 Self No Yes   Sig: Take 1 tablet (25 mg) by mouth every evening.   nitroGLYcerin (NITROSTAT) 0.4 MG sublingual tablet  Self No Yes   Sig: For chest pain place 1 tablet under the tongue every 5 minutes for 3 doses. If symptoms persist 5 minutes after 1st dose call 911.   ticagrelor (BRILINTA) 90 MG tablet 3/25/2025 Self No Yes   Sig: Take 1 tablet (90 mg) by mouth 2 times daily. Dose to start this evening.      Facility-Administered Medications: None        Review of Systems    The 10 point Review of Systems is negative other than noted in the HPI or here.     Social History   I have reviewed this patient's social history and updated it with pertinent information if needed.  Social History     Tobacco Use    Smoking status: Former     Current packs/day: 0.00     Types: Cigarettes     Quit date:      Years since quittin.2   Substance Use Topics    Alcohol use: Yes     Comment: wine - about 2 drinks per week    Drug use: No          Family History   I have reviewed this patient's family history and updated it with pertinent information if needed.  Family History   Problem Relation Age of Onset    Hypertension Mother     Heart Failure Father     Hypertension Father     Ovarian Cancer Sister     Cerebrovascular Disease Maternal Grandmother     Diabetes Paternal Grandfather     Colon Cancer No family hx of          Allergies   No Known Allergies  ------------------------------------------------------------------------     Physical Exam   Vital Signs: Temp: 98.8  F (37.1  C) Temp src: Oral BP: (!) 149/95 Pulse: 97   Resp: 14 SpO2: 98 % O2 Device: None (Room air)    Weight: 200 lbs 9.9 oz    Constitutional: awake, alert, cooperative, no apparent distress.   ENT: Normocephalic, without obvious abnormality, atraumatic  Respiratory: CTABL   Cardiovascular: RRR with no m/r/g   GI: Normal bowel sounds, soft, non-distended, non-tender.   Skin: normal skin color, texture, turgor   Musculoskeletal: There is no redness, warmth, or swelling of the joints. Full range of motion noted.   Neurologic: Awake, alert, oriented to name, place and time. Motor is 5 out of 5 bilaterally. Sensory is intact.   Neuropsychiatric: normal mood and affect    ----------------------------------------------------------------------------------------------------------------------------------    Medical Decision Making       55 MINUTES SPENT BY ME on the date of service doing chart review, history, exam, documentation & further activities per the note.      Data   ------------------------- PAST 24 HR DATA REVIEWED -----------------------------------------------    I have personally reviewed the following data over the past 24 hrs:    10.5  \   15.1   / 262     137 102 25.7 (H) /  103 (H)   4.9 24 1.81 (H) \     ALT: 35 AST: 28 AP: 178 (H) TBILI: 1.0   ALB: 4.3 TOT PROTEIN: 7.7 LIPASE: N/A     Trop: 266 (HH) BNP: 265       Imaging results reviewed over the past 24 hrs:    Recent Results (from the past 24 hours)   US Lower Extremity Venous Duplex Left    Narrative    EXAM: US LOWER EXTREMITY VENOUS DUPLEX LEFT  LOCATION: [facility]  DATE: 3/25/2025    INDICATION: Left leg pain and swelling.  COMPARISON: None.  TECHNIQUE: Venous Duplex ultrasound of the left lower extremity with and without compression, augmentation and duplex. Color flow and spectral Doppler with waveform analysis performed.    FINDINGS: Exam includes the common femoral, femoral, popliteal, and contralateral common femoral veins as well as segmentally visualized deep calf veins and greater saphenous vein.     LEFT: No deep vein thrombosis. No superficial thrombophlebitis. No popliteal cyst.      Impression    IMPRESSION:  No deep venous thrombosis in the left lower extremity.   XR Chest 2 Views    Narrative    EXAM: XR CHEST 2 VIEWS  LOCATION: [facility]  DATE: 3/25/2025    INDICATION: cp  COMPARISON: None.      Impression    IMPRESSION: Negative chest.     ------------------------- ENCOUNTER LABS ----------------------------------------------------------------  Recent Labs   Lab 03/25/25  1425   WBC 10.5   HGB 15.1   MCV 89         POTASSIUM 4.9   CHLORIDE 102   CO2 24   BUN 25.7*   CR 1.81*   ANIONGAP 11   PATRIA 9.2   *   ALBUMIN 4.3   PROTTOTAL 7.7   BILITOTAL 1.0   ALKPHOS 178*   ALT 35   AST 28       Most Recent 3 CBC's:  Recent Labs   Lab Test 03/25/25  1425 03/17/25  0604 03/16/25  1607   WBC 10.5 12.3* 12.9*   HGB 15.1 15.1 16.1   MCV 89 88 88    236 239     Most Recent 3 BMP's:  Recent Labs   Lab Test 03/25/25  1425 03/18/25  0633 03/17/25  0604    138 139   POTASSIUM 4.9 3.4 4.0   CHLORIDE 102 103 106   CO2 24 23 23   BUN 25.7* 14.5 16.5   CR 1.81* 1.29* 1.38*   ANIONGAP 11 12 10   PATRIA 9.2 9.1 9.0   * 114* 122*     Most Recent 2 LFT's:  Recent Labs   Lab Test 03/25/25  1425 03/17/25  0604   AST 28 103*   ALT 35 47    ALKPHOS 178* 137   BILITOTAL 1.0 0.9     Most Recent 3 INR's:No lab results found.  Most Recent 3 Troponin's:No lab results found.  Most Recent 3 BNP's:  Recent Labs   Lab Test 03/25/25  1425 03/16/25  1607   NTBNPI 265  --    NTBNP  --  372     Most Recent D-dimer:  Recent Labs   Lab Test 03/16/25  1607   DD 0.45     Most Recent Cholesterol Panel:No lab results found.  Most Recent 6 Bacteria Isolates From Any Culture (See EPIC Reports for Culture Details):No lab results found.  Most Recent TSH and T4:No lab results found.  Most Recent Urinalysis:No lab results found.  Most Recent ESR & CRP:No lab results found.

## 2025-03-26 NOTE — ED NOTES
Patient ambulated to the bathroom independently. Patient back to room and hooked up to cardiac monitor. Patient denies chest pain or SOB.

## 2025-03-27 LAB
ACT BLD: 137 SECONDS (ref 74–150)
ACT BLD: 243 SECONDS (ref 74–150)
ACT BLD: 300 SECONDS (ref 74–150)
ANION GAP SERPL CALCULATED.3IONS-SCNC: 12 MMOL/L (ref 7–15)
ATRIAL RATE - MUSE: 85 BPM
ATRIAL RATE - MUSE: 94 BPM
BUN SERPL-MCNC: 27.4 MG/DL (ref 8–23)
CALCIUM SERPL-MCNC: 9.1 MG/DL (ref 8.8–10.4)
CHLORIDE SERPL-SCNC: 104 MMOL/L (ref 98–107)
CREAT SERPL-MCNC: 1.41 MG/DL (ref 0.67–1.17)
DIASTOLIC BLOOD PRESSURE - MUSE: NORMAL MMHG
DIASTOLIC BLOOD PRESSURE - MUSE: NORMAL MMHG
EGFRCR SERPLBLD CKD-EPI 2021: 56 ML/MIN/1.73M2
GLUCOSE BLDC GLUCOMTR-MCNC: 117 MG/DL (ref 70–99)
GLUCOSE SERPL-MCNC: 116 MG/DL (ref 70–99)
HCO3 SERPL-SCNC: 21 MMOL/L (ref 22–29)
INTERPRETATION ECG - MUSE: NORMAL
INTERPRETATION ECG - MUSE: NORMAL
P AXIS - MUSE: 58 DEGREES
P AXIS - MUSE: 63 DEGREES
POTASSIUM SERPL-SCNC: 4.9 MMOL/L (ref 3.4–5.3)
PR INTERVAL - MUSE: 134 MS
PR INTERVAL - MUSE: 146 MS
QRS DURATION - MUSE: 74 MS
QRS DURATION - MUSE: 88 MS
QT - MUSE: 368 MS
QT - MUSE: 370 MS
QTC - MUSE: 440 MS
QTC - MUSE: 460 MS
R AXIS - MUSE: 47 DEGREES
R AXIS - MUSE: 58 DEGREES
SODIUM SERPL-SCNC: 137 MMOL/L (ref 135–145)
SYSTOLIC BLOOD PRESSURE - MUSE: NORMAL MMHG
SYSTOLIC BLOOD PRESSURE - MUSE: NORMAL MMHG
T AXIS - MUSE: 29 DEGREES
T AXIS - MUSE: 67 DEGREES
TROPONIN T SERPL HS-MCNC: 177 NG/L
UFH PPP CHRO-ACNC: 0.31 IU/ML
UFH PPP CHRO-ACNC: 0.82 IU/ML
VENTRICULAR RATE- MUSE: 85 BPM
VENTRICULAR RATE- MUSE: 94 BPM

## 2025-03-27 PROCEDURE — C1874 STENT, COATED/COV W/DEL SYS: HCPCS | Performed by: INTERNAL MEDICINE

## 2025-03-27 PROCEDURE — 99233 SBSQ HOSP IP/OBS HIGH 50: CPT | Performed by: HOSPITALIST

## 2025-03-27 PROCEDURE — 85347 COAGULATION TIME ACTIVATED: CPT

## 2025-03-27 PROCEDURE — 93454 CORONARY ARTERY ANGIO S&I: CPT | Performed by: INTERNAL MEDICINE

## 2025-03-27 PROCEDURE — 99418 PROLNG IP/OBS E/M EA 15 MIN: CPT | Performed by: HOSPITALIST

## 2025-03-27 PROCEDURE — 82962 GLUCOSE BLOOD TEST: CPT

## 2025-03-27 PROCEDURE — 250N000013 HC RX MED GY IP 250 OP 250 PS 637: Performed by: INTERNAL MEDICINE

## 2025-03-27 PROCEDURE — 36415 COLL VENOUS BLD VENIPUNCTURE: CPT

## 2025-03-27 PROCEDURE — 250N000009 HC RX 250: Performed by: INTERNAL MEDICINE

## 2025-03-27 PROCEDURE — 93005 ELECTROCARDIOGRAM TRACING: CPT

## 2025-03-27 PROCEDURE — 258N000003 HC RX IP 258 OP 636: Performed by: INTERNAL MEDICINE

## 2025-03-27 PROCEDURE — C1725 CATH, TRANSLUMIN NON-LASER: HCPCS | Performed by: INTERNAL MEDICINE

## 2025-03-27 PROCEDURE — 250N000011 HC RX IP 250 OP 636

## 2025-03-27 PROCEDURE — 99233 SBSQ HOSP IP/OBS HIGH 50: CPT | Mod: 25 | Performed by: NURSE PRACTITIONER

## 2025-03-27 PROCEDURE — 85520 HEPARIN ASSAY: CPT | Performed by: HOSPITALIST

## 2025-03-27 PROCEDURE — 93010 ELECTROCARDIOGRAM REPORT: CPT | Mod: XU | Performed by: INTERNAL MEDICINE

## 2025-03-27 PROCEDURE — 250N000011 HC RX IP 250 OP 636: Performed by: INTERNAL MEDICINE

## 2025-03-27 PROCEDURE — B2111ZZ FLUOROSCOPY OF MULTIPLE CORONARY ARTERIES USING LOW OSMOLAR CONTRAST: ICD-10-PCS | Performed by: INTERNAL MEDICINE

## 2025-03-27 PROCEDURE — 84484 ASSAY OF TROPONIN QUANT: CPT

## 2025-03-27 PROCEDURE — 99207 PR NO BILLABLE SERVICE THIS VISIT: CPT

## 2025-03-27 PROCEDURE — 99215 OFFICE O/P EST HI 40 MIN: CPT | Mod: 25 | Performed by: NURSE PRACTITIONER

## 2025-03-27 PROCEDURE — C1769 GUIDE WIRE: HCPCS | Performed by: INTERNAL MEDICINE

## 2025-03-27 PROCEDURE — 99153 MOD SED SAME PHYS/QHP EA: CPT | Performed by: INTERNAL MEDICINE

## 2025-03-27 PROCEDURE — 92928 PRQ TCAT PLMT NTRAC ST 1 LES: CPT | Mod: LD | Performed by: INTERNAL MEDICINE

## 2025-03-27 PROCEDURE — 36415 COLL VENOUS BLD VENIPUNCTURE: CPT | Performed by: HOSPITALIST

## 2025-03-27 PROCEDURE — C1760 CLOSURE DEV, VASC: HCPCS | Performed by: INTERNAL MEDICINE

## 2025-03-27 PROCEDURE — 93454 CORONARY ARTERY ANGIO S&I: CPT | Mod: 26 | Performed by: INTERNAL MEDICINE

## 2025-03-27 PROCEDURE — G0378 HOSPITAL OBSERVATION PER HR: HCPCS

## 2025-03-27 PROCEDURE — 210N000002 HC R&B HEART CARE

## 2025-03-27 PROCEDURE — 99152 MOD SED SAME PHYS/QHP 5/>YRS: CPT | Performed by: INTERNAL MEDICINE

## 2025-03-27 PROCEDURE — 272N000001 HC OR GENERAL SUPPLY STERILE: Performed by: INTERNAL MEDICINE

## 2025-03-27 PROCEDURE — 250N000013 HC RX MED GY IP 250 OP 250 PS 637: Performed by: STUDENT IN AN ORGANIZED HEALTH CARE EDUCATION/TRAINING PROGRAM

## 2025-03-27 PROCEDURE — 027035Z DILATION OF CORONARY ARTERY, ONE ARTERY WITH TWO DRUG-ELUTING INTRALUMINAL DEVICES, PERCUTANEOUS APPROACH: ICD-10-PCS | Performed by: INTERNAL MEDICINE

## 2025-03-27 PROCEDURE — C9600 PERC DRUG-EL COR STENT SING: HCPCS | Performed by: INTERNAL MEDICINE

## 2025-03-27 PROCEDURE — 82310 ASSAY OF CALCIUM: CPT

## 2025-03-27 PROCEDURE — C1887 CATHETER, GUIDING: HCPCS | Performed by: INTERNAL MEDICINE

## 2025-03-27 PROCEDURE — 80048 BASIC METABOLIC PNL TOTAL CA: CPT

## 2025-03-27 DEVICE — STENT COR ONYX FRONTIER 15X2.50MM ONYXNG25015UX: Type: IMPLANTABLE DEVICE | Status: FUNCTIONAL

## 2025-03-27 DEVICE — CLOSURE ANGIOSEAL 6FR 610130: Type: IMPLANTABLE DEVICE | Status: FUNCTIONAL

## 2025-03-27 RX ORDER — LORAZEPAM 0.5 MG/1
0.5 TABLET ORAL
Status: DISCONTINUED | OUTPATIENT
Start: 2025-03-27 | End: 2025-03-27 | Stop reason: HOSPADM

## 2025-03-27 RX ORDER — OXYCODONE HYDROCHLORIDE 5 MG/1
10 TABLET ORAL EVERY 4 HOURS PRN
Status: DISCONTINUED | OUTPATIENT
Start: 2025-03-27 | End: 2025-03-30 | Stop reason: HOSPADM

## 2025-03-27 RX ORDER — AMLODIPINE BESYLATE 2.5 MG/1
2.5 TABLET ORAL AT BEDTIME
Status: DISCONTINUED | OUTPATIENT
Start: 2025-03-27 | End: 2025-03-29

## 2025-03-27 RX ORDER — ATROPINE SULFATE 0.1 MG/ML
0.5 INJECTION INTRAVENOUS
Status: ACTIVE | OUTPATIENT
Start: 2025-03-27 | End: 2025-03-27

## 2025-03-27 RX ORDER — FENTANYL CITRATE 50 UG/ML
25 INJECTION, SOLUTION INTRAMUSCULAR; INTRAVENOUS
Status: DISCONTINUED | OUTPATIENT
Start: 2025-03-27 | End: 2025-03-30 | Stop reason: HOSPADM

## 2025-03-27 RX ORDER — POTASSIUM CHLORIDE 1500 MG/1
20 TABLET, EXTENDED RELEASE ORAL
Status: DISCONTINUED | OUTPATIENT
Start: 2025-03-27 | End: 2025-03-27 | Stop reason: HOSPADM

## 2025-03-27 RX ORDER — ONDANSETRON 4 MG/1
4 TABLET, ORALLY DISINTEGRATING ORAL EVERY 6 HOURS PRN
Status: DISCONTINUED | OUTPATIENT
Start: 2025-03-27 | End: 2025-03-30 | Stop reason: HOSPADM

## 2025-03-27 RX ORDER — ASPIRIN 81 MG/1
243 TABLET, CHEWABLE ORAL ONCE
Status: COMPLETED | OUTPATIENT
Start: 2025-03-27 | End: 2025-03-27

## 2025-03-27 RX ORDER — FLUMAZENIL 0.1 MG/ML
0.2 INJECTION, SOLUTION INTRAVENOUS
Status: ACTIVE | OUTPATIENT
Start: 2025-03-27 | End: 2025-03-27

## 2025-03-27 RX ORDER — HYDRALAZINE HYDROCHLORIDE 20 MG/ML
10 INJECTION INTRAMUSCULAR; INTRAVENOUS EVERY 4 HOURS PRN
Status: DISCONTINUED | OUTPATIENT
Start: 2025-03-27 | End: 2025-03-30 | Stop reason: HOSPADM

## 2025-03-27 RX ORDER — NITROGLYCERIN 5 MG/ML
VIAL (ML) INTRAVENOUS
Status: DISCONTINUED | OUTPATIENT
Start: 2025-03-27 | End: 2025-03-27 | Stop reason: HOSPADM

## 2025-03-27 RX ORDER — NALOXONE HYDROCHLORIDE 0.4 MG/ML
0.4 INJECTION, SOLUTION INTRAMUSCULAR; INTRAVENOUS; SUBCUTANEOUS
Status: ACTIVE | OUTPATIENT
Start: 2025-03-27 | End: 2025-03-27

## 2025-03-27 RX ORDER — SODIUM CHLORIDE 9 MG/ML
INJECTION, SOLUTION INTRAVENOUS CONTINUOUS
Status: ACTIVE | OUTPATIENT
Start: 2025-03-27 | End: 2025-03-27

## 2025-03-27 RX ORDER — METOPROLOL SUCCINATE 25 MG/1
25 TABLET, EXTENDED RELEASE ORAL 2 TIMES DAILY
Status: DISCONTINUED | OUTPATIENT
Start: 2025-03-27 | End: 2025-03-29

## 2025-03-27 RX ORDER — LORAZEPAM 2 MG/ML
0.5 INJECTION INTRAMUSCULAR
Status: DISCONTINUED | OUTPATIENT
Start: 2025-03-27 | End: 2025-03-27 | Stop reason: HOSPADM

## 2025-03-27 RX ORDER — IOPAMIDOL 755 MG/ML
INJECTION, SOLUTION INTRAVASCULAR
Status: DISCONTINUED | OUTPATIENT
Start: 2025-03-27 | End: 2025-03-27 | Stop reason: HOSPADM

## 2025-03-27 RX ORDER — SODIUM CHLORIDE 9 MG/ML
INJECTION, SOLUTION INTRAVENOUS CONTINUOUS
Status: DISCONTINUED | OUTPATIENT
Start: 2025-03-27 | End: 2025-03-27 | Stop reason: HOSPADM

## 2025-03-27 RX ORDER — HEPARIN SODIUM 1000 [USP'U]/ML
INJECTION, SOLUTION INTRAVENOUS; SUBCUTANEOUS
Status: DISCONTINUED | OUTPATIENT
Start: 2025-03-27 | End: 2025-03-27 | Stop reason: HOSPADM

## 2025-03-27 RX ORDER — OXYCODONE HYDROCHLORIDE 5 MG/1
5 TABLET ORAL EVERY 4 HOURS PRN
Status: DISCONTINUED | OUTPATIENT
Start: 2025-03-27 | End: 2025-03-30 | Stop reason: HOSPADM

## 2025-03-27 RX ORDER — ONDANSETRON 2 MG/ML
4 INJECTION INTRAMUSCULAR; INTRAVENOUS EVERY 6 HOURS PRN
Status: DISCONTINUED | OUTPATIENT
Start: 2025-03-27 | End: 2025-03-30 | Stop reason: HOSPADM

## 2025-03-27 RX ORDER — ISOSORBIDE MONONITRATE 30 MG/1
30 TABLET, EXTENDED RELEASE ORAL DAILY
Status: DISCONTINUED | OUTPATIENT
Start: 2025-03-28 | End: 2025-03-28

## 2025-03-27 RX ORDER — ASPIRIN 325 MG
325 TABLET ORAL ONCE
Status: COMPLETED | OUTPATIENT
Start: 2025-03-27 | End: 2025-03-27

## 2025-03-27 RX ORDER — METOPROLOL TARTRATE 1 MG/ML
5 INJECTION, SOLUTION INTRAVENOUS
Status: DISCONTINUED | OUTPATIENT
Start: 2025-03-27 | End: 2025-03-30 | Stop reason: HOSPADM

## 2025-03-27 RX ORDER — NALOXONE HYDROCHLORIDE 0.4 MG/ML
0.2 INJECTION, SOLUTION INTRAMUSCULAR; INTRAVENOUS; SUBCUTANEOUS
Status: ACTIVE | OUTPATIENT
Start: 2025-03-27 | End: 2025-03-27

## 2025-03-27 RX ORDER — NITROGLYCERIN 0.4 MG/1
0.4 TABLET SUBLINGUAL EVERY 5 MIN PRN
Status: DISCONTINUED | OUTPATIENT
Start: 2025-03-27 | End: 2025-03-30 | Stop reason: HOSPADM

## 2025-03-27 RX ORDER — HYDROMORPHONE HYDROCHLORIDE 1 MG/ML
0.3 INJECTION, SOLUTION INTRAMUSCULAR; INTRAVENOUS; SUBCUTANEOUS ONCE
Status: COMPLETED | OUTPATIENT
Start: 2025-03-27 | End: 2025-03-27

## 2025-03-27 RX ORDER — LIDOCAINE 40 MG/G
CREAM TOPICAL
Status: DISCONTINUED | OUTPATIENT
Start: 2025-03-27 | End: 2025-03-27

## 2025-03-27 RX ORDER — ASPIRIN 81 MG/1
81 TABLET ORAL DAILY
Status: DISCONTINUED | OUTPATIENT
Start: 2025-03-28 | End: 2025-03-30 | Stop reason: HOSPADM

## 2025-03-27 RX ORDER — FENTANYL CITRATE 50 UG/ML
INJECTION, SOLUTION INTRAMUSCULAR; INTRAVENOUS
Status: DISCONTINUED | OUTPATIENT
Start: 2025-03-27 | End: 2025-03-27 | Stop reason: HOSPADM

## 2025-03-27 RX ORDER — ACETAMINOPHEN 325 MG/1
650 TABLET ORAL EVERY 4 HOURS PRN
Status: DISCONTINUED | OUTPATIENT
Start: 2025-03-27 | End: 2025-03-30 | Stop reason: HOSPADM

## 2025-03-27 RX ADMIN — HEPARIN SODIUM 1400 UNITS/HR: 10000 INJECTION, SOLUTION INTRAVENOUS at 06:16

## 2025-03-27 RX ADMIN — SODIUM CHLORIDE: 0.9 INJECTION, SOLUTION INTRAVENOUS at 08:24

## 2025-03-27 RX ADMIN — ASPIRIN 325 MG ORAL TABLET 325 MG: 325 PILL ORAL at 08:24

## 2025-03-27 RX ADMIN — HYDROMORPHONE HYDROCHLORIDE 0.3 MG: 1 INJECTION, SOLUTION INTRAMUSCULAR; INTRAVENOUS; SUBCUTANEOUS at 07:36

## 2025-03-27 RX ADMIN — ATORVASTATIN CALCIUM 40 MG: 40 TABLET, FILM COATED ORAL at 21:05

## 2025-03-27 RX ADMIN — TICAGRELOR 90 MG: 90 TABLET ORAL at 21:05

## 2025-03-27 RX ADMIN — NITROGLYCERIN 0.4 MG: 0.4 TABLET SUBLINGUAL at 06:31

## 2025-03-27 RX ADMIN — TICAGRELOR 90 MG: 90 TABLET ORAL at 08:24

## 2025-03-27 RX ADMIN — AMLODIPINE BESYLATE 2.5 MG: 2.5 TABLET ORAL at 21:04

## 2025-03-27 RX ADMIN — METOPROLOL SUCCINATE 25 MG: 25 TABLET, EXTENDED RELEASE ORAL at 21:05

## 2025-03-27 RX ADMIN — NITROGLYCERIN 0.4 MG: 0.4 TABLET SUBLINGUAL at 06:38

## 2025-03-27 ASSESSMENT — ACTIVITIES OF DAILY LIVING (ADL)
ADLS_ACUITY_SCORE: 30
ADLS_ACUITY_SCORE: 19
ADLS_ACUITY_SCORE: 30
ADLS_ACUITY_SCORE: 30
ADLS_ACUITY_SCORE: 19
ADLS_ACUITY_SCORE: 30
ADLS_ACUITY_SCORE: 30
ADLS_ACUITY_SCORE: 19
ADLS_ACUITY_SCORE: 19
ADLS_ACUITY_SCORE: 30
ADLS_ACUITY_SCORE: 19
ADLS_ACUITY_SCORE: 19
ADLS_ACUITY_SCORE: 30
ADLS_ACUITY_SCORE: 30
ADLS_ACUITY_SCORE: 19
ADLS_ACUITY_SCORE: 30
ADLS_ACUITY_SCORE: 42

## 2025-03-27 NOTE — PROGRESS NOTES
RECEIVING UNIT ED HANDOFF REVIEW    ED Nurse Handoff Report was reviewed by: Von Cui RN on March 26, 2025 at 9:55 PM

## 2025-03-27 NOTE — PROGRESS NOTES
[facility]    Medicine Progress Note - Hospitalist Service    Date of Admission:  3/25/2025    Assessment & Plan     Fredy Chow is a 64 year old male with a past medical history significant for HTN / CAD who presents with CP.      NSTEMI  Chest Pain  HTN  HLD  Assessment: recent admission for NSTEMI in 03/2025 and status post PCI of LAD with 2 drug-eluting stents. 50% lesion RCA to be managed medically. Normal EF and no WMA.  Now presenting with recurrent centralized chest discomfort.Troponin elevated to 270, but this is lower than when he was discharged.  Lower extremity venous Doppler was negative for DVTs.  Chest x-ray in ED showed no acute cardiopulmonary disease.  EKG shows sinus rhythm with nonspecific T wave changes but otherwise no dynamic ST changes to suggest ACS.  Symptom-free at this time and hemodynamically stable.  Plan:  -Continued Brilinta and aspirin  -Continue beta-blocker   -NTG as needed for CP  -Cardiology consulted: input appreciated.  Patient to have angiogram done today.  -Continue heparin drip  -Telemetry  -Continue statin     CKD likely stage II  Baseline 1.3-1.4  -Outpatient follow-up with primary care     Observation Goals: -diagnostic tests and consults completed and resulted, -vital signs normal or at patient baseline, -tolerating oral intake to maintain hydration, -adequate pain control on oral analgesics, -returns to baseline functional status, -safe disposition plan has been identified, Nurse to notify provider when observation goals have been met and patient is ready for discharge.  Diet: NPO for Procedure/Surgery per Anesthesia Guidelines Except for: Meds; Clear liquids before procedure/surgery: ADULT (Age GREATER than or Equal to 18 years) - Clear liquids 2 hours before procedure/surgery    DVT Prophylaxis: Pneumatic Compression Devices  Gifford Catheter: Not present  Lines: None     Cardiac Monitoring: ACTIVE order. Indication: Chest pain/  "ACS rule out (24 hours)  Code Status: Full Code      Clinically Significant Risk Factors Present on Admission                 # Drug Induced Platelet Defect: home medication list includes an antiplatelet medication   # Hypertension: Home medication list includes antihypertensive(s)           # Obesity: Estimated body mass index is 30.85 kg/m  as calculated from the following:    Height as of this encounter: 1.727 m (5' 8\").    Weight as of this encounter: 92 kg (202 lb 14.4 oz).              Social Drivers of Health    Tobacco Use: Medium Risk (3/25/2025)    Patient History     Smoking Tobacco Use: Former     Smokeless Tobacco Use: Unknown          Disposition Plan     Medically Ready for Discharge: Anticipated in 2-4 Days             Sophia Hernandez MD  Hospitalist Service  [facility]  Securely message with [employer] (more info)  Text page via [employer] Paging/Directory   ______________________________________________________________________    Interval History   Patient was a rapid response earlier today for continued chest pain.  Responded to nitro.  Patient to have angiogram done today.    Physical Exam   Vital Signs: Temp: 98.1  F (36.7  C) Temp src: Oral BP: 102/77 Pulse: 94   Resp: 12 SpO2: 97 % O2 Device: Nasal cannula Oxygen Delivery: 2 LPM  Weight: 202 lbs 14.4 oz    General Appearance: Well appearing for stated age.  Respiratory: CTAB, no rales or ronchi  Cardiovascular: S1, S2 normal, no murmurs  GI: non-tender on palpation, BS present      Medical Decision Making       55 MINUTES SPENT BY ME on the date of service doing chart review, history, exam, documentation & further activities per the note.      Data     I have personally reviewed the following data over the past 24 hrs:    N/A  \   N/A   / N/A     137 104 27.4 (H) /  116 (H)   4.9 21 (L) 1.41 (H) \     Trop: 177 (HH) BNP: N/A       Imaging results reviewed over the past 24 hrs:   No results found for this or any previous visit " (from the past 24 hours).

## 2025-03-27 NOTE — PROGRESS NOTES
[facility] Progress Note  Date of Service: 03/27/2025    Primary Cardiologist: Dr. Calzada (hospital consultant)    Fredy Chow is a pleasant 64 year old male with past medical history of HTN, CKD, hyperlipidemia, recent NSTEMI 3/17/2025 s/p PCI DILAN x2 to mid-distal LAD; residual moderate disease in his RCA and possibly obstructive lesions in his smallish RPL RV marginal branches who was re-admitted on 3/25/2025 with NSTEMI. Presented with diaphoresis and chest pain relieved with nitroglycerin. HS troponin 270 (down from 337 on 3/17/25). Started on IV heparin. Echocardiogram showed stable normal LVEF and no effusion. Cath images from previous angiogram reviewed. Given RCA disease including a relatively large RV marginal, in the presence of symptoms, recommend coronary angiography with intent to revascularize.     Interim History: Chest pain this morning relieved with 2 nitroglycerin. 300cc bolus given for low blood pressure.     Subjective: Resting in bed. Currently chest pain free after nitroglycerin and dilaudid. No dizziness.     Has a friend as point of contact. Other family in [place]. Has lived [place]     Assessment:  NSTEMI/[place], recurrent chest pain  Recent NSTEMI 3/17/2025 s/p PCI DILAN x2 to mid-distal LAD on dual antiplatelet therapy. Given RCA disease including a relatively large RV marginal, in the presence of symptoms, recommend coronary angiography with intent to revascularize.   ANA - admit creatinine 1.8 - improved to 1.3. Given additional fluid bolus and pre-cath fluid treatment   HTN, controlled, low normal   Hyperlipidemia     Plan:   All risks and benefits of coronary angiogram with possible percutaneous intervention have been explained.  This includes but is not limited to bleeding, or potential for transfusion or risk of, clotting, heart attack, stroke, allergic reaction to x-ray dye or contrast nephropathy, or arrhythmia necessitating  cardioversion.  We discussed potential outcomes with intracoronary stenting or multivessel disease warranting surgical input.  No history of sedation reaction, bleeding problems or current bleeding, and no scheduled surgeries or known procedures in the next year. Patient understands and is agreeable to proceed.  Continue IV heparin on call to cath lab  Continue aspirin, brilinta, atorvastatin, metoprolol. Hold lisinopril for now.     Plan discussed with Dr. Bush and Emily jackson RN.    LETICIA Sullivan [facility]  Page via [email or web address]   __________________________________________________________________________  Physical Exam   Temp: 98.1  F (36.7  C) Temp src: Oral BP: 102/77 Pulse: 94   Resp: 12 SpO2: 97 % O2 Device: Nasal cannula Oxygen Delivery: 2 LPM  Vitals:    03/25/25 1423 03/26/25 2213   Weight: 91 kg (200 lb 9.9 oz) 92 kg (202 lb 14.4 oz)       GENERAL:  The patient is in no apparent distress.   PULMONARY:  There is a normal respiratory effort. Clear lungs to auscultation bilaterally.   CARDIOVASCULAR:  RRR, normal S1 S2, no m/r/g.  GI:  Non tender abdomen  EXTREMITIES:  No clubbing, cyanosis or edema.  VASCULAR: 2+ Pulses bilaterally in upper and lower extremities.    Medications   Current Facility-Administered Medications   Medication Dose Route Frequency Provider Last Rate Last Admin    heparin 25,000 units in 0.45% NaCl 250 mL ANTICOAGULANT infusion  0-5,000 Units/hr Intravenous Continuous Abdiel Bush MD 14 mL/hr at 03/27/25 0829 1,400 Units/hr at 03/27/25 0829    sodium chloride 0.9 % infusion   Intravenous Continuous Abdiel Bush  mL/hr at 03/27/25 0824 New Bag at 03/27/25 0824     Current Facility-Administered Medications   Medication Dose Route Frequency Provider Last Rate Last Admin    aspirin (ASA) chewable tablet 81 mg  81 mg Oral Daily Juvenal Garcia MD   81 mg at 03/26/25 0816    atorvastatin (LIPITOR) tablet 40 mg  40 mg Oral QPM Juvenal Garcia MD   40 mg  at 03/26/25 1953    lisinopril (ZESTRIL) tablet 10 mg  10 mg Oral QPM Juvenal Garcia MD   10 mg at 03/26/25 1953    metoprolol succinate ER (TOPROL XL) 24 hr tablet 25 mg  25 mg Oral QPM Juvenal Garcia MD   25 mg at 03/26/25 1953    sodium chloride (PF) 0.9% PF flush 3 mL  3 mL Intracatheter Q8H Abdiel Bush MD        sodium chloride (PF) 0.9% PF flush 3 mL  3 mL Intracatheter Q8H Juvenal Garcia MD   3 mL at 03/26/25 1954    ticagrelor (BRILINTA) tablet 90 mg  90 mg Oral BID Juvenal Garcia MD   90 mg at 03/27/25 0824       Data   Echocardiogram: 3/26/2025  Summary  Contrast was used without apparent complications. Normal transthoracic  echocardiogram. Compared to prior study, there is no significant change.    Echocardiogram: 3/18/2025  Summary  1. The left ventricle is normal in size. There is normal left ventricular wall  thickness. Left ventricular systolic function is normal. The visual ejection  fraction is 55-60%. Diastolic Doppler findings (E/E' ratio and/or other  parameters) suggest left ventricular filling pressures are indeterminate. No  regional wall motion abnormalities noted. There is no thrombus seen in the  left ventricle.  2. The right ventricle is normal size. The right ventricular systolic function  is normal.  3. Trace mitral and tricuspid regurgitation.  4. No pericardial effusion.    Most Recent 3 CBC's:  Recent Labs   Lab Test 03/26/25  1122 03/26/25  0620 03/25/25  1425   WBC 9.7 10.0 10.5   HGB 16.0 14.9 15.1   MCV 87 88 89    239 262     Most Recent 3 BMP's:  Recent Labs   Lab Test 03/27/25  0730 03/27/25  0717 03/26/25  0620 03/25/25  1425     --  137 137   POTASSIUM 4.9  --  4.4 4.9   CHLORIDE 104  --  103 102   CO2 21*  --  21* 24   BUN 27.4*  --  27.8* 25.7*   CR 1.41*  --  1.32* 1.81*   ANIONGAP 12  --  13 11   PATRIA 9.1  --  9.2 9.2   * 117* 106* 103*     Most Recent 2 LFT's:  Recent Labs   Lab Test 03/26/25  0620 03/25/25  1425   AST 31 28   ALT 35 35   ALKPHOS 170* 178*    BILITOTAL 1.2 1.0     Most Recent 3 BNP's:  Recent Labs   Lab Test 03/25/25  1425 03/16/25  1607   NTBNPI 265  --    NTBNP  --  372

## 2025-03-27 NOTE — PROGRESS NOTES
Date & Time: 3/26/25, [id number]  Admission Date: 3/25/2025   Admission Diagnosis: NSTEMI (non-ST elevated myocardial infarction) (H) [I21.4]  Chest pain, unspecified type [R07.9]  Hx: NSTEMI, LAD stents 3/17/25, HTN, HLD, CKD likely stage II    Orientation: Alert and Oriented x 4  VS/O2: Hypertensive (150s-160s SBP) but soft with administration of nitroglycerin SL. Can be tachycardic at times. RA during most of shift, now 2L NC for comfort s/p nitroglycerin administration.   ABNL Labs/Results: Trop 266, creat 1.32, heparin Xa checks  Pain: 0630: 3/10 CP reduced to 1/10 w/ SL nitroglycerin x2. See significant information    Tele/Cardiac: Sinus tachycardia, EF 55-60%  Respiratory: LS WDL; Breathing is comfortable at rest., No dyspnea on exertion.  Bladder: Continent, urinal at bedside  Bowel: Continent, last BM 3/26  Skin/Wounds/Incisions: L elbow, R fore arm and upper arm bruises, blanchable redness buttocks  IV/Lines/Drains: Heparin gtt @ 1400u/hr    Diet: NPO for Procedure/Surgery per Anesthesia Guidelines Except for: Meds; Clear liquids before procedure/surgery: ADULT (Age GREATER than or Equal to 18 years) - Clear liquids 2 hours before procedure/surgery    Activity Level: Independent    Plan: Angio this AM with intent to revascularize  Significant Information: Episode of midsternal and epigastric CP radiating down both arms at 0630, nitroglycerin SL x2 given, reduction in pain but new c/o numbness in arms and chest. Currently (0658) states sensation of numbness is reducing.

## 2025-03-27 NOTE — CODE/RAPID RESPONSE
"[facility]    RRT Note  3/27/2025   Time Called: 0711    Code Status: Full Code    I was called to evaluate Fredy Chow, who is a 64 year old male who was admitted on 3/25/2025 for NSTEMI in 03/2025 and status post PCI of LAD with 2 drug-eluting stents. PMH includes hypertension and CAD.    Assessment & Plan   Chest pain 2/2 NSTEMI  I was paged to come evaluate Mr. Figueroa as part of a rapid response call for acute chest pain. Mr. Figueroa was originally admitted on 3/25/25 with chest pain. Earlier in the month, he presented with an NSTEMI and had PCI of the LAD with 2 drug-eluting stents. He was also found to have a 50% lesion to the RCA, which was to be managed medically. Echocardiogram at the time showed a normal EF. He now presents with recurrent centralized chest discomfort. Initial troponin was elevated at 270. However, this was lower than it was when he discharged. Cardiology was consulted and he was placed on a heparin drip. His Brilinta and aspirin have been continued.  On 3/27/2025 at approximately 0630, Mr. Figueroa reported to the nurse that he was again having chest pain. The RN completed an EKG, which does not show any acute ischemic changes. He was also given 2 nitroglycerine tablets 5 minutes apart, with minimal relief, prompting the RN to call an RRT.  Upon my arrival, Mr. Figueroa was laying in the bed and did not appear to be in any acute distress. Initial blood pressure was 117/81. He is oxygenating well on 2 liters nasal canula. HR was in 80s with regular rhythm. He reports the severity of his chest pain as 1 out of 10. He describes it as \"aching,\" primarily located in the mid sternal region. It is not reproducible. He denies any shortness of breath, abdominal pain or nausea. EKG does not show any ischemic changes and troponin is lower than the one from 3/25/25. The bedside nurse notified cardiology. Plan is for coronary angiogram later this morning. " Finally, he was given 0.3 mg of IV dilaudid with near complete relief of his chest pain. I discussed all findings and plan with the attending Hospitalist.      INTERVENTIONS:  -EKG  -Troponin  -2 nitroglycerin were given prior to RRT  -0.3 mg IV dilaudid  -BMP  -RN updated cardiology  -Plan for coronary angiogram 3/27/25 AM    Discussed with and defer further cares to Dr. Hernandez    Upon completion of the rapid response encounter, the patient was hemodynamically stable with improved chest pain. Plan for him to remain in [facility]. No need to escalate level of care at this time.    Dima May NP  [facility]  Securely message with the Vocera Web Console (learn more here)  Text page via [employer] Paging/Directory      Physical Exam   Vital Signs with Ranges:  Temp:  [97.8  F (36.6  C)-98.2  F (36.8  C)] 98.1  F (36.7  C)  Pulse:  [] 94  Resp:  [12-21] 12  BP: ()/() 110/71  SpO2:  [95 %-99 %] 97 %  No intake/output data recorded.  Physical Exam  Vitals reviewed.   Constitutional:       General: He is not in acute distress.     Appearance: He is not toxic-appearing.   Cardiovascular:      Rate and Rhythm: Normal rate and regular rhythm.      Pulses: Normal pulses.      Heart sounds: Normal heart sounds.   Pulmonary:      Effort: Pulmonary effort is normal.      Breath sounds: Normal breath sounds and air entry.   Abdominal:      General: Abdomen is flat. Bowel sounds are normal.      Palpations: Abdomen is soft.      Tenderness: There is no abdominal tenderness.   Skin:     General: Skin is warm and dry.   Neurological:      General: No focal deficit present.      Mental Status: He is alert.   Psychiatric:         Attention and Perception: Attention normal.         Mood and Affect: Mood normal.         Speech: Speech normal.         Behavior: Behavior normal. Behavior is cooperative.         Thought Content: Thought content normal.         Cognition and Memory: Cognition normal.          Judgment: Judgment normal.     Data   IMAGING: (X-ray/CT/MRI)   Recent Results (from the past 24 hours)   Echocardiogram Limited    Narrative    [id number]  [id number]  [id number]  [id number]^COMPA^RICK     [facility]  Echocardiography Laboratory  [address] [place] [address]     Name: EDUARDO BENDER  MRN: [id number]  : 1960  Study Date: 2025 10:10 AM  Age: 64 yrs  Gender: Male  Patient Location: [facility]  Reason For Study: Chest Pain  Ordering Physician: RICK CHATMAN  Referring Physician: Caitlyn Clements  Performed By: Lawrence Joyce     BSA: 2.0 m2  Height: 68 in  Weight: 200 lb  HR: 101  BP: 126/88 mmHg  ______________________________________________________________________________  Procedure  Limited Echocardiogram with two-dimensional, color and spectral Doppler.  Definity (NDC #20169-782) given intravenously.  ______________________________________________________________________________  Interpretation Summary     Contrast was used without apparent complications. Normal transthoracic  echocardiogram. Compared to prior study, there is no significant change.  ______________________________________________________________________________  Left Ventricle  The left ventricle is normal in size. There is normal left ventricular wall  thickness. Left ventricular systolic function is normal. Normal left  ventricular wall motion. No evidence of left ventricular mass or tumors.     Right Ventricle  The right ventricle is normal in structure, function and size.     Atria  Normal left atrial size. Right atrial size is normal.     Mitral Valve  The mitral valve leaflets appear normal. There is no evidence of stenosis,  fluttering, or prolapse.     Tricuspid Valve  Normal tricuspid valve.     Aortic Valve  Normal tricuspid aortic valve.     Pulmonic Valve  Normal pulmonic valve.     Vessels  The aortic root is normal size. Normal size ascending aorta.     Pericardium  The  "pericardium appears normal.     ______________________________________________________________________________  MMode/2D Measurements & Calculations  IVSd: 1.0 cm  LVIDd: 4.3 cm  LVIDs: 2.9 cm  LVPWd: 1.2 cm  FS: 31.8 %  LV mass(C)d: 167.3 grams  LV mass(C)dI: 81.9 grams/m2     Ao root diam: 3.1 cm  asc Aorta Diam: 3.6 cm  LVOT diam: 2.2 cm  LVOT area: 3.9 cm2  RVOT diam: 1.8 cm  Ao root diam index Ht(cm/m): 1.8  Ao root diam index BSA (cm/m2): 1.5  Asc Ao diam index BSA (cm/m2): 1.7  Asc Ao diam index Ht(cm/m): 2.1  RWT: 0.58     ______________________________________________________________________________  Report approved by: Riky Biswas MD on 03/26/2025 11:53 AM             CBC with Diff:  Recent Labs   Lab Test 03/26/25  1122   WBC 9.7   HGB 16.0   MCV 87         No results found for: \"RETICABSCT\"  No results found for: \"RETP\"    Comprehensive Metabolic Panel:  Recent Labs   Lab 03/27/25  0730 03/27/25  0717 03/26/25  0620     --  137   POTASSIUM 4.9  --  4.4   CHLORIDE 104  --  103   CO2 21*  --  21*   ANIONGAP 12  --  13   *   < > 106*   BUN 27.4*  --  27.8*   CR 1.41*  --  1.32*   GFRESTIMATED 56*  --  61   PATRIA 9.1  --  9.2   PROTTOTAL  --   --  7.4   ALBUMIN  --   --  4.0   BILITOTAL  --   --  1.2   ALKPHOS  --   --  170*   AST  --   --  31   ALT  --   --  35    < > = values in this interval not displayed.         Time Spent on this Encounter   I spent 20 minutes on the unit/floor managing the care of Fredy Chow. Over 50% of my time was spent counseling the patient and/or coordinating care regarding services listed in this note.  "

## 2025-03-27 NOTE — PLAN OF CARE
Care plan note:      Recent Vitals:  Temp: 98.1  F (36.7  C) Temp src: Oral BP: (!) 154/99 Pulse: 98   Resp: 16 SpO2: 98 % O2 Device: None (Room air)      Orientation/Neuro: Alert and Oriented x 4  Pain: The patient is not having any pain.   Tele: Sinus rhythm    IV medications:  post cath IV fluids   Mobility:  pt on bedrest until 1900   Skin:  Rt wrist is ecchymotic, rt groin is c/d/I.   Resp: LS clear, on RA  GI: WDL  : frequency, while on bedrest this evening, will monitor once off bedrest.     Diet: Tolerating diet:   Well  Orders Placed This Encounter      Low Saturated Fat Na <2400 mg      Safety/Concerns:  None  Aggression Color: Green    Plan: Pt had stent to his 2nd diagonal today.  Added amlodipine, imdur and increased metoprolol.  Plan for cardiac rehab tomorrow.     Continue to monitor.      Emily Santacruz RN

## 2025-03-27 NOTE — PROGRESS NOTES
Admission/Transfer from: ED  2 RN skin assessment completed. Yes  Significant findings include: L elbow, R forearm bruises; buttocks/IT blanchable redness; L upper arm edema  WOC Nurse Consult Ordered? No

## 2025-03-28 ENCOUNTER — APPOINTMENT (OUTPATIENT)
Dept: PHYSICAL THERAPY | Facility: CLINIC | Age: 65
End: 2025-03-28
Attending: INTERNAL MEDICINE
Payer: COMMERCIAL

## 2025-03-28 VITALS
DIASTOLIC BLOOD PRESSURE: 68 MMHG | BODY MASS INDEX: 30.75 KG/M2 | SYSTOLIC BLOOD PRESSURE: 122 MMHG | HEART RATE: 89 BPM | TEMPERATURE: 97.6 F | HEIGHT: 68 IN | OXYGEN SATURATION: 96 % | WEIGHT: 202.9 LBS | RESPIRATION RATE: 18 BRPM

## 2025-03-28 LAB
ANION GAP SERPL CALCULATED.3IONS-SCNC: 9 MMOL/L (ref 7–15)
ATRIAL RATE - MUSE: 112 BPM
ATRIAL RATE - MUSE: 85 BPM
ATRIAL RATE - MUSE: 93 BPM
BASOPHILS # BLD AUTO: 0 10E3/UL (ref 0–0.2)
BASOPHILS NFR BLD AUTO: 0 %
BUN SERPL-MCNC: 23.5 MG/DL (ref 8–23)
CALCIUM SERPL-MCNC: 9.2 MG/DL (ref 8.8–10.4)
CHLORIDE SERPL-SCNC: 103 MMOL/L (ref 98–107)
CREAT SERPL-MCNC: 1.37 MG/DL (ref 0.67–1.17)
DIASTOLIC BLOOD PRESSURE - MUSE: NORMAL MMHG
EGFRCR SERPLBLD CKD-EPI 2021: 58 ML/MIN/1.73M2
EOSINOPHIL # BLD AUTO: 0.4 10E3/UL (ref 0–0.7)
EOSINOPHIL NFR BLD AUTO: 4 %
ERYTHROCYTE [DISTWIDTH] IN BLOOD BY AUTOMATED COUNT: 12.2 % (ref 10–15)
GLUCOSE SERPL-MCNC: 104 MG/DL (ref 70–99)
HCO3 SERPL-SCNC: 23 MMOL/L (ref 22–29)
HCT VFR BLD AUTO: 37.7 % (ref 40–53)
HGB BLD-MCNC: 13.1 G/DL (ref 13.3–17.7)
IMM GRANULOCYTES # BLD: 0.1 10E3/UL
IMM GRANULOCYTES NFR BLD: 1 %
INTERPRETATION ECG - MUSE: NORMAL
LYMPHOCYTES # BLD AUTO: 1.1 10E3/UL (ref 0.8–5.3)
LYMPHOCYTES NFR BLD AUTO: 11 %
MCH RBC QN AUTO: 31 PG (ref 26.5–33)
MCHC RBC AUTO-ENTMCNC: 34.7 G/DL (ref 31.5–36.5)
MCV RBC AUTO: 89 FL (ref 78–100)
MONOCYTES # BLD AUTO: 0.7 10E3/UL (ref 0–1.3)
MONOCYTES NFR BLD AUTO: 7 %
NEUTROPHILS # BLD AUTO: 7.8 10E3/UL (ref 1.6–8.3)
NEUTROPHILS NFR BLD AUTO: 77 %
NRBC # BLD AUTO: 0 10E3/UL
NRBC BLD AUTO-RTO: 0 /100
P AXIS - MUSE: 58 DEGREES
P AXIS - MUSE: 65 DEGREES
P AXIS - MUSE: 70 DEGREES
PLATELET # BLD AUTO: 222 10E3/UL (ref 150–450)
POTASSIUM SERPL-SCNC: 4.4 MMOL/L (ref 3.4–5.3)
PR INTERVAL - MUSE: 122 MS
PR INTERVAL - MUSE: 136 MS
PR INTERVAL - MUSE: 146 MS
QRS DURATION - MUSE: 74 MS
QRS DURATION - MUSE: 78 MS
QRS DURATION - MUSE: 84 MS
QT - MUSE: 346 MS
QT - MUSE: 370 MS
QT - MUSE: 372 MS
QTC - MUSE: 440 MS
QTC - MUSE: 462 MS
QTC - MUSE: 472 MS
R AXIS - MUSE: 55 DEGREES
R AXIS - MUSE: 58 DEGREES
R AXIS - MUSE: 61 DEGREES
RBC # BLD AUTO: 4.23 10E6/UL (ref 4.4–5.9)
SODIUM SERPL-SCNC: 135 MMOL/L (ref 135–145)
SYSTOLIC BLOOD PRESSURE - MUSE: NORMAL MMHG
T AXIS - MUSE: -1 DEGREES
T AXIS - MUSE: 11 DEGREES
T AXIS - MUSE: 29 DEGREES
VENTRICULAR RATE- MUSE: 112 BPM
VENTRICULAR RATE- MUSE: 85 BPM
VENTRICULAR RATE- MUSE: 93 BPM
WBC # BLD AUTO: 10.1 10E3/UL (ref 4–11)

## 2025-03-28 PROCEDURE — 250N000013 HC RX MED GY IP 250 OP 250 PS 637: Performed by: NURSE PRACTITIONER

## 2025-03-28 PROCEDURE — 97530 THERAPEUTIC ACTIVITIES: CPT | Mod: GP

## 2025-03-28 PROCEDURE — 85004 AUTOMATED DIFF WBC COUNT: CPT | Performed by: HOSPITALIST

## 2025-03-28 PROCEDURE — 99232 SBSQ HOSP IP/OBS MODERATE 35: CPT | Performed by: HOSPITALIST

## 2025-03-28 PROCEDURE — G0378 HOSPITAL OBSERVATION PER HR: HCPCS

## 2025-03-28 PROCEDURE — 36415 COLL VENOUS BLD VENIPUNCTURE: CPT | Performed by: INTERNAL MEDICINE

## 2025-03-28 PROCEDURE — 210N000002 HC R&B HEART CARE

## 2025-03-28 PROCEDURE — 97161 PT EVAL LOW COMPLEX 20 MIN: CPT | Mod: GP

## 2025-03-28 PROCEDURE — 93010 ELECTROCARDIOGRAM REPORT: CPT | Mod: XP | Performed by: INTERNAL MEDICINE

## 2025-03-28 PROCEDURE — 93005 ELECTROCARDIOGRAM TRACING: CPT

## 2025-03-28 PROCEDURE — 80048 BASIC METABOLIC PNL TOTAL CA: CPT | Performed by: INTERNAL MEDICINE

## 2025-03-28 PROCEDURE — 250N000013 HC RX MED GY IP 250 OP 250 PS 637: Performed by: STUDENT IN AN ORGANIZED HEALTH CARE EDUCATION/TRAINING PROGRAM

## 2025-03-28 PROCEDURE — 36415 COLL VENOUS BLD VENIPUNCTURE: CPT | Performed by: HOSPITALIST

## 2025-03-28 PROCEDURE — 250N000013 HC RX MED GY IP 250 OP 250 PS 637: Performed by: INTERNAL MEDICINE

## 2025-03-28 PROCEDURE — 97110 THERAPEUTIC EXERCISES: CPT | Mod: GP

## 2025-03-28 PROCEDURE — 99232 SBSQ HOSP IP/OBS MODERATE 35: CPT | Mod: FS | Performed by: NURSE PRACTITIONER

## 2025-03-28 RX ORDER — NALOXONE HYDROCHLORIDE 0.4 MG/ML
0.2 INJECTION, SOLUTION INTRAMUSCULAR; INTRAVENOUS; SUBCUTANEOUS
Status: DISCONTINUED | OUTPATIENT
Start: 2025-03-28 | End: 2025-03-30 | Stop reason: HOSPADM

## 2025-03-28 RX ORDER — NALOXONE HYDROCHLORIDE 0.4 MG/ML
0.4 INJECTION, SOLUTION INTRAMUSCULAR; INTRAVENOUS; SUBCUTANEOUS
Status: DISCONTINUED | OUTPATIENT
Start: 2025-03-28 | End: 2025-03-30 | Stop reason: HOSPADM

## 2025-03-28 RX ADMIN — AMLODIPINE BESYLATE 2.5 MG: 2.5 TABLET ORAL at 21:25

## 2025-03-28 RX ADMIN — METOPROLOL SUCCINATE 25 MG: 25 TABLET, EXTENDED RELEASE ORAL at 08:12

## 2025-03-28 RX ADMIN — ISOSORBIDE MONONITRATE 30 MG: 30 TABLET, EXTENDED RELEASE ORAL at 08:12

## 2025-03-28 RX ADMIN — METOPROLOL SUCCINATE 25 MG: 25 TABLET, EXTENDED RELEASE ORAL at 19:24

## 2025-03-28 RX ADMIN — TICAGRELOR 90 MG: 90 TABLET ORAL at 19:24

## 2025-03-28 RX ADMIN — TICAGRELOR 90 MG: 90 TABLET ORAL at 08:12

## 2025-03-28 RX ADMIN — ATORVASTATIN CALCIUM 40 MG: 40 TABLET, FILM COATED ORAL at 19:24

## 2025-03-28 RX ADMIN — ASPIRIN 81 MG: 81 TABLET, COATED ORAL at 08:12

## 2025-03-28 ASSESSMENT — ACTIVITIES OF DAILY LIVING (ADL)
ADLS_ACUITY_SCORE: 30
ADLS_ACUITY_SCORE: 29
ADLS_ACUITY_SCORE: 30

## 2025-03-28 NOTE — PROGRESS NOTES
Notified Person Name: MD Nena     Notification Date/Time: 1031 3/28     Purpose of Notification or Copy of Page: Patient status of class     Comments: Patient is in observation status, cards wants to keep patient one more night. Patient also has order for inpatient from 3/26.    Per hospitalist- patient should be inpatient and has an order for it, not sure why it is not showing correctly in chart

## 2025-03-28 NOTE — PROGRESS NOTES
03/28/25 1200   Appointment Info   Signing Clinician's Name / Credentials (PT) Nya Cunha DPT   Living Environment   People in Home friend(s)   Current Living Arrangements house   Home Accessibility stairs within home   Number of Stairs, Within Home, Primary ten   Stair Railings, Within Home, Primary railings safe and in good condition   Transportation Anticipated family or friend will provide;public transportation   Living Environment Comments Pt rents the basement of his friend's house. Reports his friend is retired and is home during the day   Self-Care   Usual Activity Tolerance good   Current Activity Tolerance good   Regular Exercise Yes   Activity/Exercise Type walking   Exercise Amount/Frequency daily   Equipment Currently Used at Home none   Fall history within last six months no   Activity/Exercise/Self-Care Comment Pt IND at baseline, works at [employer] at the [employer]. Pt reports he walks 15-20 minutes every day to the bus. Pt reports he has had one Outpatient CR appointment   General Information   Onset of Illness/Injury or Date of Surgery 03/25/25   Referring Physician Jayden Thomason MD   Patient/Family Therapy Goals Statement (PT) Return home   Pertinent History of Current Problem (include personal factors and/or comorbidities that impact the POC) Fredy Chow is a pleasant 64 year old male s/p PCI to LAD, with past medical history of HTN, CKD, hyperlipidemia, recent NSTEMI 3/17/2025 s/p PCI DILAN x2 to mid-distal LAD; residual moderate disease in his RCA and possibly obstructive lesions in his smallish RPL RV marginal branches who was re-admitted on 3/25/2025 with NSTEMI. Presented with diaphoresis and chest pain relieved with nitroglycerin.   Existing Precautions/Restrictions cardiac   Cognition   Affect/Mental Status (Cognition) WFL   Orientation Status (Cognition) oriented x 4   Follows Commands (Cognition) WFL   Pain Assessment   Patient Currently in Pain No    Integumentary/Edema   Integumentary/Edema Comments R wrist bruising, R groin angio site small bleeding   Posture    Posture Not impaired   Range of Motion (ROM)   Range of Motion ROM is WFL   Strength (Manual Muscle Testing)   Strength (Manual Muscle Testing) strength is WFL   Bed Mobility   Comment, (Bed Mobility) IND   Transfers   Comment, (Transfers) IND   Gait/Stairs (Locomotion)   Comment, (Gait/Stairs) Pt amb w/ no AD and IND   Balance   Balance no deficits were identified   Sensory Examination   Sensory Perception patient reports no sensory changes   Clinical Impression   Criteria for Skilled Therapeutic Intervention Yes, treatment indicated   PT Diagnosis (PT) Impaired activity tolerance   Influenced by the following impairments Decreased activity tolerance   Functional limitations due to impairments Limited functional mobility requiring assist   Clinical Presentation (PT Evaluation Complexity) stable   Clinical Presentation Rationale Based on PMH, current status, and social support   Clinical Decision Making (Complexity) low complexity   Planned Therapy Interventions (PT) balance training;bed mobility training;gait training;home exercise program;patient/family education;stair training;strengthening;transfer training;progressive activity/exercise;risk factor education   Risk & Benefits of therapy have been explained evaluation/treatment results reviewed;care plan/treatment goals reviewed;risks/benefits reviewed;current/potential barriers reviewed;participants voiced agreement with care plan;participants included;patient   PT Total Evaluation Time   PT Eval, Low Complexity Minutes (94438) 8   Physical Therapy Goals   PT Frequency Daily   PT Predicted Duration/Target Date for Goal Attainment 04/04/25   PT Goals Bed Mobility;Transfers;Cardiac Phase 1   PT: Bed Mobility Independent;Goal Met   PT: Transfers Independent;Goal Met   PT: Understanding of cardiac education to maximize quality of life, condition  management, and health outcomes Patient;Verbalize;Demonstrate   PT: Perform aerobic activity with stable cardiovascular response continuous;10 minutes;treadmill   PT: Functional/aerobic ambulation tolerance with stable cardiovascular response in order to return to home and community environment Independent;Greater than 300 feet;Goal Met   PT: Navigation of stairs simulating home set up with stable cardiovascular response in order to return to home and community environment Modified independent;Greater than 10 stairs;Goal Met   Therapeutic Procedure/Exercise   Ther. Procedure: strength, endurance, ROM, flexibillity Minutes (81942) 14   Symptoms Noted During/After Treatment none   Treatment Time Includes (CR Only) See specific exercise details intervention group(s);Monitoring of vital signs (see vital signs flowsheet for details)   Therapeutic Activity   Therapeutic Activities: dynamic activities to improve functional performance Minutes (50710) 11   Symptoms Noted During/After Treatment None   Treatment Detail/Skilled Intervention Pt in bed upon therapist arrival, agreeable to PT. In standing, pt donned shoes and robe IND. Pt bending down to  items on the ground, demos good stability. After amb, pt sitting EOB. Therapist provided CR handouts, educated pt on handouts. Pt sitting EOB at end of session, all needs w/in reach, RN updated.   Ambulation   Workload Flat surface, hallway   Duration (minutes) 9 minutes   Effort Scale 4   Symptoms Denies symptoms   Cardiovascular Response Normal   Exercise Details Pt amb w/ no AD and IND   Vital Signs Details Stable, see VSFS   Stairs   Workload Up/down steps w/ intermittent use of rail   Duration (minutes) 2 minutes   Effort Scale 3   Symptoms Denies symptoms   Cardiovascular Response Normal   Vital Signs Details Stable, see VSFS   Exercise Details Pt navigated 5 steps x4 reps IND. Reciprocol pattern.   Cardiac Education   Education Provided Emergency plan;Energy  conservation;Home exercise program;OMNI Scale;Outpatient Cardiac Rehab;Precautions;Risk factors;Signs and symptoms   Education Packet Given to Patient Yes   All Patient Education Handouts Reviewed with Patient and/or Family Yes   Cardiac Rehab Phase II Plan   Phase II Order Received Yes   Phase II Appointment Status Scheduled   Location [facility]   PT Discharge Planning   PT Plan Progress amb on treadmill, stairs, review education   PT Discharge Recommendation (DC Rec) home with outpatient cardiac rehab   PT Rationale for DC Rec Pt is independent with mobility and safe to discharge home. Pt will benefit from outpatient CR to further increase activity tolerance and for cardiac education.   PT Brief overview of current status IND. Goals of therapy will be to address safe mobility and make recs for d/c to next level of care. Pt and RN will continue to follow all falls risk precautions as documented by RN staff while hospitalized   Physical Therapy Time and Intention   Timed Code Treatment Minutes 25   Total Session Time (sum of timed and untimed services) 33

## 2025-03-28 NOTE — PLAN OF CARE
"Shift Note [id number]:     Reason for admission: Chest pain  Cognition/ Mentation: Alert and oriented x4.   Activity: independent  Vital Signs/Tele: VSS. Tele: NSR  Aggression Stop Light: green    Shift Summary: patient had episode of anxiety/ \"feeling weird\" per pt report. EKG obtained, VSS, denied chest pain, MD notified. Patient stated issue resolved. Pt also told writer he has some anxiety with some personal issues he has going on right now with getting back to work etc. Writer talked to cards and per cards if he needs a note to go back to work patient just needs to talk to cards office.     Patient currently \"observation status\", MD notified and patient should be inpatient status. MD working on it.     Therapy Recommendations: Cardiac Rehab safe to discharge home, patient would benefit from OP CR.  Discharge Plan: Home after cards clears patient    See Flow sheets for assessment      Observation Goals:   -diagnostic tests and consults completed and resulted-Met  -vital signs normal or at patient baseline- Not Met patient is tachycardic at times and cards wants to have patient stay one more night.   -tolerating oral intake to maintain hydration-Met  -adequate pain control on oral analgesics- Met  -returns to baseline functional status- Met, patient ambulating in hallways independently  -safe disposition plan has been identified-Met  "

## 2025-03-28 NOTE — PROGRESS NOTES
Notified Person Name: MD Nena     Notification Date/Time: 3/28 1100     Purpose of Notification or Copy of Page: Patient status     Comments: Patient stated feeling weird. No chest pain. Walking in halls (pt independent). EKG released. Any other orders?

## 2025-03-28 NOTE — PROGRESS NOTES
[facility]    Medicine Progress Note - Hospitalist Service    Date of Admission:  3/25/2025    Assessment & Plan     Fredy Chow is a 64 year old male with a past medical history significant for HTN / CAD who presents with CP.      NSTEMI  Chest Pain  HTN  HLD  Assessment: recent admission for NSTEMI in 03/2025 and status post PCI of LAD with 2 drug-eluting stents. 50% lesion RCA to be managed medically. Normal EF and no WMA.  Now presenting with recurrent centralized chest discomfort.Troponin elevated to 270, but this is lower than when he was discharged.  Lower extremity venous Doppler was negative for DVTs.  Chest x-ray in ED showed no acute cardiopulmonary disease.  EKG shows sinus rhythm with nonspecific T wave changes but otherwise no dynamic ST changes to suggest ACS.  Symptom-free at this time and hemodynamically stable.  Plan:  -Continued Brilinta and aspirin  -Continue beta-blocker   -NTG as needed for CP  -Cardiology consulted: input appreciated.  Status post angiogram with stent placement.  Has residual vessel disease  -Heparin drip discontinued  -Telemetry  -Continue statin  -Patient continues to have diaphoresis on 3/28/2025, per cards to monitor overnight       CKD likely stage II  Baseline 1.3-1.4  -Outpatient follow-up with primary care     Observation Goals: -diagnostic tests and consults completed and resulted, -vital signs normal or at patient baseline, -tolerating oral intake to maintain hydration, -adequate pain control on oral analgesics, -returns to baseline functional status, -safe disposition plan has been identified, Nurse to notify provider when observation goals have been met and patient is ready for discharge.  Diet: Low Saturated Fat Na <2400 mg    DVT Prophylaxis: Pneumatic Compression Devices  Gifofrd Catheter: Not present  Lines: None     Cardiac Monitoring: ACTIVE order. Indication: Post- PCI/Angiogram (24 hours)  Code Status: Full Code   "    Clinically Significant Risk Factors Present on Admission                 # Drug Induced Platelet Defect: home medication list includes an antiplatelet medication   # Hypertension: Home medication list includes antihypertensive(s)           # Obesity: Estimated body mass index is 30.65 kg/m  as calculated from the following:    Height as of this encounter: 1.727 m (5' 8\").    Weight as of this encounter: 91.4 kg (201 lb 9.6 oz).              Social Drivers of Health    Tobacco Use: Medium Risk (3/25/2025)    Patient History     Smoking Tobacco Use: Former     Smokeless Tobacco Use: Unknown          Disposition Plan     Medically Ready for Discharge: Anticipated in 2-4 Days             Sophia Hernandez MD  Hospitalist Service  [facility]  Securely message with [employer] (more info)  Text page via [employer] Paging/Directory   ______________________________________________________________________    Interval History   Patient denies any chest pain.  He notes diaphoresis early on today but attributed this to anxiety.  Will hold discharge today and monitor overnight per cards recommendations.    Physical Exam   Vital Signs: Temp: 97.6  F (36.4  C) Temp src: Oral BP: 126/89 Pulse: 111   Resp: 16 SpO2: 96 % O2 Device: None (Room air)    Weight: 201 lbs 9.6 oz    General Appearance: Well appearing for stated age.  Respiratory: CTAB, no rales or ronchi  Cardiovascular: S1, S2 normal, no murmurs  GI: non-tender on palpation, BS present      Medical Decision Making       55 MINUTES SPENT BY ME on the date of service doing chart review, history, exam, documentation & further activities per the note.      Data     I have personally reviewed the following data over the past 24 hrs:    N/A  \   N/A   / N/A     135 103 23.5 (H) /  104 (H)   4.4 23 1.37 (H) \       Imaging results reviewed over the past 24 hrs:   No results found for this or any previous visit (from the past 24 hours).  "

## 2025-03-28 NOTE — PROGRESS NOTES
[facility] Progress Note  Date of Service: 03/28/2025    Primary Cardiologist: Dr. Calzada (hospital consultant)    Fredy Chow is a pleasant 64 year old male with past medical history of HTN, CKD, hyperlipidemia, recent NSTEMI 3/17/2025 s/p PCI DILAN x2 to mid-distal LAD; residual moderate disease in his RCA and possibly obstructive lesions in his smallish RPL RV marginal branches who was re-admitted on 3/25/2025 with NSTEMI. Presented with diaphoresis and chest pain relieved with nitroglycerin. HS troponin 270 (down from 337 on 3/17/25). Started on IV heparin. Echocardiogram showed stable normal LVEF and no effusion.      Underwent coronary angiogram, previous mid LAD DILAN patent with no restenosis s/p PCI to 2nd mid diag (80%). RV branch (90%) not intervened due to smaller system and renal insuffiencey/dye load.     Subjective:  Resting in bed. Has gone for a couple walks today. No chest pain.  While sitting in bed earlier had episode of feeling sweaty and reported to the nurse he felt funny. No chest pain. HR a little higher.          Assessment:  NSTEMI/USA   Recent NSTEMI 3/17/2025 s/p PCI DILAN x2 to mid-distal LAD on dual antiplatelet therapy. Repeat coronary angiogram yesterday showed  previous mid LAD DILAN patent with no restenosis s/p PCI to 2nd mid diag (80%). RV branch (90%) not intervened due to smaller system and renal insuffiencey/dye load. Antianginal added with amlodipine and increased dose of metoprolol. Walked with cardiac rehab today doing well. Had episode of diaphoresis at rest this afternoon. Note inferolateral TWI on ECG. No chest pain. Feeling well after with walk. Discussed with Dr. Bush. In the absence of chest pain recommend inpatient monitoring another night.  ANA - admit creatinine 1.8 - improved to 1.3. Given additional fluid bolus and pre-cath fluid treatment. Lisinopril on hold.   HTN, controlled, low normal   Hyperlipidemia     Plan:   Recommend  monitoring inpatient another night given episode of feeling sweaty.   Continue amlodipine and increased dose of metoprolol for anginal relief. Can hold on Imdur for now but can add as needed in future   Continue aspirin, brilinta, atorvastatin   Hold lisinopril given ANA   Outpatient cardiac rehab  Has close follow up in cardiology clinic 4/7/25 as scheduled     Plan discussed with Dr. Bush and bedside RN.    LETICIA Sullivan [facility]  Page via Vocera   __________________________________________________________________________  Physical Exam   Temp: 97.6  F (36.4  C) Temp src: Oral BP: 126/89 Pulse: 111   Resp: 16 SpO2: 96 % O2 Device: None (Room air)    Vitals:    03/25/25 1423 03/26/25 2213 03/28/25 0700   Weight: 91 kg (200 lb 9.9 oz) 92 kg (202 lb 14.4 oz) 91.4 kg (201 lb 9.6 oz)       GENERAL:  The patient is in no apparent distress.   PULMONARY:  There is a normal respiratory effort. Clear lungs to auscultation bilaterally.   CARDIOVASCULAR:  RRR, normal S1 S2, no m/r/g.  GI:  Non tender abdomen  EXTREMITIES:  No clubbing, cyanosis or edema. Right radial ecchymosis. Right groin small ecchymosis outlined, soft. DP 2+  VASCULAR: 2+ Pulses bilaterally in upper and lower extremities.    Medications   Current Facility-Administered Medications   Medication Dose Route Frequency Provider Last Rate Last Admin    Continuing antiplatelet from home medication list OR antiplatelet order already placed during this visit   Does not apply DOES NOT GO TO Jayden López MD        Continuing beta blocker from home medication list OR beta blocker order already placed during this visit   Does not apply DOES NOT GO TO Jayden López MD        heparin 25,000 units in 0.45% NaCl 250 mL ANTICOAGULANT infusion  0-5,000 Units/hr Intravenous Continuous Abdiel Bush MD   Stopped at 03/27/25 1312    Percutaneous Coronary Intervention orders placed (this is information for BPA  alerting)   Does not apply DOES NOT GO TO Jayden López MD        Reason statin not prescribed   Other DOES NOT GO TO Jayden López MD         Current Facility-Administered Medications   Medication Dose Route Frequency Provider Last Rate Last Admin    amLODIPine (NORVASC) tablet 2.5 mg  2.5 mg Oral At Bedtime Jayden Thomason MD   2.5 mg at 03/27/25 2104    aspirin EC tablet 81 mg  81 mg Oral Daily Jayden Thomason MD   81 mg at 03/28/25 0812    atorvastatin (LIPITOR) tablet 40 mg  40 mg Oral QPM Juvenal Garcia MD   40 mg at 03/27/25 2105    isosorbide mononitrate (IMDUR) 24 hr tablet 30 mg  30 mg Oral Daily Michelle Max APRN CNP   30 mg at 03/28/25 0812    [Held by provider] lisinopril (ZESTRIL) tablet 10 mg  10 mg Oral QPM Juvenal Garcia MD   10 mg at 03/26/25 1953    metoprolol succinate ER (TOPROL XL) 24 hr tablet 25 mg  25 mg Oral BID Jayden Thomason MD   25 mg at 03/28/25 0812    sodium chloride (PF) 0.9% PF flush 3 mL  3 mL Intracatheter Q8H Juvenal Garcia MD   3 mL at 03/28/25 0814    ticagrelor (BRILINTA) tablet 90 mg  90 mg Oral BID Juvenal Garcia MD   90 mg at 03/28/25 0812       Data   Angiogram: 3/27/2025    Mid Cx lesion is 35% stenosed.    Prox LAD lesion is 40% stenosed.    2nd Diag lesion is 80% stenosed.    Mid RCA lesion is 35% stenosed.    RV Branch lesion is 90% stenosed.    Prox RCA to Mid RCA lesion is 35% stenosed.    Pt with chest pain  10 days post Anterior MI and  s/p stenting mid LAD.  Unable to localize source of chest pain (possible Dressler's, Diag, RV branch) Pt now with acute on chronic renal insufficiency which limits dye load.  I discussed the case with Dr Colón, initial suggestion was ongoing medical Rx but pt has had 2 episodes of chest pain within last 48 hours including an RRT event this AM.  Given that the diagonal may cover a larger territory elected to repair Diag today.  This diag exits the Lad through the LAD stent, there is mild/mod ostial  "Diag disease and then severe disease in mid Diag.  Given limited dye load option I ballooned (NC and cutting balloon) the mid Diag and tried to avoid repairing the ostial diagonal since it had a \"V\" take off not a \"T\" take off which might have required converting to a bifurcation stenting of Lad/D-this increasing chance of restenosis and increased dye load.  Successfully stented mid Diag taking vessel 80-85% down to 0% mid diagonal and the ostial diagonal has moderate disease.  Patient will need to be watched for recurrent angina, in interim I have increased Toprol and added amlodipine for anti-anginal and anti-HTN effect.  Watch for Lad/D restenosis or progression of ostial diag lesion.  The RV branch could be intervened on if necessary but it is a smaller system   (not done today due to renal insufficiency and dye load)       Echocardiogram: 3/26/2025  Summary  Contrast was used without apparent complications. Normal transthoracic  echocardiogram. Compared to prior study, there is no significant change.    Echocardiogram: 3/18/2025  Summary  1. The left ventricle is normal in size. There is normal left ventricular wall  thickness. Left ventricular systolic function is normal. The visual ejection  fraction is 55-60%. Diastolic Doppler findings (E/E' ratio and/or other  parameters) suggest left ventricular filling pressures are indeterminate. No  regional wall motion abnormalities noted. There is no thrombus seen in the  left ventricle.  2. The right ventricle is normal size. The right ventricular systolic function  is normal.  3. Trace mitral and tricuspid regurgitation.  4. No pericardial effusion.    Most Recent 3 CBC's:  Recent Labs   Lab Test 03/26/25  1122 03/26/25  0620 03/25/25  1425   WBC 9.7 10.0 10.5   HGB 16.0 14.9 15.1   MCV 87 88 89    239 262     Most Recent 3 BMP's:  Recent Labs   Lab Test 03/28/25  0609 03/27/25  0730 03/27/25  0717 03/26/25  0620    137  --  137   POTASSIUM 4.4 4.9  -- "  4.4   CHLORIDE 103 104  --  103   CO2 23 21*  --  21*   BUN 23.5* 27.4*  --  27.8*   CR 1.37* 1.41*  --  1.32*   ANIONGAP 9 12  --  13   PATRIA 9.2 9.1  --  9.2   * 116* 117* 106*     Most Recent 2 LFT's:  Recent Labs   Lab Test 03/26/25  0620 03/25/25  1425   AST 31 28   ALT 35 35   ALKPHOS 170* 178*   BILITOTAL 1.2 1.0     Most Recent 3 BNP's:  Recent Labs   Lab Test 03/25/25  1425 03/16/25  1607   NTBNPI 265  --    NTBNP  --  372

## 2025-03-28 NOTE — PROGRESS NOTES
Orientations: alert and oriented.   Vitals/Pain: vitals stable on RA. Denies pain.  Resp: clear, dim in bases.    Tele: SR with inverted T-wave  Diet: Low Saturated Fat Na <2400 mg   Lines/Drains: L PIV, SL  Skin/Wounds: R wrist bruising,R groin angio site,small bleeding, marked.   GI/: active BS, no BM passing flatus. Adequately voiding urinal/bathroom.  Labs: Abnormal/Trends, Electrolyte Replacement-n/a    Ambulation/Assist: ind in the room.  Sleep Quality: sleeping b/n cares.   Plan: cardiac rehab. Possible discharge to home.

## 2025-03-29 ENCOUNTER — APPOINTMENT (OUTPATIENT)
Dept: PHYSICAL THERAPY | Facility: CLINIC | Age: 65
End: 2025-03-29
Payer: COMMERCIAL

## 2025-03-29 LAB
ANION GAP SERPL CALCULATED.3IONS-SCNC: 13 MMOL/L (ref 7–15)
BUN SERPL-MCNC: 23.6 MG/DL (ref 8–23)
CALCIUM SERPL-MCNC: 9.6 MG/DL (ref 8.8–10.4)
CHLORIDE SERPL-SCNC: 100 MMOL/L (ref 98–107)
CREAT SERPL-MCNC: 1.34 MG/DL (ref 0.67–1.17)
EGFRCR SERPLBLD CKD-EPI 2021: 60 ML/MIN/1.73M2
GLUCOSE SERPL-MCNC: 168 MG/DL (ref 70–99)
HCO3 SERPL-SCNC: 23 MMOL/L (ref 22–29)
POTASSIUM SERPL-SCNC: 4.2 MMOL/L (ref 3.4–5.3)
SODIUM SERPL-SCNC: 136 MMOL/L (ref 135–145)

## 2025-03-29 PROCEDURE — 210N000002 HC R&B HEART CARE

## 2025-03-29 PROCEDURE — 99232 SBSQ HOSP IP/OBS MODERATE 35: CPT | Performed by: HOSPITALIST

## 2025-03-29 PROCEDURE — 97110 THERAPEUTIC EXERCISES: CPT | Mod: GP | Performed by: PHYSICAL THERAPIST

## 2025-03-29 PROCEDURE — 36415 COLL VENOUS BLD VENIPUNCTURE: CPT | Performed by: HOSPITALIST

## 2025-03-29 PROCEDURE — 97530 THERAPEUTIC ACTIVITIES: CPT | Mod: GP | Performed by: PHYSICAL THERAPIST

## 2025-03-29 PROCEDURE — 250N000013 HC RX MED GY IP 250 OP 250 PS 637: Performed by: INTERNAL MEDICINE

## 2025-03-29 PROCEDURE — 80048 BASIC METABOLIC PNL TOTAL CA: CPT | Performed by: HOSPITALIST

## 2025-03-29 PROCEDURE — 82310 ASSAY OF CALCIUM: CPT | Performed by: HOSPITALIST

## 2025-03-29 PROCEDURE — 99231 SBSQ HOSP IP/OBS SF/LOW 25: CPT | Performed by: INTERNAL MEDICINE

## 2025-03-29 PROCEDURE — 93010 ELECTROCARDIOGRAM REPORT: CPT | Performed by: INTERNAL MEDICINE

## 2025-03-29 PROCEDURE — 93005 ELECTROCARDIOGRAM TRACING: CPT

## 2025-03-29 PROCEDURE — 250N000013 HC RX MED GY IP 250 OP 250 PS 637: Performed by: STUDENT IN AN ORGANIZED HEALTH CARE EDUCATION/TRAINING PROGRAM

## 2025-03-29 RX ORDER — METOPROLOL SUCCINATE 50 MG/1
50 TABLET, EXTENDED RELEASE ORAL DAILY
Status: DISCONTINUED | OUTPATIENT
Start: 2025-03-30 | End: 2025-03-30

## 2025-03-29 RX ORDER — METOPROLOL SUCCINATE 25 MG/1
25 TABLET, EXTENDED RELEASE ORAL ONCE
Status: COMPLETED | OUTPATIENT
Start: 2025-03-29 | End: 2025-03-29

## 2025-03-29 RX ORDER — AMLODIPINE BESYLATE 5 MG/1
5 TABLET ORAL AT BEDTIME
Status: DISCONTINUED | OUTPATIENT
Start: 2025-03-29 | End: 2025-03-30 | Stop reason: HOSPADM

## 2025-03-29 RX ORDER — ISOSORBIDE MONONITRATE 30 MG/1
30 TABLET, EXTENDED RELEASE ORAL DAILY
Status: DISCONTINUED | OUTPATIENT
Start: 2025-03-29 | End: 2025-03-30 | Stop reason: HOSPADM

## 2025-03-29 RX ADMIN — NITROGLYCERIN 0.4 MG: 0.4 TABLET SUBLINGUAL at 09:08

## 2025-03-29 RX ADMIN — NITROGLYCERIN 0.4 MG: 0.4 TABLET SUBLINGUAL at 00:25

## 2025-03-29 RX ADMIN — NITROGLYCERIN 0.4 MG: 0.4 TABLET SUBLINGUAL at 09:18

## 2025-03-29 RX ADMIN — TICAGRELOR 90 MG: 90 TABLET ORAL at 09:09

## 2025-03-29 RX ADMIN — AMLODIPINE BESYLATE 5 MG: 5 TABLET ORAL at 20:11

## 2025-03-29 RX ADMIN — ATORVASTATIN CALCIUM 40 MG: 40 TABLET, FILM COATED ORAL at 20:11

## 2025-03-29 RX ADMIN — METOPROLOL SUCCINATE 25 MG: 25 TABLET, EXTENDED RELEASE ORAL at 10:50

## 2025-03-29 RX ADMIN — ISOSORBIDE MONONITRATE 30 MG: 30 TABLET, EXTENDED RELEASE ORAL at 10:50

## 2025-03-29 RX ADMIN — TICAGRELOR 90 MG: 90 TABLET ORAL at 20:11

## 2025-03-29 RX ADMIN — METOPROLOL SUCCINATE 25 MG: 25 TABLET, EXTENDED RELEASE ORAL at 09:09

## 2025-03-29 RX ADMIN — ASPIRIN 81 MG: 81 TABLET, COATED ORAL at 09:09

## 2025-03-29 ASSESSMENT — ACTIVITIES OF DAILY LIVING (ADL)
ADLS_ACUITY_SCORE: 30

## 2025-03-29 NOTE — PROGRESS NOTES
Observation goals  PRIOR TO DISCHARGE       Comments: -diagnostic tests and consults completed and resulted: Not Met   -vital signs normal or at patient baseline: Not Met   -tolerating oral intake to maintain hydration: Met   -adequate pain control on oral analgesics: Met   -returns to baseline functional status: Met   -safe disposition plan has been identified: Met   Nurse to notify provider when observation goals have been met and patient is ready for discharge.

## 2025-03-29 NOTE — PROGRESS NOTES
"[facility]    Medicine Progress Note - Hospitalist Service    Date of Admission:  3/25/2025    Assessment & Plan     Fredy Chow is a 64 year old male with a past medical history significant for HTN / CAD who presents with CP now s/p angiogram with stent placement and residual vessel disease.     Hospital course has been complicated by diaphoresis post angiogram as well as uncontrolled BP. Cardiology managing and adjusting meds.      NSTEMI  Chest Pain  HTN  HLD  Assessment: recent admission for NSTEMI in 03/2025 and status post PCI of LAD with 2 drug-eluting stents. 50% lesion RCA to be managed medically. Normal EF and no WMA.  Now presenting with recurrent centralized chest discomfort.Troponin elevated to 270, but this is lower than when he was discharged.    Plan:  - Cardiology consulted: input appreciated.  Status post angiogram with stent placement to the diagonal.  Has RCA residual vessel disease  -Continue Brilinta, Beta- blocker and aspirin  -NTG as needed for CP  -Heparin drip discontinued post angio  -Telemetry  -Continue statin  -Patient had an episode of diaphoresis on 3/28/2025, a day after angio. Patient being monitored for recurrence.   - Amlodipine added on by cards for BP control.     CKD likely stage II  Baseline 1.3-1.4  - of note,  patient is unaware of a history of kidney disease, however he has had long standing uncontrolled hypertension which could be a culprit.  -Outpatient follow-up with primary care        Diet: Low Saturated Fat Na <2400 mg    DVT Prophylaxis: Pneumatic Compression Devices  Gifford Catheter: Not present  Lines: None     Cardiac Monitoring: ACTIVE order. Indication: Post- PCI/Angiogram (24 hours)  Code Status: Full Code      Clinically Significant Risk Factors                              # Obesity: Estimated body mass index is 30.61 kg/m  as calculated from the following:    Height as of this encounter: 1.727 m (5' 8\").    Weight as of " this encounter: 91.3 kg (201 lb 4.8 oz)., PRESENT ON ADMISSION            Social Drivers of Health    Tobacco Use: Medium Risk (3/25/2025)    Patient History     Smoking Tobacco Use: Former     Smokeless Tobacco Use: Unknown          Disposition Plan     Medically Ready for Discharge: Anticipated in 2-4 Days             Sophia Hernandez MD  Hospitalist Service  [facility]  Securely message with [employer] (more info)  Text page via [employer] Paging/Directory   ______________________________________________________________________    Interval History   Patient denies any chest pain today, denies any further episodes of diaphoresis.   Patient is concerned about his elevated Bps. Meds to be adjusted by cards.    Physical Exam   Vital Signs: Temp: 98.5  F (36.9  C) Temp src: Oral BP: (!) 131/100 Pulse: 108   Resp: 16 SpO2: 99 % O2 Device: None (Room air)    Weight: 201 lbs 4.8 oz    General Appearance: Well appearing for stated age.  Respiratory: CTAB, no rales or ronchi  Cardiovascular: S1, S2 normal, no murmurs  GI: non-tender on palpation, BS present      Medical Decision Making       55 MINUTES SPENT BY ME on the date of service doing chart review, history, exam, documentation & further activities per the note.      Data     I have personally reviewed the following data over the past 24 hrs:    N/A  \   N/A   / N/A     136 100 23.6 (H) /  168 (H)   4.2 23 1.34 (H) \       Imaging results reviewed over the past 24 hrs:   No results found for this or any previous visit (from the past 24 hours).

## 2025-03-29 NOTE — PROGRESS NOTES
[facility]    Cardiology Consultation     Date of Admission:  3/25/2025    Review of the result(s) of each unique test - Last ECG echocardiogram CBC BMP.     Assessment & Plan   Fredy Chow is a 64 year old male who was admitted on 3/25/2025.    Non-ST elevation myocardial infarction  Coronary artery disease with recent LAD stenting  Acute kidney injury    The patient symptoms were sudden onset and quite typical for a cardiac history.  He was diaphoretic and had left-sided chest discomfort that resolved with nitroglycerin.  Troponins were mildly elevated but not unexpected given his recent ACS.      He went repeat coronary angiogram 3/27 during which he underwent successful stenting of a large diagonal.  He was noted to have RV branch disease although this does represent a smaller system.    Recommendations  -Continue with medical therapy; increase amlodipine to 5 mg daily, start Imdur 30 mg daily, and switch metoprolol succinate to 50 mg once daily daily  -Continue aspirin and Brilinta  -Continue atorvastatin 40 mg daily  -If he has recurrent angina despite medical therapy he may need to have intervention to his RV branch, and may need to be evaluated for LAD/diagonal restenosis or progression of ostial diagonal disease in the future  -We will continue to follow with you    Seth Edd[facility]    Physical Exam   Vital Signs with Ranges  Temp:  [97.6  F (36.4  C)-98.7  F (37.1  C)] 97.7  F (36.5  C)  Pulse:  [] 109  Resp:  [16-18] 16  BP: ()/() 133/93  SpO2:  [90 %-99 %] 99 %  Wt Readings from Last 4 Encounters:   03/29/25 91.3 kg (201 lb 4.8 oz)   03/18/25 93.5 kg (206 lb 3.2 oz)   01/31/25 98.9 kg (218 lb)   06/06/17 90.3 kg (199 lb)     I/O last 3 completed shifts:  In: 240 [P.O.:240]  Out: 254 [Urine:254]    Vitals: BP (!) 133/93 (BP Location: Left arm, Patient Position: Dangled, Cuff Size: Adult Regular)   Pulse 109   Temp 97.7  F (36.5  C) (Oral)   Resp 16  "  Ht 1.727 m (5' 8\")   Wt 91.3 kg (201 lb 4.8 oz)   SpO2 99%   BMI 30.61 kg/m      Physical Exam:   General - Alert and in no acute distress  Respiratory - Clear to auscultation nonlabored  Cardiovascular - Normal regular heart sounds no murmur rubs or gallops no edema  Gastrointestinal - Non distended  Psych - Appropriate affect     Recent Labs   Lab 03/28/25  1609 03/28/25  0609 03/27/25  0730 03/27/25  0717 03/26/25  1122 03/26/25  0620 03/25/25  1425   WBC 10.1  --   --   --  9.7 10.0 10.5   HGB 13.1*  --   --   --  16.0 14.9 15.1   MCV 89  --   --   --  87 88 89     --   --   --  282 239 262   NA  --  135 137  --   --  137 137   POTASSIUM  --  4.4 4.9  --   --  4.4 4.9   CHLORIDE  --  103 104  --   --  103 102   CO2  --  23 21*  --   --  21* 24   BUN  --  23.5* 27.4*  --   --  27.8* 25.7*   CR  --  1.37* 1.41*  --   --  1.32* 1.81*   GFRESTIMATED  --  58* 56*  --   --  61 41*   ANIONGAP  --  9 12  --   --  13 11   PATRIA  --  9.2 9.1  --   --  9.2 9.2   GLC  --  104* 116* 117*  --  106* 103*   ALBUMIN  --   --   --   --   --  4.0 4.3   PROTTOTAL  --   --   --   --   --  7.4 7.7   BILITOTAL  --   --   --   --   --  1.2 1.0   ALKPHOS  --   --   --   --   --  170* 178*   ALT  --   --   --   --   --  35 35   AST  --   --   --   --   --  31 28     Recent Labs   Lab 03/28/25  1609 03/26/25  1122 03/26/25  0620   WBC 10.1 9.7 10.0   HGB 13.1* 16.0 14.9   HCT 37.7* 43.4 42.4   MCV 89 87 88    282 239     Recent Labs   Lab 03/25/25  1425   NTBNPI 265     Recent Labs   Lab 03/28/25  1609 03/26/25  1122 03/26/25  0620    282 239     Imaging:  Recent Results (from the past 48 hours)   US Lower Extremity Venous Duplex Left    Narrative    EXAM: US LOWER EXTREMITY VENOUS DUPLEX LEFT  LOCATION: [facility]  DATE: 3/25/2025    INDICATION: Left leg pain and swelling.  COMPARISON: None.  TECHNIQUE: Venous Duplex ultrasound of the left lower extremity with and without compression, " "augmentation and duplex. Color flow and spectral Doppler with waveform analysis performed.    FINDINGS: Exam includes the common femoral, femoral, popliteal, and contralateral common femoral veins as well as segmentally visualized deep calf veins and greater saphenous vein.     LEFT: No deep vein thrombosis. No superficial thrombophlebitis. No popliteal cyst.      Impression    IMPRESSION:  No deep venous thrombosis in the left lower extremity.   XR Chest 2 Views    Narrative    EXAM: XR CHEST 2 VIEWS  LOCATION: [facility]  DATE: 3/25/2025    INDICATION: cp  COMPARISON: None.      Impression    IMPRESSION: Negative chest.     Clinically Significant Risk Factors                              # Obesity: Estimated body mass index is 30.61 kg/m  as calculated from the following:    Height as of this encounter: 1.727 m (5' 8\").    Weight as of this encounter: 91.3 kg (201 lb 4.8 oz)., PRESENT ON ADMISSION                               "

## 2025-03-29 NOTE — PLAN OF CARE
Goal Outcome Evaluation: Pt is A&Ox4, denies chest pain, VSS and on tele SR/ST, on RA and LS clear, up independently, R wrist old angio site and R groin bruised, soft and good pulses and CMS. Plan: discharge home pending.      Plan of Care Reviewed With: patient    Overall Patient Progress: no changeOverall Patient Progress: no change

## 2025-03-29 NOTE — PLAN OF CARE
A&OX4, RA, hypertensive, denies pain. Tele- SR, up independent. On cardiac diet       CP overnight, relieved with 1 SL nitro

## 2025-03-29 NOTE — CONSULTS
NUTRITION EDUCATION    REASON FOR ASSESSMENT:  Nutrition education on American Heart Association (AHA) Heart Healthy Diet.    NUTRITION HISTORY:  Information obtained from patient. Due to his high blood pressure, patient has made significant improvements to his diet starting approximately 2 months ago. He has reduced consumption of deep fried foods, candy, and coke. Additionally, he is consuming more lean proteins like chicken and salmon.    CURRENT DIET ORDER:  Cardiac diet     INTERVENTIONS:  Nutrition Prescription:  Recommended AHA Heart Healthy Diet    Implementation:   Nutrition Education (Content):  Reviewed Heart Healthy Diet guidelines  Pt politely declined written education at this time.     Mira Bill, RD, LD, CNSC   Available on [employer]

## 2025-03-30 VITALS
RESPIRATION RATE: 16 BRPM | HEART RATE: 102 BPM | DIASTOLIC BLOOD PRESSURE: 99 MMHG | TEMPERATURE: 97.4 F | HEIGHT: 68 IN | BODY MASS INDEX: 30.42 KG/M2 | WEIGHT: 200.7 LBS | OXYGEN SATURATION: 95 % | SYSTOLIC BLOOD PRESSURE: 141 MMHG

## 2025-03-30 LAB
ATRIAL RATE - MUSE: 105 BPM
DIASTOLIC BLOOD PRESSURE - MUSE: NORMAL MMHG
INTERPRETATION ECG - MUSE: NORMAL
P AXIS - MUSE: 46 DEGREES
PR INTERVAL - MUSE: 134 MS
QRS DURATION - MUSE: 86 MS
QT - MUSE: 304 MS
QTC - MUSE: 401 MS
R AXIS - MUSE: 43 DEGREES
SYSTOLIC BLOOD PRESSURE - MUSE: NORMAL MMHG
T AXIS - MUSE: 74 DEGREES
VENTRICULAR RATE- MUSE: 105 BPM

## 2025-03-30 PROCEDURE — 250N000013 HC RX MED GY IP 250 OP 250 PS 637: Performed by: INTERNAL MEDICINE

## 2025-03-30 PROCEDURE — 99232 SBSQ HOSP IP/OBS MODERATE 35: CPT | Performed by: INTERNAL MEDICINE

## 2025-03-30 PROCEDURE — 250N000013 HC RX MED GY IP 250 OP 250 PS 637: Performed by: STUDENT IN AN ORGANIZED HEALTH CARE EDUCATION/TRAINING PROGRAM

## 2025-03-30 PROCEDURE — 99239 HOSP IP/OBS DSCHRG MGMT >30: CPT | Performed by: INTERNAL MEDICINE

## 2025-03-30 RX ORDER — METOPROLOL SUCCINATE 25 MG/1
25 TABLET, EXTENDED RELEASE ORAL ONCE
Status: COMPLETED | OUTPATIENT
Start: 2025-03-30 | End: 2025-03-30

## 2025-03-30 RX ORDER — METOPROLOL SUCCINATE 25 MG/1
75 TABLET, EXTENDED RELEASE ORAL DAILY
Qty: 90 TABLET | Refills: 0 | Status: SHIPPED | OUTPATIENT
Start: 2025-03-31 | End: 2025-04-07

## 2025-03-30 RX ORDER — ISOSORBIDE MONONITRATE 30 MG/1
30 TABLET, EXTENDED RELEASE ORAL DAILY
Qty: 30 TABLET | Refills: 0 | Status: SHIPPED | OUTPATIENT
Start: 2025-03-31 | End: 2025-04-07

## 2025-03-30 RX ORDER — AMLODIPINE BESYLATE 5 MG/1
5 TABLET ORAL AT BEDTIME
Qty: 30 TABLET | Refills: 0 | Status: SHIPPED | OUTPATIENT
Start: 2025-03-30 | End: 2025-04-07

## 2025-03-30 RX ORDER — METOPROLOL SUCCINATE 50 MG/1
50 TABLET, EXTENDED RELEASE ORAL DAILY
Qty: 30 TABLET | Refills: 0 | Status: SHIPPED | OUTPATIENT
Start: 2025-03-31 | End: 2025-03-30

## 2025-03-30 RX ADMIN — ASPIRIN 81 MG: 81 TABLET, COATED ORAL at 10:46

## 2025-03-30 RX ADMIN — TICAGRELOR 90 MG: 90 TABLET ORAL at 10:46

## 2025-03-30 RX ADMIN — METOPROLOL SUCCINATE 50 MG: 50 TABLET, FILM COATED, EXTENDED RELEASE ORAL at 10:46

## 2025-03-30 RX ADMIN — METOPROLOL SUCCINATE 25 MG: 25 TABLET, EXTENDED RELEASE ORAL at 13:54

## 2025-03-30 RX ADMIN — ISOSORBIDE MONONITRATE 30 MG: 30 TABLET, EXTENDED RELEASE ORAL at 10:46

## 2025-03-30 ASSESSMENT — ACTIVITIES OF DAILY LIVING (ADL)
ADLS_ACUITY_SCORE: 30

## 2025-03-30 NOTE — PLAN OF CARE
A&O x 4. VSS. RA. Tele: SR. Up independent. Denies pain/SOB/CP. Bruising to R) radial & R) groin site. Pt discharged to home. Discharge instructions & medications given to pt, questions were answered, no concerns reported. Pt's angioseal & stent cards sent w/patient. Pt escorted to door six via wheelchair and hospital staff.        HSQ    - Dispo: Discharge planning to group home

## 2025-03-30 NOTE — PROGRESS NOTES
[facility]    Cardiology Progress Note     Date of Admission:  3/25/2025    Review of the result(s) of each unique test - Last ECG echocardiogram CBC BMP.     Assessment & Plan   Fredy Chow is a 64 year old male who was admitted on 3/25/2025.    Non-ST elevation myocardial infarction  Coronary artery disease with recent LAD stenting  Acute kidney injury    The patient symptoms were sudden onset and quite typical for ischemia.  He was diaphoretic and had left-sided chest discomfort that resolved with nitroglycerin.  Troponins were mildly elevated but not unexpected given his recent ACS.      He underwent repeat coronary angiogram 3/27 during which he underwent successful stenting of a large diagonal.  He was noted to have RV branch disease although this does represent a smaller system.    Recommendations  -Continue with medical therapy; continue amlodipine 5 mg daily, continue Imdur 30 mg daily, and increase metoprolol succinate to 75 mg once daily given ongoing hypertension and evaded heart rates  -Continue aspirin lifelong and Brilinta for 12 months  -Continue atorvastatin 40 mg daily  -If he has recurrent angina despite medical therapy he may need to have intervention to his RV branch, and may need to be evaluated for LAD/diagonal restenosis or progression of ostial diagonal disease in the future  -Cardiology will sign off at this point  -He has follow-up with his primary care provider and cardiac rehabilitation this week    Seth [facility]    Physical Exam   Vital Signs with Ranges  Temp:  [97.4  F (36.3  C)-98.6  F (37  C)] 97.4  F (36.3  C)  Pulse:  [] 102  Resp:  [16] 16  BP: (128-145)/() 141/99  SpO2:  [95 %-100 %] 95 %  Wt Readings from Last 4 Encounters:   03/30/25 91 kg (200 lb 11.2 oz)   03/18/25 93.5 kg (206 lb 3.2 oz)   01/31/25 98.9 kg (218 lb)   06/06/17 90.3 kg (199 lb)     I/O last 3 completed shifts:  In: 240 [P.O.:240]  Out: 100  "[Urine:100]    Vitals: BP (!) 141/99 (BP Location: Right arm)   Pulse 102   Temp 97.4  F (36.3  C) (Oral)   Resp 16   Ht 1.727 m (5' 8\")   Wt 91 kg (200 lb 11.2 oz)   SpO2 95%   BMI 30.52 kg/m      Physical Exam:   General - Alert and in no acute distress  Respiratory - Clear to auscultation nonlabored  Cardiovascular - Normal regular heart sounds no murmur rubs or gallops no edema  Gastrointestinal - Non distended  Psych - Appropriate affect     Recent Labs   Lab 03/29/25  1448 03/28/25  1609 03/28/25  0609 03/27/25  0730 03/27/25  0717 03/26/25  1122 03/26/25  0620 03/25/25  1425   WBC  --  10.1  --   --   --  9.7 10.0 10.5   HGB  --  13.1*  --   --   --  16.0 14.9 15.1   MCV  --  89  --   --   --  87 88 89   PLT  --  222  --   --   --  282 239 262     --  135 137  --   --  137 137   POTASSIUM 4.2  --  4.4 4.9  --   --  4.4 4.9   CHLORIDE 100  --  103 104  --   --  103 102   CO2 23  --  23 21*  --   --  21* 24   BUN 23.6*  --  23.5* 27.4*  --   --  27.8* 25.7*   CR 1.34*  --  1.37* 1.41*  --   --  1.32* 1.81*   GFRESTIMATED 60*  --  58* 56*  --   --  61 41*   ANIONGAP 13  --  9 12  --   --  13 11   PATRIA 9.6  --  9.2 9.1  --   --  9.2 9.2   *  --  104* 116*   < >  --  106* 103*   ALBUMIN  --   --   --   --   --   --  4.0 4.3   PROTTOTAL  --   --   --   --   --   --  7.4 7.7   BILITOTAL  --   --   --   --   --   --  1.2 1.0   ALKPHOS  --   --   --   --   --   --  170* 178*   ALT  --   --   --   --   --   --  35 35   AST  --   --   --   --   --   --  31 28    < > = values in this interval not displayed.     Recent Labs   Lab 03/28/25  1609 03/26/25  1122 03/26/25  0620   WBC 10.1 9.7 10.0   HGB 13.1* 16.0 14.9   HCT 37.7* 43.4 42.4   MCV 89 87 88    282 239     Recent Labs   Lab 03/25/25  1425   NTBNPI 265     Recent Labs   Lab 03/28/25  1609 03/26/25  1122 03/26/25  0620    282 239     Imaging:  Recent Results (from the past 48 hours)   US Lower Extremity Venous Duplex Left    " "Narrative    EXAM: US LOWER EXTREMITY VENOUS DUPLEX LEFT  LOCATION: [facility]  DATE: 3/25/2025    INDICATION: Left leg pain and swelling.  COMPARISON: None.  TECHNIQUE: Venous Duplex ultrasound of the left lower extremity with and without compression, augmentation and duplex. Color flow and spectral Doppler with waveform analysis performed.    FINDINGS: Exam includes the common femoral, femoral, popliteal, and contralateral common femoral veins as well as segmentally visualized deep calf veins and greater saphenous vein.     LEFT: No deep vein thrombosis. No superficial thrombophlebitis. No popliteal cyst.      Impression    IMPRESSION:  No deep venous thrombosis in the left lower extremity.   XR Chest 2 Views    Narrative    EXAM: XR CHEST 2 VIEWS  LOCATION: [facility]  DATE: 3/25/2025    INDICATION: cp  COMPARISON: None.      Impression    IMPRESSION: Negative chest.     Clinically Significant Risk Factors                              # Obesity: Estimated body mass index is 30.52 kg/m  as calculated from the following:    Height as of this encounter: 1.727 m (5' 8\").    Weight as of this encounter: 91 kg (200 lb 11.2 oz).                                "

## 2025-03-30 NOTE — DISCHARGE SUMMARY
[facility]  Hospitalist Discharge Summary      Date of Admission:  3/25/2025  Date of Discharge:  3/30/2025  Discharging Provider: Jason Ayoub MD  Discharge Service: Hospitalist Service    Discharge Diagnoses   Fredy Chow is a 64 year old male with a past medical history significant for HTN / CAD who presents with CP now s/p angiogram with stent placement and residual vessel disease.      Hospital course has been complicated by diaphoresis post angiogram as well as uncontrolled BP. Cardiology managing and adjusting meds.      NSTEMI  Chest Pain  HTN  HLD  Assessment: recent admission for NSTEMI in 03/2025 and status post PCI of LAD with 2 drug-eluting stents. 50% lesion RCA to be managed medically. Normal EF and no WMA.  Now presenting with recurrent centralized chest discomfort.Troponin elevated to 270, but this is lower than when he was discharged.    Plan:  - Cardiology consulted: input appreciated.  Status post angiogram with stent placement to the diagonal.  Has RCA residual vessel disease  -Continue Brilinta, Beta- blocker and aspirin  -NTG as needed for CP  -Heparin drip discontinued post angio  -Telemetry  -Continue statin  -Patient had an episode of diaphoresis on 3/28/2025, a day after angio. Patient being monitored for recurrence.   - Amlodipine added on by cards for BP control.  - metoprolol dose increased to 75 mg daily     CKD likely stage II  Baseline 1.3-1.4  - of note,  patient is unaware of a history of kidney disease, however he has had long standing uncontrolled hypertension which could be a culprit.  -Outpatient follow-up with primary care        Diet: Low Saturated Fat Na <2400 mg    DVT Prophylaxis: Pneumatic Compression Devices  Gifford Catheter: Not present  Lines: None     Cardiac Monitoring: ACTIVE order. Indication: Post- PCI/Angiogram (24 hours)  Code Status: Full Code          Clinically Significant Risk Factors                              "  # Obesity: Estimated body mass index is 30.61 kg/m  as calculated from the following:    Height as of this encounter: 1.727 m (5' 8\").    Weight as of this encounter: 91.3 kg (201 lb 4.8 oz)., PRESENT ON ADMISSION                   Social Drivers of Health       Tobacco Use: Medium Risk (3/25/2025)     Patient History      Smoking Tobacco Use: Former      Smokeless Tobacco Use: Unknown               Disposition Plan     Medically Ready for Discharge:     Clinically Significant Risk Factors     # Obesity: Estimated body mass index is 30.52 kg/m  as calculated from the following:    Height as of this encounter: 1.727 m (5' 8\").    Weight as of this encounter: 91 kg (200 lb 11.2 oz).       Follow-ups Needed After Discharge   Follow-up Appointments       Follow Up      Follow up with cardiology as directed in a few weeks    Follow up with PCP in 1 month            {Additional follow-up instructions/to-do's for PCP  and Cardiology    Unresulted Labs Ordered in the Past 30 Days of this Admission       No orders found from 2/23/2025 to 3/26/2025.        These results will be followed up by Cardiology and PCP    Discharge Disposition   Discharged to home  Condition at discharge: Stable    Hospital Course   Fredy Chow is a 64 year old male with a past medical history significant for HTN / CAD who presents with CP now s/p angiogram with stent placement and residual vessel disease.      Hospital course has been complicated by diaphoresis post angiogram as well as uncontrolled BP. Cardiology managing and adjusting meds.      NSTEMI  Chest Pain  HTN  HLD  Assessment: recent admission for NSTEMI in 03/2025 and status post PCI of LAD with 2 drug-eluting stents. 50% lesion RCA to be managed medically. Normal EF and no WMA.  Now presenting with recurrent centralized chest discomfort.Troponin elevated to 270, but this is lower than when he was discharged.    Plan:  - Cardiology consulted: input appreciated.  Status " "post angiogram with stent placement to the diagonal.  Has RCA residual vessel disease  -Continue Brilinta, Beta- blocker and aspirin  -NTG as needed for CP  -Heparin drip discontinued post angio  -Telemetry  -Continue statin  -Patient had an episode of diaphoresis on 3/28/2025, a day after angio. Patient being monitored for recurrence.   - Amlodipine added on by cards for BP control.     CKD likely stage II  Baseline 1.3-1.4  - of note,  patient is unaware of a history of kidney disease, however he has had long standing uncontrolled hypertension which could be a culprit.  -Outpatient follow-up with primary care        Diet: Low Saturated Fat Na <2400 mg    DVT Prophylaxis: Pneumatic Compression Devices  Gifford Catheter: Not present  Lines: None     Cardiac Monitoring: ACTIVE order. Indication: Post- PCI/Angiogram (24 hours)  Code Status: Full Code          Clinically Significant Risk Factors                               # Obesity: Estimated body mass index is 30.61 kg/m  as calculated from the following:    Height as of this encounter: 1.727 m (5' 8\").    Weight as of this encounter: 91.3 kg (201 lb 4.8 oz)., PRESENT ON ADMISSION                   Social Drivers of Health       Tobacco Use: Medium Risk (3/25/2025)     Patient History      Smoking Tobacco Use: Former      Smokeless Tobacco Use: Unknown               Disposition Plan     Medically Ready for Discharge:     Consultations This Hospital Stay   CARDIOLOGY IP CONSULT  PHARMACY IP CONSULT  HOSPITALIST IP CONSULT  NUTRITION SERVICES ADULT IP CONSULT  CARDIAC REHAB IP CONSULT  SMOKING CESSATION PROGRAM IP CONSULT    Code Status   Full Code    Time Spent on this Encounter   I, Jason Ayoub MD, personally saw the patient today and spent greater than 30 minutes discharging this patient.       Jason Ayoub MD  [facility]  [address] [place] [address] AVE, [address] [address]  Phone: " [phone]  ______________________________________________________________________    Physical Exam   Vital Signs: Temp: 97.4  F (36.3  C) Temp src: Oral BP: (!) 141/99 Pulse: 102   Resp: 16 SpO2: 95 % O2 Device: None (Room air)    Weight: 200 lbs 11.2 oz         Primary Care Physician   Caitlyn Clements    Discharge Orders      Cardiac Rehab  Referral      Reason Lipid Lowering Medications not prescribed from this order set    Already ordered from last heart cath     Reason aspirin not prescribed from this order set    Already ordered from last heart cath     Reason for your hospital stay    Admit with NSTEMI and underwent stenting of LAD and Dx     Activity    Your activity upon discharge: activity as tolerated     Follow Up    Follow up with cardiology as directed in a few weeks    Follow up with PCP in 1 month     Diet    Follow this diet upon discharge: Current Diet:Orders Placed This Encounter      Low Saturated Fat Na <2400 mg       Significant Results and Procedures   Most Recent 3 CBC's:  Recent Labs   Lab Test 03/28/25  1609 03/26/25  1122 03/26/25  0620   WBC 10.1 9.7 10.0   HGB 13.1* 16.0 14.9   MCV 89 87 88    282 239     Most Recent 3 BMP's:  Recent Labs   Lab Test 03/29/25  1448 03/28/25  0609 03/27/25  0730    135 137   POTASSIUM 4.2 4.4 4.9   CHLORIDE 100 103 104   CO2 23 23 21*   BUN 23.6* 23.5* 27.4*   CR 1.34* 1.37* 1.41*   ANIONGAP 13 9 12   PATRIA 9.6 9.2 9.1   * 104* 116*   ,   Results for orders placed or performed during the hospital encounter of 03/25/25   XR Chest 2 Views    Narrative    EXAM: XR CHEST 2 VIEWS  LOCATION: [facility]  DATE: 3/25/2025    INDICATION: cp  COMPARISON: None.      Impression    IMPRESSION: Negative chest.   US Lower Extremity Venous Duplex Left    Narrative    EXAM: US LOWER EXTREMITY VENOUS DUPLEX LEFT  LOCATION: [facility]  DATE: 3/25/2025    INDICATION: Left leg pain and  swelling.  COMPARISON: None.  TECHNIQUE: Venous Duplex ultrasound of the left lower extremity with and without compression, augmentation and duplex. Color flow and spectral Doppler with waveform analysis performed.    FINDINGS: Exam includes the common femoral, femoral, popliteal, and contralateral common femoral veins as well as segmentally visualized deep calf veins and greater saphenous vein.     LEFT: No deep vein thrombosis. No superficial thrombophlebitis. No popliteal cyst.      Impression    IMPRESSION:  No deep venous thrombosis in the left lower extremity.   Echocardiogram Limited    Narrative    [id number]  [id number]  [id number]  [id number]^[email or web address]^RICK     [facility]  Echocardiography Laboratory  [address] [place] [id number]     Name: EDUARDO BENDER  MRN: [id number]  : 1960  Study Date: 2025 10:10 AM  Age: 64 yrs  Gender: Male  Patient Location: [facility]  Reason For Study: Chest Pain  Ordering Physician: RICK CHATMAN  Referring Physician: Caitlyn Clements  Performed By: Lawrence Joyce     BSA: 2.0 m2  Height: 68 in  Weight: 200 lb  HR: 101  BP: 126/88 mmHg  ______________________________________________________________________________  Procedure  Limited Echocardiogram with two-dimensional, color and spectral Doppler.  Definity (NDC #82288-125) given intravenously.  ______________________________________________________________________________  Interpretation Summary     Contrast was used without apparent complications. Normal transthoracic  echocardiogram. Compared to prior study, there is no significant change.  ______________________________________________________________________________  Left Ventricle  The left ventricle is normal in size. There is normal left ventricular wall  thickness. Left ventricular systolic function is normal. Normal left  ventricular wall motion. No evidence of left ventricular mass or tumors.     Right Ventricle  The right  ventricle is normal in structure, function and size.     Atria  Normal left atrial size. Right atrial size is normal.     Mitral Valve  The mitral valve leaflets appear normal. There is no evidence of stenosis,  fluttering, or prolapse.     Tricuspid Valve  Normal tricuspid valve.     Aortic Valve  Normal tricuspid aortic valve.     Pulmonic Valve  Normal pulmonic valve.     Vessels  The aortic root is normal size. Normal size ascending aorta.     Pericardium  The pericardium appears normal.     ______________________________________________________________________________  MMode/2D Measurements & Calculations  IVSd: 1.0 cm  LVIDd: 4.3 cm  LVIDs: 2.9 cm  LVPWd: 1.2 cm  FS: 31.8 %  LV mass(C)d: 167.3 grams  LV mass(C)dI: 81.9 grams/m2     Ao root diam: 3.1 cm  asc Aorta Diam: 3.6 cm  LVOT diam: 2.2 cm  LVOT area: 3.9 cm2  RVOT diam: 1.8 cm  Ao root diam index Ht(cm/m): 1.8  Ao root diam index BSA (cm/m2): 1.5  Asc Ao diam index BSA (cm/m2): 1.7  Asc Ao diam index Ht(cm/m): 2.1  RWT: 0.58     ______________________________________________________________________________  Report approved by: Riky Biswas MD on 03/26/2025 11:53 AM         Cardiac Catheterization    Narrative      Mid Cx lesion is 35% stenosed.    Prox LAD lesion is 40% stenosed.    2nd Diag lesion is 80% stenosed.    Mid RCA lesion is 35% stenosed.    RV Branch lesion is 90% stenosed.    Prox RCA to Mid RCA lesion is 35% stenosed.    Pt with chest pain  10 days post Anterior MI and  s/p stenting mid LAD.    Unable to localize source of chest pain (possible Dressler's, Diag, RV   branch) Pt now with acute on chronic renal insufficiency which limits dye   load.  I discussed the case with Dr Colón, initial suggestion was   ongoing medical Rx but pt has had 2 episodes of chest pain within last 48   hours including an RRT event this AM.  Given that the diagonal may cover a   larger territory elected to repair Diag today.  This diag exits the Lad   through  "the LAD stent, there is mild/mod ostial Diag disease and then   severe disease in mid Diag.  Given limited dye load option I ballooned (NC   and cutting balloon) the mid Diag and tried to avoid repairing the ostial   diagonal since it had a \"V\" take off not a \"T\" take off which might have   required converting to a bifurcation stenting of Lad/D-this increasing   chance of restenosis and increased dye load.  Successfully stented mid   Diag taking vessel 80-85% down to 0% mid diagonal and the ostial diagonal   has moderate disease.  Patient will need to be watched for recurrent angina, in interim I have   increased Toprol and added amlodipine for anti-anginal and anti-HTN   effect.  Watch for Lad/D restenosis or progression of ostial diag lesion.    The RV branch could be intervened on if necessary but it is a smaller   system   (not done today due to renal insufficiency and dye load)         Discharge Medications   Current Discharge Medication List        START taking these medications    Details   amLODIPine (NORVASC) 5 MG tablet Take 1 tablet (5 mg) by mouth at bedtime.  Qty: 30 tablet, Refills: 0    Associated Diagnoses: NSTEMI (non-ST elevated myocardial infarction) (H)      isosorbide mononitrate (IMDUR) 30 MG 24 hr tablet Take 1 tablet (30 mg) by mouth daily.  Qty: 30 tablet, Refills: 0    Associated Diagnoses: NSTEMI (non-ST elevated myocardial infarction) (H)           CONTINUE these medications which have CHANGED    Details   metoprolol succinate ER (TOPROL XL) 25 MG 24 hr tablet Take 3 tablets (75 mg) by mouth daily.  Qty: 90 tablet, Refills: 0    Associated Diagnoses: NSTEMI (non-ST elevated myocardial infarction) (H)           CONTINUE these medications which have NOT CHANGED    Details   aspirin (ASA) 81 MG chewable tablet Take 1 tablet (81 mg) by mouth daily. Starting tomorrow.  Qty: 30 tablet, Refills: 3    Associated Diagnoses: NSTEMI (non-ST elevated myocardial infarction) (H)      atorvastatin " (LIPITOR) 40 MG tablet Take 1 tablet (40 mg) by mouth daily.  Qty: 90 tablet, Refills: 3    Associated Diagnoses: NSTEMI (non-ST elevated myocardial infarction) (H)      lisinopril (ZESTRIL) 10 MG tablet TAKE ONE TABLET BY MOUTH ONCE DAILY  Qty: 30 tablet, Refills: 1    Associated Diagnoses: Benign essential hypertension      nitroGLYcerin (NITROSTAT) 0.4 MG sublingual tablet For chest pain place 1 tablet under the tongue every 5 minutes for 3 doses. If symptoms persist 5 minutes after 1st dose call 911.  Qty: 30 tablet, Refills: 0    Associated Diagnoses: NSTEMI (non-ST elevated myocardial infarction) (H)      ticagrelor (BRILINTA) 90 MG tablet Take 1 tablet (90 mg) by mouth 2 times daily. Dose to start this evening.  Qty: 180 tablet, Refills: 3    Associated Diagnoses: NSTEMI (non-ST elevated myocardial infarction) (H)           Allergies   No Known Allergies

## 2025-03-30 NOTE — DISCHARGE INSTRUCTIONS
Cardiac Angiogram Discharge Instructions - Brachial & Radial    After you go home:    Have an adult stay with you until tomorrow.  Drink extra fluids for 2 days.  You may resume your normal diet.  No smoking       For 24 hours - due to the sedation you received:  Relax and take it easy.  Do NOT make any important or legal decisions.  Do NOT drive or operate machines at home or at work.  Do NOT drink alcohol.    Care of Arm & Wrist Puncture Sites:    For the first 24 hrs - check the puncture site every 1-2 hours while awake.  It is normal to have soreness at the puncture site and mild tingling in your hand for up to 3 days.  Remove the bandaid after 24 hours. If there is minor oozing, apply another bandaid and remove it after 12 hours.  You may shower. Do NOT take a bath, or use a hot tub or pool for at least 3 days. Do NOT scrub the site. Do not use lotion or powder near the puncture site.           Activity - For 2 days:    Arm site:    Do not use your hand or arm to support your weight (such as rising from a chair)   Do not lift more than 5 pounds or exercise your arm (such as tennis, golf or bowling).       Wrist site:  do not use your hand or arm to support your weight (such as rising from a chair)   do not bend your wrist (such as lifting a garage door).  do not lift more than 5 pounds or exercise your arm (such as tennis, golf or bowling).  Do NOT do any heavy activity such as exercise, lifting, or straining.     Bleeding:     Arm site:  If you start bleeding from the site in your arm, sit down and press firmly on/above the site with your fingers for 10 minutes.  Once bleeding stops, keep your arm straight for 2 hours.   [facility] as soon as you can.    Wrist site:  If you start bleeding from the site in your wrist, sit down and press firmly on/above the site for 10 minutes.   Once bleeding stops, keep arm still for 2 hours.   Call [facility] as soon as you can.    Call 911 right away if  you have heavy bleeding or bleeding that does not stop.      Medicines:    If you are taking an antiplatelet medication such as Plavix, Brilinta or Effient, do not stop taking it until you talk to your cardiologist.      If you are on Metformin (Glucophage), do not restart it until you have blood tests (within 2 to 3 days after discharge).  After you have your blood drawn, you may restart the Metformin.  Take your medications, including blood thinners, unless your provider tells you not to.    If you take Coumadin (Warfarin), have your INR checked by your provider in  3-5 days. Call your clinic to schedule this.  If you have stopped any other medicines, check with your provider about when to restart them.    Follow Up Appointments:    Follow up with [facility] Nurse Practitioner at [facility] of patient preference in 7-10 days.    Call the clinic if:    You have increased pain or a large or growing hard lump around the site.  The site is red, swollen, hot or tender.  Blood or fluid is draining from the site.  You have chills or a fever greater than 101 F (38 C).  Your arm feels numb, cool or changes color.  You have hives, a rash or unusual itching.  Any questions or concerns.      [facility] [place]:    [phone] [facility] (7 days a week)    Or you may contact your provider via My Chart     Cardiac Angiogram Discharge Instructions - Femoral    After you go home:    Have an adult stay with you until tomorrow.  Drink extra fluids for 2 days.  You may resume your normal diet.  No smoking       For 24 hours - due to the sedation you received:  Relax and take it easy.  Do NOT make any important or legal decisions.  Do NOT drive or operate machines at home or at work.  Do NOT drink alcohol.    Care of Groin Puncture Site:    For the first 24 hrs - check the puncture site every 1-2 hours while awake.  For 2 days, when you cough, sneeze, laugh or move your bowels, hold your hand over the  puncture site and press firmly.  Remove the bandaid after 24 hours. If there is minor oozing, apply another bandaid and remove it after 12 hours.  It is normal to have a small bruise or pea size lump at the site.  You may shower tomorrow. Do NOT take a bath, or use a hot tub or pool for at least 3 days. Do NOT scrub the site. Do not use lotion or powder near the puncture site.    Activity:            For 2 days:  No stooping or squatting  Do NOT do any heavy activity such as exercise, lifting, or straining.   No housework, yard work or any activity that make you sweat  Do NOT lift more than 10 pounds    Bleeding:    If you start bleeding from the site in your groin, lie down flat and press firmly on/above the site for 10 minutes.   Once bleeding stops, lay flat for 2 hours.   Call [facility] as soon as you can.       Call 911 right away if you have heavy bleeding or bleeding that does not stop.      Medicines:    If you are taking an antiplatelet medication such as Plavix, Brilinta or Effient, do not stop taking it until you talk to your cardiologist.    If you are on Metformin (Glucophage), do not restart it until you have blood tests (within 2 to 3 days after discharge).  After you have your blood drawn, you may restart the Metformin.   Take your medications, including blood thinners, unless your provider tells you not to.    If you take Coumadin (Warfarin), have your INR checked by your provider in  3-5 days. Call your clinic to schedule this.  If you have stopped any medicines, check with your provider about when to restart them.    Follow Up Appointments:    Follow up with [facility] Nurse Practitioner at [facility] of patient preference in 7-10 days.    Call the clinic if:    You have increased pain or a large or growing hard lump around the site.  The site is red, swollen, hot or tender.  Blood or fluid is draining from the site.  You have chills or a fever greater than 101 F (38 C).  Your leg feels  numb, cool or changes color.  You have hives, a rash or unusual itching.  New pain in the back or belly that you cannot control with Tylenol.  Any questions or concerns.          [facility] [place]:    [phone] [facility] (7 days a week)    Or you may contact your provider via My Chart

## 2025-03-30 NOTE — PROGRESS NOTES
A&O x4 pleasant and cooperative. Bruised Rt radial, Rt groin site. Nosebleed. Up independent in room. Denies pain. HTN 140s systolic.

## 2025-03-30 NOTE — DISCHARGE SUMMARY
[facility]  Hospitalist Discharge Summary      Date of Admission:  3/25/2025  Date of Discharge:  3/30/2025  Discharging Provider: Jason Ayoub MD  Discharge Service: Hospitalist Service    Discharge Diagnoses   Fredy Chow is a 64 year old male with a past medical history significant for HTN / CAD who presents with CP now s/p angiogram with stent placement and residual vessel disease.      Hospital course has been complicated by diaphoresis post angiogram as well as uncontrolled BP. Cardiology managing and adjusting meds.      NSTEMI  Chest Pain  HTN  HLD  Assessment: recent admission for NSTEMI in 03/2025 and status post PCI of LAD with 2 drug-eluting stents. 50% lesion RCA to be managed medically. Normal EF and no WMA.  Now presenting with recurrent centralized chest discomfort.Troponin elevated to 270, but this is lower than when he was discharged.    Plan:  - Cardiology consulted: input appreciated.  Status post angiogram with stent placement to the diagonal.  Has RCA residual vessel disease  -Continue Brilinta, Beta- blocker and aspirin  -NTG as needed for CP  -Heparin drip discontinued post angio  -Telemetry  -Continue statin  -Patient had an episode of diaphoresis on 3/28/2025, a day after angio. Patient being monitored for recurrence.   - Amlodipine added on by cards for BP control.  - metoprolol dose increased to 75 mg daily     CKD likely stage II  Baseline 1.3-1.4  - of note,  patient is unaware of a history of kidney disease, however he has had long standing uncontrolled hypertension which could be a culprit.  -Outpatient follow-up with primary care        Diet: Low Saturated Fat Na <2400 mg    DVT Prophylaxis: Pneumatic Compression Devices  Gifford Catheter: Not present  Lines: None     Cardiac Monitoring: ACTIVE order. Indication: Post- PCI/Angiogram (24 hours)  Code Status: Full Code          Clinically Significant Risk Factors                              "  # Obesity: Estimated body mass index is 30.61 kg/m  as calculated from the following:    Height as of this encounter: 1.727 m (5' 8\").    Weight as of this encounter: 91.3 kg (201 lb 4.8 oz)., PRESENT ON ADMISSION                   Social Drivers of Health       Tobacco Use: Medium Risk (3/25/2025)     Patient History      Smoking Tobacco Use: Former      Smokeless Tobacco Use: Unknown               Disposition Plan     Medically Ready for Discharge:     Clinically Significant Risk Factors     # Obesity: Estimated body mass index is 30.52 kg/m  as calculated from the following:    Height as of this encounter: 1.727 m (5' 8\").    Weight as of this encounter: 91 kg (200 lb 11.2 oz).       Follow-ups Needed After Discharge   Follow-up Appointments       Follow Up      Follow up with cardiology as directed in a few weeks    Follow up with PCP in 1 month            {Additional follow-up instructions/to-do's for PCP  and Cardiology    Unresulted Labs Ordered in the Past 30 Days of this Admission       No orders found from 2/23/2025 to 3/26/2025.        These results will be followed up by Cardiology and PCP    Discharge Disposition   Discharged to home  Condition at discharge: Stable    Hospital Course   Fredy Chow is a 64 year old male with a past medical history significant for HTN / CAD who presents with CP now s/p angiogram with stent placement and residual vessel disease.      Hospital course has been complicated by diaphoresis post angiogram as well as uncontrolled BP. Cardiology managing and adjusting meds.      NSTEMI  Chest Pain  HTN  HLD  Assessment: recent admission for NSTEMI in 03/2025 and status post PCI of LAD with 2 drug-eluting stents. 50% lesion RCA to be managed medically. Normal EF and no WMA.  Now presenting with recurrent centralized chest discomfort.Troponin elevated to 270, but this is lower than when he was discharged.    Plan:  - Cardiology consulted: input appreciated.  Status " "post angiogram with stent placement to the diagonal.  Has RCA residual vessel disease  -Continue Brilinta, Beta- blocker and aspirin  -NTG as needed for CP  -Heparin drip discontinued post angio  -Telemetry  -Continue statin  -Patient had an episode of diaphoresis on 3/28/2025, a day after angio. Patient being monitored for recurrence.   - Amlodipine added on by cards for BP control.     CKD likely stage II  Baseline 1.3-1.4  - of note,  patient is unaware of a history of kidney disease, however he has had long standing uncontrolled hypertension which could be a culprit.  -Outpatient follow-up with primary care        Diet: Low Saturated Fat Na <2400 mg    DVT Prophylaxis: Pneumatic Compression Devices  Gifford Catheter: Not present  Lines: None     Cardiac Monitoring: ACTIVE order. Indication: Post- PCI/Angiogram (24 hours)  Code Status: Full Code          Clinically Significant Risk Factors                               # Obesity: Estimated body mass index is 30.61 kg/m  as calculated from the following:    Height as of this encounter: 1.727 m (5' 8\").    Weight as of this encounter: 91.3 kg (201 lb 4.8 oz)., PRESENT ON ADMISSION                   Social Drivers of Health       Tobacco Use: Medium Risk (3/25/2025)     Patient History      Smoking Tobacco Use: Former      Smokeless Tobacco Use: Unknown               Disposition Plan     Medically Ready for Discharge:     Consultations This Hospital Stay   CARDIOLOGY IP CONSULT  PHARMACY IP CONSULT  HOSPITALIST IP CONSULT  NUTRITION SERVICES ADULT IP CONSULT  CARDIAC REHAB IP CONSULT  SMOKING CESSATION PROGRAM IP CONSULT    Code Status   Full Code    Time Spent on this Encounter   I, Jason Ayoub MD, personally saw the patient today and spent greater than 30 minutes discharging this patient.       Jason Ayoub MD  [facility]  [address] [place] [address] AVE, [address] [address]  Phone: " [phone]  ______________________________________________________________________    Physical Exam   Vital Signs: Temp: 97.4  F (36.3  C) Temp src: Oral BP: (!) 141/99 Pulse: 102   Resp: 16 SpO2: 95 % O2 Device: None (Room air)    Weight: 200 lbs 11.2 oz         Primary Care Physician   Caitlyn Clements    Discharge Orders      Cardiac Rehab  Referral      Reason Lipid Lowering Medications not prescribed from this order set    Already ordered from last heart cath     Reason aspirin not prescribed from this order set    Already ordered from last heart cath     Reason for your hospital stay    Admit with NSTEMI and underwent stenting of LAD and Dx     Activity    Your activity upon discharge: activity as tolerated     Follow Up    Follow up with cardiology as directed in a few weeks    Follow up with PCP in 1 month     Diet    Follow this diet upon discharge: Current Diet:Orders Placed This Encounter      Low Saturated Fat Na <2400 mg       Significant Results and Procedures   Most Recent 3 CBC's:  Recent Labs   Lab Test 03/28/25  1609 03/26/25  1122 03/26/25  0620   WBC 10.1 9.7 10.0   HGB 13.1* 16.0 14.9   MCV 89 87 88    282 239     Most Recent 3 BMP's:  Recent Labs   Lab Test 03/29/25  1448 03/28/25  0609 03/27/25  0730    135 137   POTASSIUM 4.2 4.4 4.9   CHLORIDE 100 103 104   CO2 23 23 21*   BUN 23.6* 23.5* 27.4*   CR 1.34* 1.37* 1.41*   ANIONGAP 13 9 12   PATRIA 9.6 9.2 9.1   * 104* 116*   ,   Results for orders placed or performed during the hospital encounter of 03/25/25   XR Chest 2 Views    Narrative    EXAM: XR CHEST 2 VIEWS  LOCATION: [facility]  DATE: 3/25/2025    INDICATION: cp  COMPARISON: None.      Impression    IMPRESSION: Negative chest.   US Lower Extremity Venous Duplex Left    Narrative    EXAM: US LOWER EXTREMITY VENOUS DUPLEX LEFT  LOCATION: [facility]  DATE: 3/25/2025    INDICATION: Left leg pain and  swelling.  COMPARISON: None.  TECHNIQUE: Venous Duplex ultrasound of the left lower extremity with and without compression, augmentation and duplex. Color flow and spectral Doppler with waveform analysis performed.    FINDINGS: Exam includes the common femoral, femoral, popliteal, and contralateral common femoral veins as well as segmentally visualized deep calf veins and greater saphenous vein.     LEFT: No deep vein thrombosis. No superficial thrombophlebitis. No popliteal cyst.      Impression    IMPRESSION:  No deep venous thrombosis in the left lower extremity.   Echocardiogram Limited    Narrative    [id number]  [id number]  [id number]  [id number]^COMPA^RICK     [facility]  Echocardiography Laboratory  [address] [place] [id number]     Name: EDUARDO BENDER  MRN: [id number]  : 1960  Study Date: 2025 10:10 AM  Age: 64 yrs  Gender: Male  Patient Location: [facility]  Reason For Study: Chest Pain  Ordering Physician: RICK CHATMAN  Referring Physician: Caitlyn Clements  Performed By: Lawrence Joyce     BSA: 2.0 m2  Height: 68 in  Weight: 200 lb  HR: 101  BP: 126/88 mmHg  ______________________________________________________________________________  Procedure  Limited Echocardiogram with two-dimensional, color and spectral Doppler.  Definity (NDC #21072-205) given intravenously.  ______________________________________________________________________________  Interpretation Summary     Contrast was used without apparent complications. Normal transthoracic  echocardiogram. Compared to prior study, there is no significant change.  ______________________________________________________________________________  Left Ventricle  The left ventricle is normal in size. There is normal left ventricular wall  thickness. Left ventricular systolic function is normal. Normal left  ventricular wall motion. No evidence of left ventricular mass or tumors.     Right Ventricle  The right  ventricle is normal in structure, function and size.     Atria  Normal left atrial size. Right atrial size is normal.     Mitral Valve  The mitral valve leaflets appear normal. There is no evidence of stenosis,  fluttering, or prolapse.     Tricuspid Valve  Normal tricuspid valve.     Aortic Valve  Normal tricuspid aortic valve.     Pulmonic Valve  Normal pulmonic valve.     Vessels  The aortic root is normal size. Normal size ascending aorta.     Pericardium  The pericardium appears normal.     ______________________________________________________________________________  MMode/2D Measurements & Calculations  IVSd: 1.0 cm  LVIDd: 4.3 cm  LVIDs: 2.9 cm  LVPWd: 1.2 cm  FS: 31.8 %  LV mass(C)d: 167.3 grams  LV mass(C)dI: 81.9 grams/m2     Ao root diam: 3.1 cm  asc Aorta Diam: 3.6 cm  LVOT diam: 2.2 cm  LVOT area: 3.9 cm2  RVOT diam: 1.8 cm  Ao root diam index Ht(cm/m): 1.8  Ao root diam index BSA (cm/m2): 1.5  Asc Ao diam index BSA (cm/m2): 1.7  Asc Ao diam index Ht(cm/m): 2.1  RWT: 0.58     ______________________________________________________________________________  Report approved by: Riky Biswas MD on 03/26/2025 11:53 AM         Cardiac Catheterization    Narrative      Mid Cx lesion is 35% stenosed.    Prox LAD lesion is 40% stenosed.    2nd Diag lesion is 80% stenosed.    Mid RCA lesion is 35% stenosed.    RV Branch lesion is 90% stenosed.    Prox RCA to Mid RCA lesion is 35% stenosed.    Pt with chest pain  10 days post Anterior MI and  s/p stenting mid LAD.    Unable to localize source of chest pain (possible Dressler's, Diag, RV   branch) Pt now with acute on chronic renal insufficiency which limits dye   load.  I discussed the case with Dr Colón, initial suggestion was   ongoing medical Rx but pt has had 2 episodes of chest pain within last 48   hours including an RRT event this AM.  Given that the diagonal may cover a   larger territory elected to repair Diag today.  This diag exits the Lad   through  "the LAD stent, there is mild/mod ostial Diag disease and then   severe disease in mid Diag.  Given limited dye load option I ballooned (NC   and cutting balloon) the mid Diag and tried to avoid repairing the ostial   diagonal since it had a \"V\" take off not a \"T\" take off which might have   required converting to a bifurcation stenting of Lad/D-this increasing   chance of restenosis and increased dye load.  Successfully stented mid   Diag taking vessel 80-85% down to 0% mid diagonal and the ostial diagonal   has moderate disease.  Patient will need to be watched for recurrent angina, in interim I have   increased Toprol and added amlodipine for anti-anginal and anti-HTN   effect.  Watch for Lad/D restenosis or progression of ostial diag lesion.    The RV branch could be intervened on if necessary but it is a smaller   system   (not done today due to renal insufficiency and dye load)         Discharge Medications   Current Discharge Medication List        START taking these medications    Details   amLODIPine (NORVASC) 5 MG tablet Take 1 tablet (5 mg) by mouth at bedtime.  Qty: 30 tablet, Refills: 0    Associated Diagnoses: NSTEMI (non-ST elevated myocardial infarction) (H)      isosorbide mononitrate (IMDUR) 30 MG 24 hr tablet Take 1 tablet (30 mg) by mouth daily.  Qty: 30 tablet, Refills: 0    Associated Diagnoses: NSTEMI (non-ST elevated myocardial infarction) (H)           CONTINUE these medications which have CHANGED    Details   metoprolol succinate ER (TOPROL XL) 50 MG 24 hr tablet Take 1 tablet (50 mg) by mouth daily.  Qty: 30 tablet, Refills: 0    Associated Diagnoses: NSTEMI (non-ST elevated myocardial infarction) (H)           CONTINUE these medications which have NOT CHANGED    Details   aspirin (ASA) 81 MG chewable tablet Take 1 tablet (81 mg) by mouth daily. Starting tomorrow.  Qty: 30 tablet, Refills: 3    Associated Diagnoses: NSTEMI (non-ST elevated myocardial infarction) (H)      atorvastatin " (LIPITOR) 40 MG tablet Take 1 tablet (40 mg) by mouth daily.  Qty: 90 tablet, Refills: 3    Associated Diagnoses: NSTEMI (non-ST elevated myocardial infarction) (H)      lisinopril (ZESTRIL) 10 MG tablet TAKE ONE TABLET BY MOUTH ONCE DAILY  Qty: 30 tablet, Refills: 1    Associated Diagnoses: Benign essential hypertension      nitroGLYcerin (NITROSTAT) 0.4 MG sublingual tablet For chest pain place 1 tablet under the tongue every 5 minutes for 3 doses. If symptoms persist 5 minutes after 1st dose call 911.  Qty: 30 tablet, Refills: 0    Associated Diagnoses: NSTEMI (non-ST elevated myocardial infarction) (H)      ticagrelor (BRILINTA) 90 MG tablet Take 1 tablet (90 mg) by mouth 2 times daily. Dose to start this evening.  Qty: 180 tablet, Refills: 3    Associated Diagnoses: NSTEMI (non-ST elevated myocardial infarction) (H)           Allergies   No Known Allergies

## 2025-03-30 NOTE — PLAN OF CARE
Goal Outcome Evaluation:    Orientation: A&OX4    Vitals/Tele: vss on ra ex: HTN at times     IV Access/drains: Piv sl     Diet: 2g na    Mobility: ind     GI/: continent     Wound/Skin: scattered bruising     Discharge Plan: pending     See Flow sheets for assessment      Observation goals  PRIOR TO DISCHARGE       Comments: -diagnostic tests and consults completed and resulted: Not Met   -vital signs normal or at patient baseline: Not Met   -tolerating oral intake to maintain hydration: Met   -adequate pain control on oral analgesics: Met   -returns to baseline functional status: Met   -safe disposition plan has been identified: Met   Nurse to notify provider when observation goals have been met and patient is ready for discharge.

## 2025-03-31 ENCOUNTER — HOSPITAL ENCOUNTER (INPATIENT)
Facility: CLINIC | Age: 65
LOS: 1 days | Discharge: HOME | End: 2025-04-02
Attending: EMERGENCY MEDICINE | Admitting: STUDENT IN AN ORGANIZED HEALTH CARE EDUCATION/TRAINING PROGRAM
Payer: COMMERCIAL

## 2025-03-31 ENCOUNTER — APPOINTMENT (OUTPATIENT)
Dept: PHYSICAL THERAPY | Facility: CLINIC | Age: 65
End: 2025-03-31
Attending: PHYSICIAN ASSISTANT
Payer: COMMERCIAL

## 2025-03-31 DIAGNOSIS — N18.31 ACUTE RENAL FAILURE SUPERIMPOSED ON STAGE 3A CHRONIC KIDNEY DISEASE, UNSPECIFIED ACUTE RENAL FAILURE TYPE (H): ICD-10-CM

## 2025-03-31 DIAGNOSIS — N17.9 ACUTE RENAL FAILURE SUPERIMPOSED ON STAGE 3A CHRONIC KIDNEY DISEASE, UNSPECIFIED ACUTE RENAL FAILURE TYPE (H): ICD-10-CM

## 2025-03-31 DIAGNOSIS — R79.89 ELEVATED TROPONIN: ICD-10-CM

## 2025-03-31 DIAGNOSIS — R07.9 CHEST PAIN, UNSPECIFIED TYPE: ICD-10-CM

## 2025-03-31 LAB
ALBUMIN SERPL BCG-MCNC: 4.2 G/DL (ref 3.5–5.2)
ALP SERPL-CCNC: 181 U/L (ref 40–150)
ALT SERPL W P-5'-P-CCNC: 36 U/L (ref 0–70)
ANION GAP SERPL CALCULATED.3IONS-SCNC: 12 MMOL/L (ref 7–15)
AST SERPL W P-5'-P-CCNC: 27 U/L (ref 0–45)
ATRIAL RATE - MUSE: 110 BPM
ATRIAL RATE - MUSE: 115 BPM
ATRIAL RATE - MUSE: 92 BPM
BASOPHILS # BLD AUTO: 0.1 10E3/UL (ref 0–0.2)
BASOPHILS NFR BLD AUTO: 1 %
BILIRUB SERPL-MCNC: 1.1 MG/DL
BUN SERPL-MCNC: 25 MG/DL (ref 8–23)
CALCIUM SERPL-MCNC: 9.4 MG/DL (ref 8.8–10.4)
CHLORIDE SERPL-SCNC: 102 MMOL/L (ref 98–107)
CREAT SERPL-MCNC: 1.46 MG/DL (ref 0.67–1.17)
D DIMER PPP FEU-MCNC: 0.59 UG/ML FEU (ref 0–0.5)
DIASTOLIC BLOOD PRESSURE - MUSE: NORMAL MMHG
EGFRCR SERPLBLD CKD-EPI 2021: 54 ML/MIN/1.73M2
EOSINOPHIL # BLD AUTO: 0.4 10E3/UL (ref 0–0.7)
EOSINOPHIL NFR BLD AUTO: 4 %
ERYTHROCYTE [DISTWIDTH] IN BLOOD BY AUTOMATED COUNT: 12 % (ref 10–15)
GLUCOSE SERPL-MCNC: 115 MG/DL (ref 70–99)
HCO3 SERPL-SCNC: 23 MMOL/L (ref 22–29)
HCT VFR BLD AUTO: 38 % (ref 40–53)
HGB BLD-MCNC: 13.9 G/DL (ref 13.3–17.7)
HOLD SPECIMEN: NORMAL
IMM GRANULOCYTES # BLD: 0.1 10E3/UL
IMM GRANULOCYTES NFR BLD: 1 %
INTERPRETATION ECG - MUSE: NORMAL
LYMPHOCYTES # BLD AUTO: 2.4 10E3/UL (ref 0.8–5.3)
LYMPHOCYTES NFR BLD AUTO: 19 %
MCH RBC QN AUTO: 31.8 PG (ref 26.5–33)
MCHC RBC AUTO-ENTMCNC: 36.6 G/DL (ref 31.5–36.5)
MCV RBC AUTO: 87 FL (ref 78–100)
MONOCYTES # BLD AUTO: 1 10E3/UL (ref 0–1.3)
MONOCYTES NFR BLD AUTO: 8 %
NEUTROPHILS # BLD AUTO: 8.3 10E3/UL (ref 1.6–8.3)
NEUTROPHILS NFR BLD AUTO: 68 %
NRBC # BLD AUTO: 0 10E3/UL
NRBC BLD AUTO-RTO: 0 /100
P AXIS - MUSE: 40 DEGREES
P AXIS - MUSE: 53 DEGREES
P AXIS - MUSE: 56 DEGREES
PLATELET # BLD AUTO: 242 10E3/UL (ref 150–450)
POTASSIUM SERPL-SCNC: 3.8 MMOL/L (ref 3.4–5.3)
PR INTERVAL - MUSE: 128 MS
PR INTERVAL - MUSE: 130 MS
PR INTERVAL - MUSE: 130 MS
PROT SERPL-MCNC: 7.7 G/DL (ref 6.4–8.3)
QRS DURATION - MUSE: 76 MS
QRS DURATION - MUSE: 78 MS
QRS DURATION - MUSE: 80 MS
QT - MUSE: 338 MS
QT - MUSE: 350 MS
QT - MUSE: 394 MS
QTC - MUSE: 467 MS
QTC - MUSE: 473 MS
QTC - MUSE: 487 MS
R AXIS - MUSE: 24 DEGREES
R AXIS - MUSE: 30 DEGREES
R AXIS - MUSE: 35 DEGREES
RBC # BLD AUTO: 4.37 10E6/UL (ref 4.4–5.9)
SODIUM SERPL-SCNC: 137 MMOL/L (ref 135–145)
SYSTOLIC BLOOD PRESSURE - MUSE: NORMAL MMHG
T AXIS - MUSE: 106 DEGREES
T AXIS - MUSE: 54 DEGREES
T AXIS - MUSE: 76 DEGREES
TROPONIN T SERPL HS-MCNC: 176 NG/L
TROPONIN T SERPL HS-MCNC: 193 NG/L
VENTRICULAR RATE- MUSE: 110 BPM
VENTRICULAR RATE- MUSE: 115 BPM
VENTRICULAR RATE- MUSE: 92 BPM
WBC # BLD AUTO: 12.3 10E3/UL (ref 4–11)

## 2025-03-31 PROCEDURE — 99223 1ST HOSP IP/OBS HIGH 75: CPT | Mod: FS | Performed by: PHYSICIAN ASSISTANT

## 2025-03-31 PROCEDURE — 250N000013 HC RX MED GY IP 250 OP 250 PS 637: Performed by: INTERNAL MEDICINE

## 2025-03-31 PROCEDURE — 80053 COMPREHEN METABOLIC PANEL: CPT | Performed by: EMERGENCY MEDICINE

## 2025-03-31 PROCEDURE — 99222 1ST HOSP IP/OBS MODERATE 55: CPT | Performed by: INTERNAL MEDICINE

## 2025-03-31 PROCEDURE — 93005 ELECTROCARDIOGRAM TRACING: CPT

## 2025-03-31 PROCEDURE — G0378 HOSPITAL OBSERVATION PER HR: HCPCS

## 2025-03-31 PROCEDURE — 84484 ASSAY OF TROPONIN QUANT: CPT | Performed by: EMERGENCY MEDICINE

## 2025-03-31 PROCEDURE — 36415 COLL VENOUS BLD VENIPUNCTURE: CPT | Performed by: EMERGENCY MEDICINE

## 2025-03-31 PROCEDURE — 97110 THERAPEUTIC EXERCISES: CPT | Mod: GP

## 2025-03-31 PROCEDURE — 85379 FIBRIN DEGRADATION QUANT: CPT | Performed by: INTERNAL MEDICINE

## 2025-03-31 PROCEDURE — 99285 EMERGENCY DEPT VISIT HI MDM: CPT

## 2025-03-31 PROCEDURE — 97530 THERAPEUTIC ACTIVITIES: CPT | Mod: GP

## 2025-03-31 PROCEDURE — 97161 PT EVAL LOW COMPLEX 20 MIN: CPT | Mod: GP

## 2025-03-31 PROCEDURE — 85025 COMPLETE CBC W/AUTO DIFF WBC: CPT | Performed by: EMERGENCY MEDICINE

## 2025-03-31 PROCEDURE — 93005 ELECTROCARDIOGRAM TRACING: CPT | Mod: 76

## 2025-03-31 RX ORDER — HYDRALAZINE HYDROCHLORIDE 20 MG/ML
10 INJECTION INTRAMUSCULAR; INTRAVENOUS EVERY 4 HOURS PRN
Status: DISCONTINUED | OUTPATIENT
Start: 2025-03-31 | End: 2025-04-02 | Stop reason: HOSPADM

## 2025-03-31 RX ORDER — NITROGLYCERIN 0.4 MG/1
0.4 TABLET SUBLINGUAL EVERY 5 MIN PRN
Status: DISCONTINUED | OUTPATIENT
Start: 2025-03-31 | End: 2025-04-02 | Stop reason: HOSPADM

## 2025-03-31 RX ORDER — ONDANSETRON 4 MG/1
4 TABLET, ORALLY DISINTEGRATING ORAL EVERY 6 HOURS PRN
Status: DISCONTINUED | OUTPATIENT
Start: 2025-03-31 | End: 2025-04-02 | Stop reason: HOSPADM

## 2025-03-31 RX ORDER — ASPIRIN 81 MG/1
81 TABLET, CHEWABLE ORAL EVERY EVENING
Status: DISCONTINUED | OUTPATIENT
Start: 2025-03-31 | End: 2025-04-02 | Stop reason: HOSPADM

## 2025-03-31 RX ORDER — AMOXICILLIN 250 MG
2 CAPSULE ORAL 2 TIMES DAILY PRN
Status: DISCONTINUED | OUTPATIENT
Start: 2025-03-31 | End: 2025-04-02 | Stop reason: HOSPADM

## 2025-03-31 RX ORDER — ONDANSETRON 2 MG/ML
4 INJECTION INTRAMUSCULAR; INTRAVENOUS EVERY 6 HOURS PRN
Status: DISCONTINUED | OUTPATIENT
Start: 2025-03-31 | End: 2025-04-02 | Stop reason: HOSPADM

## 2025-03-31 RX ORDER — AMOXICILLIN 250 MG
1 CAPSULE ORAL 2 TIMES DAILY PRN
Status: DISCONTINUED | OUTPATIENT
Start: 2025-03-31 | End: 2025-04-02 | Stop reason: HOSPADM

## 2025-03-31 RX ORDER — HYDRALAZINE HYDROCHLORIDE 10 MG/1
10 TABLET, FILM COATED ORAL EVERY 4 HOURS PRN
Status: DISCONTINUED | OUTPATIENT
Start: 2025-03-31 | End: 2025-04-02 | Stop reason: HOSPADM

## 2025-03-31 RX ORDER — ISOSORBIDE MONONITRATE 30 MG/1
30 TABLET, EXTENDED RELEASE ORAL EVERY EVENING
Status: DISCONTINUED | OUTPATIENT
Start: 2025-03-31 | End: 2025-04-02 | Stop reason: HOSPADM

## 2025-03-31 RX ORDER — PROCHLORPERAZINE MALEATE 10 MG
10 TABLET ORAL EVERY 6 HOURS PRN
Status: DISCONTINUED | OUTPATIENT
Start: 2025-03-31 | End: 2025-04-02 | Stop reason: HOSPADM

## 2025-03-31 RX ORDER — AMLODIPINE BESYLATE 5 MG/1
5 TABLET ORAL AT BEDTIME
Status: DISCONTINUED | OUTPATIENT
Start: 2025-03-31 | End: 2025-04-02 | Stop reason: HOSPADM

## 2025-03-31 RX ORDER — LISINOPRIL 10 MG/1
10 TABLET ORAL EVERY EVENING
Status: DISCONTINUED | OUTPATIENT
Start: 2025-03-31 | End: 2025-04-02 | Stop reason: HOSPADM

## 2025-03-31 RX ADMIN — ISOSORBIDE MONONITRATE 30 MG: 30 TABLET, EXTENDED RELEASE ORAL at 21:10

## 2025-03-31 RX ADMIN — AMLODIPINE BESYLATE 5 MG: 5 TABLET ORAL at 21:11

## 2025-03-31 RX ADMIN — TICAGRELOR 90 MG: 90 TABLET ORAL at 21:10

## 2025-03-31 RX ADMIN — LISINOPRIL 10 MG: 10 TABLET ORAL at 21:11

## 2025-03-31 RX ADMIN — METOPROLOL SUCCINATE 75 MG: 25 TABLET, EXTENDED RELEASE ORAL at 21:10

## 2025-03-31 RX ADMIN — ASPIRIN 81 MG: 81 TABLET, CHEWABLE ORAL at 21:10

## 2025-03-31 RX ADMIN — TICAGRELOR 90 MG: 90 TABLET ORAL at 09:34

## 2025-03-31 ASSESSMENT — ACTIVITIES OF DAILY LIVING (ADL)
ADLS_ACUITY_SCORE: 52
ADLS_ACUITY_SCORE: 29
ADLS_ACUITY_SCORE: 52
ADLS_ACUITY_SCORE: 29
ADLS_ACUITY_SCORE: 52
ADLS_ACUITY_SCORE: 29
ADLS_ACUITY_SCORE: 52
ADLS_ACUITY_SCORE: 52
ADLS_ACUITY_SCORE: 29
ADLS_ACUITY_SCORE: 52
ADLS_ACUITY_SCORE: 29
ADLS_ACUITY_SCORE: 52
ADLS_ACUITY_SCORE: 52
ADLS_ACUITY_SCORE: 29
ADLS_ACUITY_SCORE: 29

## 2025-03-31 NOTE — H&P
[facility]    History and Physical - Hospitalist Service       Date of Admission:  3/31/2025    Assessment & Plan      Fredy Chow is a 64 year old male presenting on 3/31/2025 with chest pain.  He was discharged yesterday after a hospital stay for NSTEMI s/p coronary angiography/stent large diagonal following a NSTEMI 3/17/2025 s/p PCI DILAN x2 to mid-distal LAD.    Chest pain  Recent NSTEMI with DILAN as noted above  Hyperlipidemia:  -Pain resolved now.  Troponin modestly elevated but actually overall lower than other recent values earlier this month.  ED provider spoke to cardiology and they recommend he come in for observation and further consultation.  Will plan initially to have the patient continue his routine cardiac meds including ASA and tecagrelor.  Hold on heparin drip unless further pain, signs of new acute cardiac change.  Will have NPO until cardiology see's in case they wish to proceed with cath later today.    -Otherwise will continue atorvastatin, imdur, metoprolol.  Could consider increasing imdur if further pain.    HTN:  -Continue amlodipine, metoprolol, and lisinopril.  He takes these in the evening so will schedule them to continue like he uses them at home.    CKD likely stage II:  Baseline 1.3-1.4  -Outpatient follow-up with primary care         Medical Decision Making       55 MINUTES SPENT BY ME on the date of service doing chart review, history, exam, documentation & further activities per the note.        PPE Used:  Mask  Diet:  NPO  DVT Prophylaxis: Pneumatic Compression Devices  Gifford Catheter: Not present  Lines: None     Cardiac Monitoring: None  Code Status:  Full Code    Clinically Significant Risk Factors Present on Admission                 # Drug Induced Platelet Defect: home medication list includes an antiplatelet medication   # Hypertension: Home medication list includes antihypertensive(s)           # Obesity: Estimated body mass index is  "30.49 kg/m  as calculated from the following:    Height as of this encounter: 1.727 m (5' 8\").    Weight as of this encounter: 90.9 kg (200 lb 8 oz).              Disposition Plan      Medically Ready for Discharge: Anticipated Tomorrow    Karel Park,   Hospitalist Service  [facility]  Securely message with [employer] (more info)  Text page via [employer] Paging/Directory   ____________________________________________________________________    Chief Complaint   Chest Pain    History is obtained from the patient    History of Present Illness   Fredy Chow is a 64 year old male who presented on 3/31/2025 with CP starting around 5AM and lasting under an hour.  It was a sharp more stabbing pain in the central chest different than his prior NSTEMI pain.  His arms also felt heavy/uncomfortable.  He took a nitroglycerin with possibly some improvement.  By the time he got to the ED the pain was resolving.  He is now pain free.  He feels his normal self currently.  He was discharged yesterday and felt well last night.  He took his usual meds last evening but not yet his AM tecagrelor.  No new smoking or SH change from prior.      Past Medical History    Past Medical History:   Diagnosis Date    Anal condyloma     diagnosed at approx age 26    Genital herpes     Schwannoma     diagnosed at age 32, excision performed   Recent NSTEMI with DILAN as noted above  Hyperlipidemia  HTN    Past Surgical History   Past Surgical History:   Procedure Laterality Date    BACK SURGERY  04/23/2002    CV CORONARY ANGIOGRAM N/A 3/17/2025    Procedure: Coronary Angiogram;  Surgeon: Juan Garrett MD;  Location:  HEART CARDIAC CATH LAB    CV CORONARY ANGIOGRAM N/A 3/27/2025    Procedure: Coronary Angiogram;  Surgeon: Jayden Thomason MD;  Location:  HEART CARDIAC CATH LAB    CV INTRAVASULAR ULTRASOUND N/A 3/17/2025    Procedure: Intravascular Ultrasound;  Surgeon: Juan Garrett MD;  Location:  " HEART CARDIAC CATH LAB    CV PCI N/A 3/17/2025    Procedure: Percutaneous Coronary Intervention;  Surgeon: Juan Garrett MD;  Location:  HEART CARDIAC CATH LAB    CV PCI N/A 3/27/2025    Procedure: Percutaneous Coronary Intervention;  Surgeon: Jayden Thomason MD;  Location:  HEART CARDIAC CATH LAB       Prior to Admission Medications   Prior to Admission Medications   Prescriptions Last Dose Informant Patient Reported? Taking?   amLODIPine (NORVASC) 5 MG tablet 3/30/2025 Evening  No Yes   Sig: Take 1 tablet (5 mg) by mouth at bedtime.   aspirin (ASA) 81 MG chewable tablet 3/30/2025 Evening Self No Yes   Sig: Take 1 tablet (81 mg) by mouth daily. Starting tomorrow.   Patient taking differently: Take 81 mg by mouth every evening. Starting tomorrow.   atorvastatin (LIPITOR) 40 MG tablet 3/30/2025 Evening Self No Yes   Sig: Take 1 tablet (40 mg) by mouth daily.   Patient taking differently: Take 40 mg by mouth every evening.   isosorbide mononitrate (IMDUR) 30 MG 24 hr tablet 3/30/2025 Evening  No Yes   Sig: Take 1 tablet (30 mg) by mouth daily.   lisinopril (ZESTRIL) 10 MG tablet 3/30/2025 Evening Self No Yes   Sig: TAKE ONE TABLET BY MOUTH ONCE DAILY   Patient taking differently: Take 10 mg by mouth every evening.   metoprolol succinate ER (TOPROL XL) 25 MG 24 hr tablet 3/30/2025 Evening  No Yes   Sig: Take 3 tablets (75 mg) by mouth daily.   nitroGLYcerin (NITROSTAT) 0.4 MG sublingual tablet  Self No Yes   Sig: For chest pain place 1 tablet under the tongue every 5 minutes for 3 doses. If symptoms persist 5 minutes after 1st dose call 911.   ticagrelor (BRILINTA) 90 MG tablet 3/30/2025 Evening Self No Yes   Sig: Take 1 tablet (90 mg) by mouth 2 times daily. Dose to start this evening.      Facility-Administered Medications: None      Allergies   No Known Allergies         Physical Exam   Vital Signs: Temp: 98.7  F (37.1  C)   BP: 124/82 Pulse: 86   Resp: 10 SpO2: 97 % O2 Device: None (Room air)    Weight:  200 lbs 8 oz    GEN:  Alert, oriented x 3, appears comfortable, no overt distress.  HEENT:  Normocephalic/atraumatic, no scleral icterus, no nasal discharge, mouth moist.  CV:  Regular rate and rhythm, no loud murmurs/rubs.  LUNGS:  Clear to auscultation bilaterally without rales/rhonchi/wheezing/retractions.  Symmetric chest rise on inhalation noted.  ABD:  Active bowel sounds, soft, non-tender/non-distended.  No rebound/guarding/rigidity.  EXT:  No edema.  No cyanosis.  No acute joint synovitis noted.  SKIN:  Dry to touch, no exanthems noted in the visualized areas.  NEURO:  Moving ext well on general exam, sensation to touch grossly intact.  No new focal deficits appreciated.     Data     I have personally reviewed the following data over the past 24 hrs:    12.3 (H)  \   13.9   / 242     137 102 25.0 (H) /  115 (H)   3.8 23 1.46 (H) \     ALT: 36 AST: 27 AP: 181 (H) TBILI: 1.1   ALB: 4.2 TOT PROTEIN: 7.7 LIPASE: N/A     Trop: 176 (HH) BNP: N/A       Of note recent trop > 200 a week ago with start of other stay.    No results found for this or any previous visit (from the past 24 hours).

## 2025-03-31 NOTE — PLAN OF CARE
Goal Outcome Evaluation:      Plan of Care Reviewed With: patient    Patient condition improving. Denies chest pain. Sinus Tachycardia. Alert and Oriented x 3. Independent. Able to void, and last BM prior to admission.

## 2025-03-31 NOTE — CONSULTS
"  [facility] Cardiology Consultation     Date of Admission:  3/31/2025  Date of Consult: 03/31/25  Requesting Physician: Dr. Park  Primary care physician: Caitlyn Clements  Primary cardiologist: Will be Dr. Mckay  Staff Cardiologist:  Dr. Nieto     Reason for consult: Chest pain    Assessment:   Fredy Chow is a 64 year old male with a pertinent history of recent NSTEMI with PCI to the mid-distal LAD on 3/17/2025, returning to the hospital with recurrent chest pain and undergoing PCI of the ostial D2 on 3/27, with known residual moderate RCA branch lesion in a small system, who returns to the hospital again 1 day after discharge with atypical chest pain (which sounds consistent with \"stretch pain\"), and bilateral arm paresthesias which has spontaneously improved (with no clear response to SL NTG), downtrending troponins and fairly stable EKG.  Given the risk of worsening renal dysfunction and unclear symptomatic benefit of another angiogram with R branch PCI, I would recommend we first have him work with cardiac rehab and monitor him here overnight with serial EKG's. If he does well, could potentially discharge home again tomorrow. With recurrent symptoms, consider angiogram/PCI tomorrow. Patient is very comfortable with this plan. Will ask medicine team to further assess his arm paresthesias as well.     Continue aspirin, Brilinta, statin, Imdur, amlodipine, metoprolol therapy.    Thank you very much for this consultation.     Dori Kohler PA-C  [facility]  Vocera page  ________________________________________________________________________  History of Present Illness   Fredy Chow is a 64 year old male with a pertinent history of hypertension, CKD, HLP, and CAD with NSTEMI undergoing PCI to the mid-distal LAD on 3/17/2025, noted to have residual severe disease of a large diag and moderate RCA disease and possibly structural lesions in his " smallish RPL RV marginal branches at the time, who returned to the hospital shortly after discharge with diaphoresis and chest pain undergoing repeat coronary angiogram 3/27 during which he underwent successful stenting of his 80% D2 although it was unclear if this was actually responsible for his pain.  The RV branch was left alone due to small system, renal insufficiency and dye load.  Antianginal therapy was advanced.  He was discharged from the hospital yesterday and returns again with recurrent chest pain. EKG shows sinus tachycardia with anterior ST abnormality with some more TWI in V3 otherwise stable.  Initial troponin up slightly from prior, 177 (pre-stent)-193, now back down to 176.  Creatinine up slightly at 1.46.     Patient tells me he woke from sleep last night with sharp central chest discomfort that felt like twinges, brief, but he also noticed that his bilateral arms felt numb, which scared him.  He got up and talk to his roommate, who suggested he take a sublingual nitroglycerin.  He did, and did not notice any immediate change, so they got in the car to go to the ER.  By the time he got here, he was feeling significantly better.  He does still feel may be a little paresthesia in his right arm at this time.  No other focal neuro deficits.  No associated nausea, diaphoresis, or jaw fatigue, which is how his initial NSTEMI presented.  Denies shortness of breath, palpitations.  Currently normotensive with heart rate in the 90s.    Limited echo from 3/27/2025 stable from prior, showing normal biventricular function and no significant VHD, no pericardial effusion.    Code Status    Prior    Past Medical History   I have reviewed this patient's medical history and updated it with pertinent information if needed.   Past Medical History:   Diagnosis Date    Anal condyloma     diagnosed at approx age 26    Genital herpes     Schwannoma     diagnosed at age 32, excision performed       Past Surgical History    I have reviewed this patient's surgical history and updated it with pertinent information if needed.  Past Surgical History:   Procedure Laterality Date    BACK SURGERY  04/23/2002    CV CORONARY ANGIOGRAM N/A 3/17/2025    Procedure: Coronary Angiogram;  Surgeon: Juan Garrett MD;  Location: [facility] CARDIAC CATH LAB    CV CORONARY ANGIOGRAM N/A 3/27/2025    Procedure: Coronary Angiogram;  Surgeon: Jayden Thomason MD;  Location: [facility] CARDIAC CATH LAB    CV INTRAVASULAR ULTRASOUND N/A 3/17/2025    Procedure: Intravascular Ultrasound;  Surgeon: Juan Garrett MD;  Location: [facility] CARDIAC CATH LAB    CV PCI N/A 3/17/2025    Procedure: Percutaneous Coronary Intervention;  Surgeon: Juan Garrett MD;  Location: [facility] CARDIAC CATH LAB    CV PCI N/A 3/27/2025    Procedure: Percutaneous Coronary Intervention;  Surgeon: Jayden Thomason MD;  Location: [facility] CARDIAC CATH LAB       Prior to Admission Medications   Prior to Admission Medications   Prescriptions Last Dose Informant Patient Reported? Taking?   amLODIPine (NORVASC) 5 MG tablet 3/30/2025 Evening  No Yes   Sig: Take 1 tablet (5 mg) by mouth at bedtime.   aspirin (ASA) 81 MG chewable tablet 3/30/2025 Evening Self No Yes   Sig: Take 1 tablet (81 mg) by mouth daily. Starting tomorrow.   Patient taking differently: Take 81 mg by mouth every evening. Starting tomorrow.   atorvastatin (LIPITOR) 40 MG tablet 3/30/2025 Evening Self No Yes   Sig: Take 1 tablet (40 mg) by mouth daily.   Patient taking differently: Take 40 mg by mouth every evening.   isosorbide mononitrate (IMDUR) 30 MG 24 hr tablet 3/30/2025 Evening  No Yes   Sig: Take 1 tablet (30 mg) by mouth daily.   lisinopril (ZESTRIL) 10 MG tablet 3/30/2025 Evening Self No Yes   Sig: TAKE ONE TABLET BY MOUTH ONCE DAILY   Patient taking differently: Take 10 mg by mouth every evening.   metoprolol succinate ER (TOPROL XL) 25 MG 24 hr tablet 3/30/2025 Evening  No Yes   Sig: Take 3 tablets (75  mg) by mouth daily.   nitroGLYcerin (NITROSTAT) 0.4 MG sublingual tablet  Self No Yes   Sig: For chest pain place 1 tablet under the tongue every 5 minutes for 3 doses. If symptoms persist 5 minutes after 1st dose call 911.   ticagrelor (BRILINTA) 90 MG tablet 3/30/2025 Evening Self No Yes   Sig: Take 1 tablet (90 mg) by mouth 2 times daily. Dose to start this evening.      Facility-Administered Medications: None     Allergies   No Known Allergies    Social History   I have reviewed this patient's social history and updated it with pertinent information if needed. Fredy Chow  reports that he quit smoking about 38 years ago. His smoking use included cigarettes. He does not have any smokeless tobacco history on file. He reports current alcohol use. He reports that he does not use drugs.    Family History   I have reviewed this patient's family history and updated it with pertinent information if needed.   Family History   Problem Relation Age of Onset    Hypertension Mother     Heart Failure Father     Hypertension Father     Ovarian Cancer Sister     Cerebrovascular Disease Maternal Grandmother     Diabetes Paternal Grandfather     Colon Cancer No family hx of        Review of Systems   A comprehensive review of systems is negative, except for as noted in the HPI.    Physical Exam   Temp: 98.7  F (37.1  C)   BP: 120/89 Pulse: 93   Resp: 10 SpO2: 97 % O2 Device: None (Room air)    Vital Signs with Ranges  Temp:  [98.7  F (37.1  C)] 98.7  F (37.1  C)  Pulse:  [] 93  Resp:  [10-25] 10  BP: (106-143)/(69-89) 120/89  SpO2:  [95 %-100 %] 97 %  200 lbs 8 oz    General - Alert and oriented to time place and person in no acute distress  Eyes - No scleral icterus  HEENT - Neck supple, moist mucous membranes  Cardiovascular - RRR, no murmurs.  Extremities - There is no edema  Respiratory - CTAB  Skin - No pallor or cyanosis  Gastrointestinal - Non tender and non distended without rebound or guarding  Psych -  "Appropriate affect   Neurological - No gross motor neurological focal deficits    Data   Most Recent 3 CBC's:  Recent Labs   Lab Test 03/31/25  0551 03/28/25  1609 03/26/25  1122   WBC 12.3* 10.1 9.7   HGB 13.9 13.1* 16.0   MCV 87 89 87    222 282     Most Recent 3 BMP's:  Recent Labs   Lab Test 03/31/25  0551 03/29/25  1448 03/28/25  0609    136 135   POTASSIUM 3.8 4.2 4.4   CHLORIDE 102 100 103   CO2 23 23 23   BUN 25.0* 23.6* 23.5*   CR 1.46* 1.34* 1.37*   ANIONGAP 12 13 9   PATRIA 9.4 9.6 9.2   * 168* 104*     Most Recent Cholesterol Panel:No lab results found.  Most Recent TSH and T4:No lab results found.  Most Recent Hemoglobin A1c:No lab results found.      Clinically Significant Risk Factors Present on Admission                 # Drug Induced Platelet Defect: home medication list includes an antiplatelet medication   # Hypertension: Home medication list includes antihypertensive(s)           # Obesity: Estimated body mass index is 30.49 kg/m  as calculated from the following:    Height as of this encounter: 1.727 m (5' 8\").    Weight as of this encounter: 90.9 kg (200 lb 8 oz).         "

## 2025-03-31 NOTE — PROGRESS NOTES
RECEIVING UNIT ED HANDOFF REVIEW    ED Nurse Handoff Report was reviewed by: Andriy Ferguson RN on March 31, 2025 at 2:47 PM

## 2025-03-31 NOTE — Clinical Note
Hemodynamic equipment used: 5 lead ECG, [employer] With 3 Leads, Machine BP Cuff and pulse oximeter probe.

## 2025-03-31 NOTE — ED TRIAGE NOTES
Pt presents with chest pain starting this morning. Pt discharged from hospital yesterday for Nstemi.      Triage Assessment (Adult)       Row Name 03/31/25 0536 03/31/25 0535       Triage Assessment    Airway WDL -- WDL       Cardiac WDL    Cardiac WDL chest pain --       Chest Pain Assessment    Chest Pain Location midsternal --       Peripheral/Neurovascular WDL    Peripheral Neurovascular WDL -- WDL       Cognitive/Neuro/Behavioral WDL    Cognitive/Neuro/Behavioral WDL -- WDL

## 2025-03-31 NOTE — PHARMACY-ADMISSION MEDICATION HISTORY
Pharmacist Admission Medication History    Admission medication history is complete. The information provided in this note is only as accurate as the sources available at the time of the update.    Information Source(s): Patient, CareEverywhere/SureScripts, and recent discharge  via in-person    Pertinent Information:   Pt was discharged on 3/30 and received Imdur 30mg and Toprol 25mg in the morning.  He also took all of his medications yesterday evening, therefore receiving another Imdur 30mg and Toprol 75mg.    Changes made to PTA medication list:  Added: None  Deleted: None  Changed: None    Allergies reviewed with patient and updates made in EHR: no    Medication History Completed By: Ashlee Monreal RP 3/31/2025 8:06 AM    PTA Med List   Medication Sig Last Dose/Taking    amLODIPine (NORVASC) 5 MG tablet Take 1 tablet (5 mg) by mouth at bedtime. 3/30/2025 Evening    aspirin (ASA) 81 MG chewable tablet Take 1 tablet (81 mg) by mouth daily. Starting tomorrow. (Patient taking differently: Take 81 mg by mouth every evening. Starting tomorrow.) 3/30/2025 Evening    atorvastatin (LIPITOR) 40 MG tablet Take 1 tablet (40 mg) by mouth daily. (Patient taking differently: Take 40 mg by mouth every evening.) 3/30/2025 Evening    isosorbide mononitrate (IMDUR) 30 MG 24 hr tablet Take 1 tablet (30 mg) by mouth daily. 3/30/2025 Evening    lisinopril (ZESTRIL) 10 MG tablet TAKE ONE TABLET BY MOUTH ONCE DAILY (Patient taking differently: Take 10 mg by mouth every evening.) 3/30/2025 Evening    metoprolol succinate ER (TOPROL XL) 25 MG 24 hr tablet Take 3 tablets (75 mg) by mouth daily. 3/30/2025 Evening    nitroGLYcerin (NITROSTAT) 0.4 MG sublingual tablet For chest pain place 1 tablet under the tongue every 5 minutes for 3 doses. If symptoms persist 5 minutes after 1st dose call 911. Taking    ticagrelor (BRILINTA) 90 MG tablet Take 1 tablet (90 mg) by mouth 2 times daily. Dose to start this evening. 3/30/2025 Evening

## 2025-03-31 NOTE — PROGRESS NOTES
OBSERVATION GOALS    -diagnostic tests and consults completed and resulted: not met, needs cardiac rehab assessment    -vital signs normal or at patient baseline: met    -adequate pain control on oral analgesics: met    -cardiology assessment: met

## 2025-03-31 NOTE — PROGRESS NOTES
"   03/31/25 1343   Appointment Info   Signing Clinician's Name / Credentials (PT) Karel Haynes DPT   Living Environment   People in Home friend(s)   Current Living Arrangements house   Home Accessibility stairs within home   Number of Stairs, Within Home, Primary ten   Stair Railings, Within Home, Primary railings safe and in good condition   Transportation Anticipated family or friend will provide;public transportation   Living Environment Comments Pt rents the basement of his friend's house. Reports his friend is retired and is home during the day   Self-Care   Usual Activity Tolerance good   Current Activity Tolerance good   Regular Exercise Yes   Activity/Exercise Type walking   Exercise Amount/Frequency daily   Equipment Currently Used at Home none   Fall history within last six months no   Activity/Exercise/Self-Care Comment Pt IND at baseline, works at [employer] at the [employer]. Pt reports he walks 15-20 minutes every day to the bus. Pt reports he has had one Outpatient CR appointment, had plans to go to 2nd appointment today, but canceled after coming to ED.   General Information   Onset of Illness/Injury or Date of Surgery 03/31/25   Referring Physician Dori Kohler PA-C   Pertinent History of Current Problem (include personal factors and/or comorbidities that impact the POC) Pt is 65 yo male who, per chart, \"presenting on 3/31/2025 with chest pain.  He was discharged yesterday after a hospital stay for NSTEMI s/p coronary angiography/stent large diagonal following a NSTEMI 3/17/2025 s/p PCI DILAN x2 to mid-distal LAD.\"   Existing Precautions/Restrictions cardiac   Cognition   Affect/Mental Status (Cognition) WFL   Orientation Status (Cognition) oriented x 4   Follows Commands (Cognition) WFL   Pain Assessment   Patient Currently in Pain No   Integumentary/Edema   Integumentary/Edema Comments bruising to R wrist, L forearm and anticubital area.   Posture    Posture Forward head position "   Range of Motion (ROM)   Range of Motion ROM is WFL   Strength (Manual Muscle Testing)   Strength (Manual Muscle Testing) strength is WFL   Bed Mobility   Comment, (Bed Mobility) IND, sitting EOB upon PT arrival.   Transfers   Comment, (Transfers) SBA, no AD for STS   Gait/Stairs (Locomotion)   Comment, (Gait/Stairs) 20' w/o AD, SBA, mild lateral say and gait deviations noted, but no overt LOB. Pt wearing slippers which he reports might affect his gait a little.   Balance   Balance Comments mild lateral sway and deviations with gait, no overt LOB.   Sensory Examination   Sensory Perception Comments screened light touch of B UEs, intact and able to identify all touch during, states equal to both sides.   Clinical Impression   Criteria for Skilled Therapeutic Intervention Yes, treatment indicated   PT Diagnosis (PT) impaired functional mobility   Influenced by the following impairments decresed activity tolerance   Functional limitations due to impairments limited ambulation due to poor activity tolerance   Clinical Presentation (PT Evaluation Complexity) stable   Clinical Presentation Rationale VSS, steady with mobility, PMH   Clinical Decision Making (Complexity) low complexity   Planned Therapy Interventions (PT) balance training;bed mobility training;gait training;home exercise program;patient/family education;ROM (range of motion);stair training;strengthening;transfer training;progressive activity/exercise   Risk & Benefits of therapy have been explained evaluation/treatment results reviewed;care plan/treatment goals reviewed;risks/benefits reviewed;current/potential barriers reviewed;participants voiced agreement with care plan;participants included;patient   PT Total Evaluation Time   PT Eval, Low Complexity Minutes (34551) 7   Physical Therapy Goals   PT Frequency Daily   PT Predicted Duration/Target Date for Goal Attainment 04/07/25   PT Goals Bed Mobility;Transfers;Gait;Cardiac Phase 1   PT: Bed Mobility  Independent;Goal Met   PT: Transfers Independent;Goal Met   PT: Understanding of cardiac education to maximize quality of life, condition management, and health outcomes Patient;Verbalize;Demonstrate   PT: Perform aerobic activity with stable cardiovascular response continuous;10 minutes;treadmill   PT: Functional/aerobic ambulation tolerance with stable cardiovascular response in order to return to home and community environment Independent;Greater than 300 feet   PT: Navigation of stairs simulating home set up with stable cardiovascular response in order to return to home and community environment Modified independent;Greater than 10 stairs;Rail on right   Therapeutic Procedure/Exercise   Ther. Procedure: strength, endurance, ROM, flexibillity Minutes (37784) 11   Symptoms Noted During/After Treatment none   Treatment Time Includes (CR Only) See specific exercise details intervention group(s);Monitoring of vital signs (see vital signs flowsheet for details)   Therapeutic Activity   Therapeutic Activities: dynamic activities to improve functional performance Minutes (66013) 8   Symptoms Noted During/After Treatment None   Treatment Detail/Skilled Intervention Pt sitting EOB upon PT arrival, eating. Pt reports this is his 3rd hospital visit (surgery, returned due to chest pain, returning again due to B arm numbness and chest pain again). Most of these symptoms have resolved per pt report. Pt performed STS x2 during session, no AD, SBA, progressed to IND. Pt educated on plan for ongoing OP CR as able and to continue with HEP (walking program). No questions from pt about this at this time. Pt sitting at EOB at end of session, all needs within reach.   Ambulation   Workload flat surface, hallway   Duration (minutes) 7 minutes   Effort Scale   (did not rate on this date.)   Symptoms Denies symptoms   Cardiovascular Response Tachycardia   Exercise Details Pt ambulated ~250' in total, no AD, SBA througout. Mild gait  deviations but no overt LOB. Pt's HR around 105-110 prior to mobility, spot checked and at 112 after ambulation. BPs stable (see VSFS).   Vital Signs Details stable, mild tachycardia, see VSFS   Cardiac Education   Education Provided Outpatient Cardiac Rehab;Risk factors;Home exercise program   Education Packet Given to Patient No  (pt has previously been here and received handouts, check with pt tomorrow if needing any additional copies, has already began OP CR)   All Patient Education Handouts Reviewed with Patient and/or Family No   PT Discharge Planning   PT Plan progress ambulation distance and review stairs, review education on movement of the entry sites, discuss with pt needs for additional handouts (or if he has all of them since he's been here several times since surgery).   PT Discharge Recommendation (DC Rec) home with outpatient cardiac rehab   PT Rationale for DC Rec Pt is near baseline, mild deficits with strength and balance noted during ambulation which pt attributes to being in/out of hospital. Pt able to ambulate 250' w/o AD and SBA. HR tachy around 110 pre- and post-ambulation. BP stable (See VSFS). Screened light touch sensation which was mostly resolved per screen. Recommending continuation of HEP ambulation program, and to continue with OP CR.   PT Brief overview of current status SBA, no AD; Goals of therapy will be to address safe mobility and make recs for d/c to next level of care. Pt and RN will continue to follow all falls risk precautions as documented by RN staff while hospitalized.   PT Total Distance Amb During Session (feet) 250   Physical Therapy Time and Intention   Timed Code Treatment Minutes 19   Total Session Time (sum of timed and untimed services) 26

## 2025-03-31 NOTE — ED NOTES
"[facility]  ED Nurse Handoff Report    ED Chief complaint: Chest Pain      ED Diagnosis: Chest pain, unspecified type; elevated troponin    Code Status: Full Code    Allergies: No Known Allergies    Patient Story: Patient just discharged from hospital yesterday.  Admission for NSTEMI with 3 stents placed.    Patient reported waking up this morning at 0500 with heavy chest pain along with bilateral arm pain and numbness. Described pain as \"puncture\" to the left chest.    Focused Assessment:  Patient reports chest pain and bilateral arm numbness upon waking up at 0500 this morning. Took 1 nitroglycerin at home with relief from chest pain. Denies chest pain or discomfort at this time. Denies SOB, N/V, or diaphoresis. Skin warm and dry. A/Ox4.    Treatments and/or interventions provided:   Labs Ordered and Resulted from Time of ED Arrival to Time of ED Departure   COMPREHENSIVE METABOLIC PANEL - Abnormal       Result Value    Sodium 137      Potassium 3.8      Carbon Dioxide (CO2) 23      Anion Gap 12      Urea Nitrogen 25.0 (*)     Creatinine 1.46 (*)     GFR Estimate 54 (*)     Calcium 9.4      Chloride 102      Glucose 115 (*)     Alkaline Phosphatase 181 (*)     AST 27      ALT 36      Protein Total 7.7      Albumin 4.2      Bilirubin Total 1.1     TROPONIN T, HIGH SENSITIVITY - Abnormal    Troponin T, High Sensitivity 193 (*)    CBC WITH PLATELETS AND DIFFERENTIAL - Abnormal    WBC Count 12.3 (*)     RBC Count 4.37 (*)     Hemoglobin 13.9      Hematocrit 38.0 (*)     MCV 87      MCH 31.8      MCHC 36.6 (*)     RDW 12.0      Platelet Count 242      % Neutrophils 68      % Lymphocytes 19      % Monocytes 8      % Eosinophils 4      % Basophils 1      % Immature Granulocytes 1      NRBCs per 100 WBC 0      Absolute Neutrophils 8.3      Absolute Lymphocytes 2.4      Absolute Monocytes 1.0      Absolute Eosinophils 0.4      Absolute Basophils 0.1      Absolute Immature Granulocytes 0.1      Absolute " "NRBCs 0.0     TROPONIN T, HIGH SENSITIVITY - Abnormal    Troponin T, High Sensitivity 176 (*)      Patient's response to treatments and/or interventions: Tolerated    To be done/followed up on inpatient unit:  See inpatient orders    Does this patient have any cognitive concerns?:  Alert & oriented x4    Activity level - Baseline/Home:  Independent  Activity Level - Current:   Stand with Assist    Patient's Preferred language: English   Needed?: No    Isolation: None  Infection: Not Applicable  Patient tested for COVID 19 prior to admission: NO  Bariatric?: No    Vital Signs:   Vitals:    03/31/25 0545 03/31/25 0600   BP: 111/78 107/71   Pulse: 110 101   Resp: 18 12   Temp: 98.7  F (37.1  C)    SpO2: 100% 97%   Weight: 90.9 kg (200 lb 8 oz)    Height: 1.727 m (5' 8\")        Cardiac Rhythm: NSR    Was the PSS-3 completed:   Yes  What interventions are required if any?               Family Comments: S/o at bedside  OBS brochure/video discussed/provided to patient/family: No              Name of person given brochure if not patient:               Relationship to patient:     For the majority of the shift this patient's behavior was Green.   Behavioral interventions performed were .    ED NURSE PHONE NUMBER:  *[phone]         "

## 2025-03-31 NOTE — ED PROVIDER NOTES
"  Emergency Department Note      History of Present Illness     Chief Complaint   Chest Pain      HPI   Fredy Chow is a 64 year old male with a history of NSTEMI who presents to the ED for chest pain. The patient states that he recently had 3 stents placed for an NSTEMI this month. His partner adds that he initially had 2 stents placed during his first hospitalization and then required a third stent to be placed after persistent chest pain. While hospitalized he had some issues with his BP, but was eventually denis to be discharged. Patient reports waking up today around 0500 with heavy chest pain as well as bilateral arm pain and numbness. He describes this pain as a \"puncture\" to the mid left chest. Patient denies any radiation of pain to the neck or jaw. Further denies any associated shortness of breath. Denies any nausea or diaphoresis. Patient did take one nitroglycerin before leaving for the ED, and his chest pain was resolved once he arrived here. He does not have a pacemaker.    Independent Historian   None    Review of External Notes   I reviewed 3/30/25 discharge summary for NSTEMI and chest pain.  I reviewed 3/18/25 discharge summary for NSTEMI.    Past Medical History     Medical History and Problem List   NSTEMI   Prostatism  Labile blood pressure  Herpes simplex type 2 infection  GERD  Genital herpes simplex  Anal condyloma  Schwannoma     Medications   Norvasc  ASA  Lipitor  Imdur  Zestril  Toprol  Nitrostat  Brilinta     Surgical History   Past Surgical History:   Procedure Laterality Date    BACK SURGERY  04/23/2002    CV CORONARY ANGIOGRAM N/A 3/17/2025    Procedure: Coronary Angiogram;  Surgeon: Juan Garrett MD;  Location:  [facility]    CV CORONARY ANGIOGRAM N/A 3/27/2025    Procedure: Coronary Angiogram;  Surgeon: Jayden Thomason MD;  Location:  [facility]    CV INTRAVASULAR ULTRASOUND N/A 3/17/2025    Procedure: Intravascular Ultrasound;  Surgeon: " "Juan Garrett MD;  Location:  [facility]    CV PCI N/A 3/17/2025    Procedure: Percutaneous Coronary Intervention;  Surgeon: Juan Garrett MD;  Location:  [facility]    CV PCI N/A 3/27/2025    Procedure: Percutaneous Coronary Intervention;  Surgeon: Jayden Thomason MD;  Location:  [facility]       Physical Exam     Patient Vitals for the past 24 hrs:   BP Temp Pulse Resp SpO2 Height Weight   03/31/25 0815 124/82 -- 86 10 97 % -- --   03/31/25 0800 109/81 -- 94 13 95 % -- --   03/31/25 0745 (!) 143/87 -- 100 25 96 % -- --   03/31/25 0700 114/86 -- 97 14 97 % -- --   03/31/25 0600 107/71 -- 101 12 97 % -- --   03/31/25 0545 111/78 98.7  F (37.1  C) 110 18 100 % 1.727 m (5' 8\") 90.9 kg (200 lb 8 oz)     Physical Exam  Nursing note and vitals reviewed.    Constitutional:  Appears comfortable.    HENT:                Nose normal.  No discharge.      Oral mucosa is moist.  Eyes:    Conjunctivae are normal without injection.  Pupils are equal.  Cardiovascular:  Normal rate, regular rhythm with normal S1 and S2.      Normal heart sounds and peripheral pulses 2+ and equal.       No murmur or lizzette.  Pulmonary:  Effort normal and breath sounds clear to auscultation bilaterally.    No rales.     GI:    Soft. No distension and no mass. No tenderness.   Musculoskeletal:  Normal range of motion. No extremity deformity.     No edema and no tenderness.    Neurological:   Alert and oriented. No focal weakness.  Skin:    Skin is warm and dry. No rash noted.   Psychiatric:   Behavior is normal. Appropriate mood and affect.     Judgment and thought content normal.       Diagnostics     Lab Results   Labs Ordered and Resulted from Time of ED Arrival to Time of ED Departure   COMPREHENSIVE METABOLIC PANEL - Abnormal       Result Value    Sodium 137      Potassium 3.8      Carbon Dioxide (CO2) 23      Anion Gap 12      Urea Nitrogen 25.0 (*)     Creatinine 1.46 (*)     GFR Estimate 54 " (*)     Calcium 9.4      Chloride 102      Glucose 115 (*)     Alkaline Phosphatase 181 (*)     AST 27      ALT 36      Protein Total 7.7      Albumin 4.2      Bilirubin Total 1.1     TROPONIN T, HIGH SENSITIVITY - Abnormal    Troponin T, High Sensitivity 193 (*)    CBC WITH PLATELETS AND DIFFERENTIAL - Abnormal    WBC Count 12.3 (*)     RBC Count 4.37 (*)     Hemoglobin 13.9      Hematocrit 38.0 (*)     MCV 87      MCH 31.8      MCHC 36.6 (*)     RDW 12.0      Platelet Count 242      % Neutrophils 68      % Lymphocytes 19      % Monocytes 8      % Eosinophils 4      % Basophils 1      % Immature Granulocytes 1      NRBCs per 100 WBC 0      Absolute Neutrophils 8.3      Absolute Lymphocytes 2.4      Absolute Monocytes 1.0      Absolute Eosinophils 0.4      Absolute Basophils 0.1      Absolute Immature Granulocytes 0.1      Absolute NRBCs 0.0     TROPONIN T, HIGH SENSITIVITY - Abnormal    Troponin T, High Sensitivity 176 (*)        Imaging   No orders to display       EKG   ECG taken at 0536, ECG read at 0547  Sinus tachycardia  Possible inferior infarct, age undetermined  T wave abnormality, consider anterolateral ischemia  Abnormal ECG   Rate 110 bpm. IN interval 128 ms. QRS duration 78 ms. QT/QTc 350/473 ms. P-R-T axes 53 24 106.    Independent Interpretation   None    ED Course      Medications Administered   Medications   ticagrelor (BRILINTA) tablet 90 mg (has no administration in time range)       Procedures   Procedures     Discussion of Management   Admitting Hospitalist, Dr. Prak  Cardiology, Dr. Nieto    ED Course   ED Course as of 03/31/25 0923   Mon Mar 31, 2025   0619 I obtained history and examined the patient as noted above.     0836 I spoke with Dr. Nieto of cardiology regarding patient's presentation and plan of care.   0840 I rechecked the patient and updated them on possible admission to the hospital.   0848 I consulted with Dr. Park of the hospitalist service and discussed patient admission.  They accepted care of the patient.       Additional Documentation  None    Medical Decision Making / Diagnosis     CMS Diagnoses: None    MIPS       None    MDM   Fredy Chow is a 64 year old male with recent NSTEMI x 2 comes in with an episode of chest pain that lasted around an hour or so that went down both arms.  Nitro relieved by time he got here.  He is had no pain since has been or other than a couple second twinge of some sensation in his chest.  He is EKG was unchanged.  Troponin on the 27th or 4 days ago was 177.  Today it went up to 193 and then a 2-hour troponin came back at 176.  He is got a chronic renal failure stage IIIa that stable normal electrolytes and liver function test.  White count was slightly elevated.  He is pain-free now.  I talked with the cardiologist because of the fact that his troponin bumped transiently and the fact that he had gone home after his first NSTEMI and 2 stents had pain came back had another stent placed and another NSTEMI.  He recommended bringing the patient in to sort this out with serial troponins.  I talked with the hospitalist Dr. Park who will be admitting the patient.  No change in medications at this time.  Consult with cardiology.    Disposition   The patient was admitted to the hospital.     Diagnosis     ICD-10-CM    1. Chest pain, unspecified type  R07.9       2. Elevated troponin  R79.89       3. Acute renal failure superimposed on stage 3a chronic kidney disease, unspecified acute renal failure type (H)  N17.9     N18.31            Discharge Medications   New Prescriptions    No medications on file         Scribe Disclosure:  I, Jackelin Perez, am serving as a scribe at 6:27 AM on 3/31/2025 to document services personally performed by Latonia Valdes MD based on my observations and the provider's statements to me.        Latonia Valdes MD  03/31/25 8909

## 2025-04-01 LAB
ANION GAP SERPL CALCULATED.3IONS-SCNC: 13 MMOL/L (ref 7–15)
ATRIAL RATE - MUSE: 91 BPM
BUN SERPL-MCNC: 32.8 MG/DL (ref 8–23)
CALCIUM SERPL-MCNC: 9.4 MG/DL (ref 8.8–10.4)
CHLORIDE SERPL-SCNC: 103 MMOL/L (ref 98–107)
CREAT SERPL-MCNC: 1.65 MG/DL (ref 0.67–1.17)
DIASTOLIC BLOOD PRESSURE - MUSE: NORMAL MMHG
EGFRCR SERPLBLD CKD-EPI 2021: 46 ML/MIN/1.73M2
GLUCOSE SERPL-MCNC: 112 MG/DL (ref 70–99)
HCO3 SERPL-SCNC: 22 MMOL/L (ref 22–29)
INTERPRETATION ECG - MUSE: NORMAL
P AXIS - MUSE: NORMAL DEGREES
POTASSIUM SERPL-SCNC: 4.2 MMOL/L (ref 3.4–5.3)
PR INTERVAL - MUSE: 118 MS
QRS DURATION - MUSE: 66 MS
QT - MUSE: 384 MS
QTC - MUSE: 472 MS
R AXIS - MUSE: 79 DEGREES
SODIUM SERPL-SCNC: 138 MMOL/L (ref 135–145)
SYSTOLIC BLOOD PRESSURE - MUSE: NORMAL MMHG
T AXIS - MUSE: 230 DEGREES
VENTRICULAR RATE- MUSE: 91 BPM

## 2025-04-01 PROCEDURE — 99152 MOD SED SAME PHYS/QHP 5/>YRS: CPT | Performed by: INTERNAL MEDICINE

## 2025-04-01 PROCEDURE — 210N000002 HC R&B HEART CARE

## 2025-04-01 PROCEDURE — B2111ZZ FLUOROSCOPY OF MULTIPLE CORONARY ARTERIES USING LOW OSMOLAR CONTRAST: ICD-10-PCS | Performed by: INTERNAL MEDICINE

## 2025-04-01 PROCEDURE — 93005 ELECTROCARDIOGRAM TRACING: CPT

## 2025-04-01 PROCEDURE — G0378 HOSPITAL OBSERVATION PER HR: HCPCS

## 2025-04-01 PROCEDURE — 99418 PROLNG IP/OBS E/M EA 15 MIN: CPT | Performed by: NURSE PRACTITIONER

## 2025-04-01 PROCEDURE — 250N000009 HC RX 250: Performed by: INTERNAL MEDICINE

## 2025-04-01 PROCEDURE — 250N000011 HC RX IP 250 OP 636: Mod: JZ | Performed by: INTERNAL MEDICINE

## 2025-04-01 PROCEDURE — 258N000003 HC RX IP 258 OP 636: Performed by: PHYSICIAN ASSISTANT

## 2025-04-01 PROCEDURE — 250N000013 HC RX MED GY IP 250 OP 250 PS 637: Performed by: INTERNAL MEDICINE

## 2025-04-01 PROCEDURE — 93454 CORONARY ARTERY ANGIO S&I: CPT | Mod: 26 | Performed by: INTERNAL MEDICINE

## 2025-04-01 PROCEDURE — 99233 SBSQ HOSP IP/OBS HIGH 50: CPT | Performed by: NURSE PRACTITIONER

## 2025-04-01 PROCEDURE — 93010 ELECTROCARDIOGRAM REPORT: CPT | Mod: XU | Performed by: INTERNAL MEDICINE

## 2025-04-01 PROCEDURE — 250N000013 HC RX MED GY IP 250 OP 250 PS 637: Performed by: PHYSICIAN ASSISTANT

## 2025-04-01 PROCEDURE — 36415 COLL VENOUS BLD VENIPUNCTURE: CPT | Performed by: INTERNAL MEDICINE

## 2025-04-01 PROCEDURE — 99233 SBSQ HOSP IP/OBS HIGH 50: CPT | Mod: 25 | Performed by: PHYSICIAN ASSISTANT

## 2025-04-01 PROCEDURE — 250N000011 HC RX IP 250 OP 636: Performed by: INTERNAL MEDICINE

## 2025-04-01 PROCEDURE — 272N000001 HC OR GENERAL SUPPLY STERILE: Performed by: INTERNAL MEDICINE

## 2025-04-01 PROCEDURE — 80048 BASIC METABOLIC PNL TOTAL CA: CPT | Performed by: INTERNAL MEDICINE

## 2025-04-01 PROCEDURE — 93454 CORONARY ARTERY ANGIO S&I: CPT | Performed by: INTERNAL MEDICINE

## 2025-04-01 PROCEDURE — 99153 MOD SED SAME PHYS/QHP EA: CPT | Performed by: INTERNAL MEDICINE

## 2025-04-01 RX ORDER — OXYCODONE HYDROCHLORIDE 5 MG/1
5 TABLET ORAL EVERY 4 HOURS PRN
Status: DISCONTINUED | OUTPATIENT
Start: 2025-04-01 | End: 2025-04-02 | Stop reason: HOSPADM

## 2025-04-01 RX ORDER — NALOXONE HYDROCHLORIDE 0.4 MG/ML
0.4 INJECTION, SOLUTION INTRAMUSCULAR; INTRAVENOUS; SUBCUTANEOUS
Status: ACTIVE | OUTPATIENT
Start: 2025-04-01 | End: 2025-04-01

## 2025-04-01 RX ORDER — FENTANYL CITRATE 50 UG/ML
25 INJECTION, SOLUTION INTRAMUSCULAR; INTRAVENOUS
Status: DISCONTINUED | OUTPATIENT
Start: 2025-04-01 | End: 2025-04-02 | Stop reason: HOSPADM

## 2025-04-01 RX ORDER — LORAZEPAM 2 MG/ML
0.5 INJECTION INTRAMUSCULAR
Status: DISCONTINUED | OUTPATIENT
Start: 2025-04-01 | End: 2025-04-01 | Stop reason: HOSPADM

## 2025-04-01 RX ORDER — NALOXONE HYDROCHLORIDE 0.4 MG/ML
0.2 INJECTION, SOLUTION INTRAMUSCULAR; INTRAVENOUS; SUBCUTANEOUS
Status: ACTIVE | OUTPATIENT
Start: 2025-04-01 | End: 2025-04-01

## 2025-04-01 RX ORDER — LORAZEPAM 0.5 MG/1
0.5 TABLET ORAL
Status: DISCONTINUED | OUTPATIENT
Start: 2025-04-01 | End: 2025-04-01 | Stop reason: HOSPADM

## 2025-04-01 RX ORDER — SODIUM CHLORIDE 9 MG/ML
75 INJECTION, SOLUTION INTRAVENOUS CONTINUOUS
Status: ACTIVE | OUTPATIENT
Start: 2025-04-01 | End: 2025-04-01

## 2025-04-01 RX ORDER — OXYCODONE HYDROCHLORIDE 5 MG/1
10 TABLET ORAL EVERY 4 HOURS PRN
Status: DISCONTINUED | OUTPATIENT
Start: 2025-04-01 | End: 2025-04-02 | Stop reason: HOSPADM

## 2025-04-01 RX ORDER — LIDOCAINE 40 MG/G
CREAM TOPICAL
Status: DISCONTINUED | OUTPATIENT
Start: 2025-04-01 | End: 2025-04-01 | Stop reason: HOSPADM

## 2025-04-01 RX ORDER — ACETAMINOPHEN 325 MG/1
650 TABLET ORAL EVERY 4 HOURS PRN
Status: DISCONTINUED | OUTPATIENT
Start: 2025-04-01 | End: 2025-04-02 | Stop reason: HOSPADM

## 2025-04-01 RX ORDER — POTASSIUM CHLORIDE 1500 MG/1
20 TABLET, EXTENDED RELEASE ORAL
Status: DISCONTINUED | OUTPATIENT
Start: 2025-04-01 | End: 2025-04-01 | Stop reason: HOSPADM

## 2025-04-01 RX ORDER — ASPIRIN 81 MG/1
243 TABLET, CHEWABLE ORAL ONCE
Status: COMPLETED | OUTPATIENT
Start: 2025-04-01 | End: 2025-04-01

## 2025-04-01 RX ORDER — FLUMAZENIL 0.1 MG/ML
0.2 INJECTION, SOLUTION INTRAVENOUS
Status: ACTIVE | OUTPATIENT
Start: 2025-04-01 | End: 2025-04-01

## 2025-04-01 RX ORDER — IOPAMIDOL 755 MG/ML
INJECTION, SOLUTION INTRAVASCULAR
Status: DISCONTINUED | OUTPATIENT
Start: 2025-04-01 | End: 2025-04-01 | Stop reason: HOSPADM

## 2025-04-01 RX ORDER — ASPIRIN 325 MG
325 TABLET ORAL ONCE
Status: COMPLETED | OUTPATIENT
Start: 2025-04-01 | End: 2025-04-01

## 2025-04-01 RX ORDER — ATROPINE SULFATE 0.1 MG/ML
0.5 INJECTION INTRAVENOUS
Status: ACTIVE | OUTPATIENT
Start: 2025-04-01 | End: 2025-04-01

## 2025-04-01 RX ORDER — SODIUM CHLORIDE 9 MG/ML
INJECTION, SOLUTION INTRAVENOUS CONTINUOUS
Status: DISCONTINUED | OUTPATIENT
Start: 2025-04-01 | End: 2025-04-01 | Stop reason: HOSPADM

## 2025-04-01 RX ORDER — FENTANYL CITRATE 50 UG/ML
INJECTION, SOLUTION INTRAMUSCULAR; INTRAVENOUS
Status: DISCONTINUED | OUTPATIENT
Start: 2025-04-01 | End: 2025-04-01 | Stop reason: HOSPADM

## 2025-04-01 RX ADMIN — TICAGRELOR 90 MG: 90 TABLET ORAL at 20:13

## 2025-04-01 RX ADMIN — AMLODIPINE BESYLATE 5 MG: 5 TABLET ORAL at 22:04

## 2025-04-01 RX ADMIN — ASPIRIN 325 MG ORAL TABLET 325 MG: 325 PILL ORAL at 09:05

## 2025-04-01 RX ADMIN — SODIUM CHLORIDE: 9 INJECTION, SOLUTION INTRAVENOUS at 09:05

## 2025-04-01 RX ADMIN — TICAGRELOR 90 MG: 90 TABLET ORAL at 08:08

## 2025-04-01 RX ADMIN — ISOSORBIDE MONONITRATE 30 MG: 30 TABLET, EXTENDED RELEASE ORAL at 20:13

## 2025-04-01 RX ADMIN — METOPROLOL SUCCINATE 75 MG: 25 TABLET, EXTENDED RELEASE ORAL at 20:13

## 2025-04-01 RX ADMIN — ASPIRIN 81 MG: 81 TABLET, CHEWABLE ORAL at 20:13

## 2025-04-01 ASSESSMENT — ACTIVITIES OF DAILY LIVING (ADL)
ADLS_ACUITY_SCORE: 29

## 2025-04-01 NOTE — PLAN OF CARE
Fredy is alert, oriented, pleasant, cooperative. VSS on room air. Angio completed today; no new intervention required and previous stents are unchanged. Right groin site is puffy and has old bruising. CMS intact. Denies pain, nausea, dyspnea. Anticipate discharge to home tomorrow.

## 2025-04-01 NOTE — TELEPHONE ENCOUNTER
Prior Authorization Approval    Medication: BRILINTA 90 MG PO TABS  PA Initiated: 3/20/2025  PA Type: Clinical    Insurance: [employer] - Phone [phone] Fax [phone]  [facility] Key / Reference #: [id number]   Authorization Effective Dates: 3/20/2025 - 4/1/2027    Expected CoPay: $ 0.00  CoPay Card Eligible: Yes    Filling Pharmacy: [facility] [address] [place] [address]  Comments:  Patient used free trial voucher on discharge prescription for brilinta 3/17.    Dori Wooten on behalf of Daiana Tracy 4/1  [facility] LiaisonTEODORO  Ph: [phone]  Fax: [phone]  Available on Teams and Vocera

## 2025-04-01 NOTE — PLAN OF CARE
Goal Outcome Evaluation:    Care plan note:      Recent Vitals:  Temp: 98  F (36.7  C) Temp src: Oral BP: 119/81 Pulse: 101   Resp: 16 SpO2: 95 % O2 Device: None (Room air)      Orientation/Neuro: Alert and Oriented x 4  Pain: The patient is not having any pain.   Tele: Sinus rhythm    Lines/Drains: ***   IV medications: None   Ambulation/Assist: up Independently   Skin: With in normal limits and scattered brusing   GI: WDL  : WDL       Diet: Tolerating diet:   Well  Orders Placed This Encounter      Low Saturated Fat Na <2400 mg      Safety/Concerns:  None  Aggression Color: Green    Plan: cardiac rehab, discharge   Continue to monitor.      Tatiana Dubno RN

## 2025-04-01 NOTE — PROGRESS NOTES
[facility]    Medicine Progress Note - Hospitalist Service    Date of Admission:  3/31/2025    Assessment & Plan      Fredy Chow is a 64 year old male presenting on 3/31/2025 with chest pain.  He was discharged 3/30 after a hospital stay for NSTEMI s/p coronary angiography/stent large diagonal following a NSTEMI 3/17/2025 s/p PCI DILAN x2 to mid-distal LAD.    Chest pain  Recent NSTEMI with DILAN as noted above  Hyperlipidemia:  *NSTEMI with PCI to the mid-distal LAD on 3/17/2025, returning to the hospital with recurrent chest pain and undergoing PCI of the ostial D2 on 3/27, with known residual moderate RCA branch lesion in a small system   *Troponin modestly elevated but actually overall lower than other recent values earlier this month.    - Cardiology consulted by the ED, registered to observation for further consultation   - Plan for coronary angiogram today to ensure recent LAD/L1 stents remain patent   - Continue PTA ASA and brilinta pending further conversations with cardiology   - Hold on heparin drip unless further pain, signs of new acute cardiac change.   - NPO pending coronary angiogram   - Continue PTA atorvastatin, imdur, metoprolol  - Hold PTA lisinopril     Dyspnea  *Reports SOB overnight since last admission, worse while laying down  - TTE reviewed, EF 55-60%  - Suspect it could be related to Brilinta, will discuss with cardiology.     HTN:  - Continue amlodipine, metoprolol with hold parameters.  - Hold PTA lisinopril as above     CKD likely stage II:  Baseline 1.3-1.4  -Patient unaware of kidney disease prior to most recent admission, he has a long history of uncontrolled hypertension   -Continue to monitor, Cr. 1.65 this AM   -Repeat BMP AM   -Outpatient follow-up with primary care       Observation Goals: -diagnostic tests and consults completed and resulted, -vital signs normal or at patient baseline, -adequate pain control on oral analgesics, -cardiology  "assessment, Nurse to notify provider when observation goals have been met and patient is ready for discharge.  Diet: NPO for Medical/Clinical Reasons Except for: Meds  NPO for Procedure/Surgery per Anesthesia Guidelines Except for: Meds; Clear liquids before procedure/surgery: ADULT (Age GREATER than or Equal to 18 years) - Clear liquids 2 hours before procedure/surgery    DVT Prophylaxis: Pneumatic Compression Devices  Gifford Catheter: Not present  Lines: None     Cardiac Monitoring: ACTIVE order. Indication: Chest pain/ ACS rule out (24 hours)  Code Status: Full Code      Clinically Significant Risk Factors Present on Admission                 # Drug Induced Platelet Defect: home medication list includes an antiplatelet medication   # Hypertension: Home medication list includes antihypertensive(s)           # Obesity: Estimated body mass index is 30.18 kg/m  as calculated from the following:    Height as of this encounter: 1.727 m (5' 8\").    Weight as of this encounter: 90 kg (198 lb 8 oz).              Social Drivers of Health    Tobacco Use: Medium Risk (3/25/2025)    Patient History     Smoking Tobacco Use: Former     Smokeless Tobacco Use: Unknown          Disposition Plan     Medically Ready for Discharge: Anticipated Tomorrow pending coronary angiogram            The patient's care was discussed with the Attending Physician, Dr. Andrews, Bedside Nurse, and Patient.    LETICIA Holder [facility]  Hospitalist Service  [facility]  Securely message with [employer] (more info)  Text page via [facility] Paging/Directory   ______________________________________________________________________    Interval History   Patient reports 4-5 episodes of non-radiating, stabbing left sided chest pain overnight. He states that each episode of pain lasted for a few seconds to one minute and resolved without intervention. Also reports so SOB overnight. Denies SOB and chest pain this morning.     Physical Exam "   Vital Signs: Temp: 98.2  F (36.8  C) Temp src: Oral BP: 108/86 Pulse: 87   Resp: 16 SpO2: 96 % O2 Device: None (Room air)    Weight: 198 lbs 8 oz    Physical Exam  Vitals and nursing note reviewed.   Constitutional:       General: He is not in acute distress.  HENT:      Mouth/Throat:      Mouth: Mucous membranes are moist.   Eyes:      Pupils: Pupils are equal, round, and reactive to light.   Cardiovascular:      Rate and Rhythm: Normal rate and regular rhythm.      Pulses: Normal pulses.      Heart sounds: Normal heart sounds.   Pulmonary:      Effort: Pulmonary effort is normal.      Breath sounds: Normal breath sounds.   Abdominal:      General: There is no distension.      Palpations: Abdomen is soft.   Skin:     General: Skin is warm and dry.   Neurological:      General: No focal deficit present.      Mental Status: He is alert and oriented to person, place, and time.           Medical Decision Making       65 MINUTES SPENT BY ME on the date of service doing chart review, history, exam, documentation & further activities per the note.      Data     I have personally reviewed the following data over the past 24 hrs:    N/A  \   N/A   / N/A     138 103 32.8 (H) /  112 (H)   4.2 22 1.65 (H) \

## 2025-04-01 NOTE — PROGRESS NOTES
"[facility] Cardiology Progress Note  Date of Service: 04/01/2025    Primary Cardiologist: Will be Dr. Zheng Figueroa Claudine is a 64 year old male with PMH including recent NSTEMI with PCI to the mid-distal LAD on 3/17/2025, returning to the hospital with recurrent chest pain and undergoing PCI of the ostial D2 on 3/27, with known residual moderate RCA branch lesion in a small system, who returns to the hospital again 1 day after discharge with atypical chest pain (which sounds consistent with \"stretch pain\"), and bilateral arm paresthesias which has spontaneously improved (with no clear response to SL NTG), downtrending troponins. At present, he feels well, with no recurrence of his presenting symptoms, did have some dyspnea overnight. However, EKG this morning does show evolving ST/T wave abnormality particularly in the anteroseptal leads.    Assessment:  Recent NSTEMI with PCI to the LAD and D2 and residual RCA branch lesion and a small system, as noted above, with recurrent atypical chest pain and evolving EKG abnormality.  Acute on chronic kidney disease, creatinine up to 1.65 today. Baseline 1.3-1.4.  Normal biventricular fn, no significant VHD.    Plan:   Recommend repeat coronary angiogram today to ensure recent LAD/D1 stents remain patent. If no new lesions, would not intervene on small RCA branch. Will need hydration before and after.  Hold lisinopril.  Continue aspirin, Brilinta, amlodipine, metoprolol, Imdur.  Risks and benefits of left heart catheterization and coronary angiogram were discussed with the patient in detail. 0.1-0.3% (for diagnostic angio) and 1-2% (for PCI)  risk of stroke, MI, death, emergent bypass for diagnostic angio, risk of contrast induced allergic reaction, renal dysfunction, vascular complications were discussed. Patient understands and wishes to proceed.     Plan was formulated under direction of Dr. Nieto.   Dori Kohler, PAUL  [facility] " [facility]  Vocera page  __________________________________________________________________________  Physical Exam   Temp: 98.2  F (36.8  C) Temp src: Oral BP: 108/86 Pulse: 87   Resp: 16 SpO2: 96 % O2 Device: None (Room air)    Vitals:    03/31/25 0545 03/31/25 1541 04/01/25 0514   Weight: 90.9 kg (200 lb 8 oz) 90.2 kg (198 lb 14.4 oz) 90 kg (198 lb 8 oz)       GENERAL:  The patient is in no apparent distress.   NECK: CVP appears normal, no masses or thyromegaly.  PULMONARY:  There is a normal respiratory effort. Clear lungs to auscultation bilaterally.   CARDIOVASCULAR:  RRR, normal S1 S2, no m/r/g.  GI:  Non tender abdomen with normoactive bowel sounds and no hepatosplenomegaly. There are no masses palpable.   EXTREMITIES:  No clubbing, cyanosis or edema.  VASCULAR: 2+ Pulses bilaterally in upper and lower extremities.    Medications   Current Facility-Administered Medications   Medication Dose Route Frequency Provider Last Rate Last Admin     Current Facility-Administered Medications   Medication Dose Route Frequency Provider Last Rate Last Admin    amLODIPine (NORVASC) tablet 5 mg  5 mg Oral At Bedtime Karel Park P, DO   5 mg at 03/31/25 2111    aspirin (ASA) chewable tablet 81 mg  81 mg Oral QPM Karel Park P, DO   81 mg at 03/31/25 2110    isosorbide mononitrate (IMDUR) 24 hr tablet 30 mg  30 mg Oral QPM Karel Park P, DO   30 mg at 03/31/25 2110    lisinopril (ZESTRIL) tablet 10 mg  10 mg Oral QPM Karel Park P, DO   10 mg at 03/31/25 2111    metoprolol succinate ER (TOPROL XL) 24 hr tablet 75 mg  75 mg Oral QPM Karel Park P, DO   75 mg at 03/31/25 2110    ticagrelor (BRILINTA) tablet 90 mg  90 mg Oral BID Karel Park DO   90 mg at 04/01/25 0808       Data   Most Recent 3 CBC's:  Recent Labs   Lab Test 03/31/25  0551 03/28/25  1609 03/26/25  1122   WBC 12.3* 10.1 9.7   HGB 13.9 13.1* 16.0   MCV 87 89 87    222 282     Most Recent 3 BMP's:  Recent Labs   Lab Test  04/01/25  0552 03/31/25  0551 03/29/25  1448    137 136   POTASSIUM 4.2 3.8 4.2   CHLORIDE 103 102 100   CO2 22 23 23   BUN 32.8* 25.0* 23.6*   CR 1.65* 1.46* 1.34*   ANIONGAP 13 12 13   PATRIA 9.4 9.4 9.6   * 115* 168*

## 2025-04-02 ENCOUNTER — APPOINTMENT (OUTPATIENT)
Dept: PHYSICAL THERAPY | Facility: CLINIC | Age: 65
End: 2025-04-02
Payer: COMMERCIAL

## 2025-04-02 VITALS
RESPIRATION RATE: 18 BRPM | HEIGHT: 68 IN | DIASTOLIC BLOOD PRESSURE: 91 MMHG | OXYGEN SATURATION: 98 % | HEART RATE: 105 BPM | BODY MASS INDEX: 30.16 KG/M2 | TEMPERATURE: 98.6 F | WEIGHT: 199 LBS | SYSTOLIC BLOOD PRESSURE: 140 MMHG

## 2025-04-02 LAB
ANION GAP SERPL CALCULATED.3IONS-SCNC: 14 MMOL/L (ref 7–15)
BUN SERPL-MCNC: 25.6 MG/DL (ref 8–23)
CALCIUM SERPL-MCNC: 8.9 MG/DL (ref 8.8–10.4)
CHLORIDE SERPL-SCNC: 106 MMOL/L (ref 98–107)
CREAT SERPL-MCNC: 1.38 MG/DL (ref 0.67–1.17)
EGFRCR SERPLBLD CKD-EPI 2021: 57 ML/MIN/1.73M2
GLUCOSE SERPL-MCNC: 107 MG/DL (ref 70–99)
HCO3 SERPL-SCNC: 21 MMOL/L (ref 22–29)
POTASSIUM SERPL-SCNC: 4.3 MMOL/L (ref 3.4–5.3)
SODIUM SERPL-SCNC: 141 MMOL/L (ref 135–145)

## 2025-04-02 PROCEDURE — 99239 HOSP IP/OBS DSCHRG MGMT >30: CPT | Performed by: NURSE PRACTITIONER

## 2025-04-02 PROCEDURE — 97110 THERAPEUTIC EXERCISES: CPT | Mod: GP

## 2025-04-02 PROCEDURE — 97530 THERAPEUTIC ACTIVITIES: CPT | Mod: GP

## 2025-04-02 PROCEDURE — 80048 BASIC METABOLIC PNL TOTAL CA: CPT | Performed by: NURSE PRACTITIONER

## 2025-04-02 PROCEDURE — 36415 COLL VENOUS BLD VENIPUNCTURE: CPT | Performed by: NURSE PRACTITIONER

## 2025-04-02 PROCEDURE — 250N000013 HC RX MED GY IP 250 OP 250 PS 637: Performed by: INTERNAL MEDICINE

## 2025-04-02 RX ORDER — NALOXONE HYDROCHLORIDE 0.4 MG/ML
0.4 INJECTION, SOLUTION INTRAMUSCULAR; INTRAVENOUS; SUBCUTANEOUS
Status: DISCONTINUED | OUTPATIENT
Start: 2025-04-02 | End: 2025-04-02 | Stop reason: HOSPADM

## 2025-04-02 RX ORDER — NALOXONE HYDROCHLORIDE 0.4 MG/ML
0.2 INJECTION, SOLUTION INTRAMUSCULAR; INTRAVENOUS; SUBCUTANEOUS
Status: DISCONTINUED | OUTPATIENT
Start: 2025-04-02 | End: 2025-04-02 | Stop reason: HOSPADM

## 2025-04-02 RX ADMIN — TICAGRELOR 90 MG: 90 TABLET ORAL at 09:09

## 2025-04-02 ASSESSMENT — ACTIVITIES OF DAILY LIVING (ADL)
ADLS_ACUITY_SCORE: 29
ADLS_ACUITY_SCORE: 29
ADLS_ACUITY_SCORE: 30
ADLS_ACUITY_SCORE: 29
ADLS_ACUITY_SCORE: 30
ADLS_ACUITY_SCORE: 30
ADLS_ACUITY_SCORE: 29
ADLS_ACUITY_SCORE: 30
ADLS_ACUITY_SCORE: 29
ADLS_ACUITY_SCORE: 30

## 2025-04-02 NOTE — DISCHARGE INSTRUCTIONS
Cardiac Angiogram Discharge Instructions - Femoral    After you go home:    Have an adult stay with you until tomorrow.  Drink extra fluids for 2 days.  You may resume your normal diet.  No smoking       For 24 hours - due to the sedation you received:  Relax and take it easy.  Do NOT make any important or legal decisions.  Do NOT drive or operate machines at home or at work.  Do NOT drink alcohol.    Care of Groin Puncture Site:    For the first 24 hrs - check the puncture site every 1-2 hours while awake.  For 2 days, when you cough, sneeze, laugh or move your bowels, hold your hand over the puncture site and press firmly.  Remove the bandaid after 24 hours. If there is minor oozing, apply another bandaid and remove it after 12 hours.  It is normal to have a small bruise or pea size lump at the site.  You may shower tomorrow. Do NOT take a bath, or use a hot tub or pool for at least 3 days. Do NOT scrub the site. Do not use lotion or powder near the puncture site.    Activity:            For 2 days:  No stooping or squatting  Do NOT do any heavy activity such as exercise, lifting, or straining.   No housework, yard work or any activity that make you sweat  Do NOT lift more than 10 pounds    Bleeding:    If you start bleeding from the site in your groin, lie down flat and press firmly on/above the site for 10 minutes.   Once bleeding stops, lay flat for 2 hours.   Call [facility] as soon as you can.       Call 911 right away if you have heavy bleeding or bleeding that does not stop.      Medicines:    If you are taking an antiplatelet medication such as Plavix, Brilinta or Effient, do not stop taking it until you talk to your cardiologist.    If you are on Metformin (Glucophage), do not restart it until you have blood tests (within 2 to 3 days after discharge).  After you have your blood drawn, you may restart the Metformin.   Take your medications, including blood thinners, unless your provider tells you not to.     If you take Coumadin (Warfarin), have your INR checked by your provider in  3-5 days. Call your clinic to schedule this.  If you have stopped any medicines, check with your provider about when to restart them.    Follow Up Appointments:    Follow up with [facility] Nurse Practitioner at [facility] of patient preference in 7-10 days.    Call the clinic if:    You have increased pain or a large or growing hard lump around the site.  The site is red, swollen, hot or tender.  Blood or fluid is draining from the site.  You have chills or a fever greater than 101 F (38 C).  Your leg feels numb, cool or changes color.  You have hives, a rash or unusual itching.  New pain in the back or belly that you cannot control with Tylenol.  Any questions or concerns.          [facility] [place]:    [phone] [facility] (7 days a week)    Or you may contact your provider via My Chart

## 2025-04-02 NOTE — DISCHARGE SUMMARY
"[facility]  Hospitalist Discharge Summary      Date of Admission:  3/31/2025  Date of Discharge:  4/2/2025  Discharging Provider: LETICIA Holder CNP  Discharge Service: Hospitalist Service    Discharge Diagnoses   Chest pain  Recent NSTEMI with DILAN as noted above  Hyperlipidemia  Dyspnea  HTN   CKD, likely stage II     Clinically Significant Risk Factors     # Obesity: Estimated body mass index is 30.26 kg/m  as calculated from the following:    Height as of this encounter: 1.727 m (5' 8\").    Weight as of this encounter: 90.3 kg (199 lb).       Follow-ups Needed After Discharge   Follow-up Appointments       Follow Up      Follow up with cardiology as directed.    Follow up with primary care provider within 7 days for repeat BMP to further assess creatinine and suspected CKD stage II.                Discharge Disposition   Discharged to home  Condition at discharge: Stable    Hospital Course   Fredy Chow is a 64 year old male presenting on 3/31/2025 with chest pain.  He was discharged 3/30 after a hospital stay for NSTEMI s/p coronary angiography/stent large diagonal following a NSTEMI 3/17/2025 s/p PCI DILAN x2 to mid-distal LAD.    He is now s/p repeat coronary angiogram on 4/1/2025. He had no recurrent chest discomfort overnight, he also reports no shortness of breath last night. Was seen walking on the treadmill during cardiac rehab today, able to carry on a conversation while exercising without dyspnea.      Chest pain  Recent NSTEMI with DILAN as noted above  Hyperlipidemia:  *NSTEMI with PCI to the mid-distal LAD on 3/17/2025, returning to the hospital with recurrent chest pain and undergoing PCI of the ostial D2 on 3/27, with known residual moderate RCA branch lesion in a small system   *Troponin modestly elevated but actually overall lower than other recent values earlier this month.    - Cardiology consulted.   - Repeat coronary angiogram 4/1, no additional " stents placed. Recent LAD/L1 stents remain patent   - Continue PTA ASA and brilinta  - Continue PTA atorvastatin, imdur, metoprolol, lisinopril at discharge   - Follow up with cardiology      Dyspnea  *Reports intermittent SOB overnight since last admission  - TTE reviewed, EF 55-60%  - Could be related to Brilinta vs some mild anxiety. Patient reports when he was feeling SOB the evening of 4/1 he was nervous about upcoming angiogram. Discussed with cardiology and patient. Plan to continue Brilinta at this time, cardiology will monitor as an outpatient .      HTN:  - Resume PTA amlodipine, metoprolol, lisinopril     CKD likely stage II:  Baseline 1.3-1.4  -Patient unaware of kidney disease prior to most recent admission, he has a long history of uncontrolled hypertension   - Cr. 1.65, improved to 1.38 on day of discharge   -Outpatient follow-up with primary care    Consultations This Hospital Stay   CARDIOLOGY IP CONSULT  CARDIAC REHAB IP CONSULT  PHARMACY IP CONSULT  SMOKING CESSATION PROGRAM IP CONSULT    Code Status   Full Code    Time Spent on this Encounter   I, LETICIA Holder CNP, personally saw the patient today and spent greater than 30 minutes discharging this patient.       LETICIA Holder CNP  [facility]  [address]  [place] [facility] [address]  Phone: [phone]  ______________________________________________________________________    Physical Exam   Vital Signs: Temp: 98.5  F (36.9  C) Temp src: Oral BP: 114/74 Pulse: 105   Resp: 18 SpO2: 99 % O2 Device: None (Room air) Oxygen Delivery: 2 LPM  Weight: 199 lbs 0 oz  Physical Exam  Vitals and nursing note reviewed.   Constitutional:       General: He is not in acute distress.     Appearance: Normal appearance.   HENT:      Mouth/Throat:      Mouth: Mucous membranes are moist.   Eyes:      Pupils: Pupils are equal, round, and reactive to light.   Cardiovascular:      Rate and Rhythm: Normal  rate and regular rhythm.      Pulses: Normal pulses.      Heart sounds: Normal heart sounds. No murmur heard.  Pulmonary:      Effort: Pulmonary effort is normal. No respiratory distress.      Breath sounds: Normal breath sounds.   Abdominal:      General: There is no distension.      Palpations: Abdomen is soft.      Tenderness: There is no abdominal tenderness.   Musculoskeletal:         General: Normal range of motion.   Skin:     General: Skin is warm and dry.      Capillary Refill: Capillary refill takes less than 2 seconds.   Neurological:      General: No focal deficit present.      Mental Status: He is alert and oriented to person, place, and time.              Primary Care Physician   Caitlyn Clements    Discharge Orders      Reason for your hospital stay    You were hospitalized for evaluation of chest pain.  You were seen by cardiology and went for a repeat coronary angiogram.     Activity    Your activity upon discharge: activity as tolerated and no driving for today     Follow Up    Follow up with cardiology as directed.    Follow up with primary care provider within 7 days for repeat BMP to further assess creatinine and suspected CKD stage II.     Diet    Follow this diet upon discharge: Current Diet:Orders Placed This Encounter      Combination Diet Low Saturated Fat Na <2400mg Diet       Significant Results and Procedures   Results for orders placed or performed during the hospital encounter of 03/31/25   Cardiac Catheterization    Narrative      Prox LAD lesion is 40% stenosed.    Mid Cx lesion is 35% stenosed.    Mid RCA lesion is 35% stenosed.    Prox RCA to Mid RCA lesion is 35% stenosed.    RV Branch lesion is 90% stenosed.    Dist LAD lesion is 90% stenosed.    1st Mrg lesion is 65% stenosed.    No change in coronary anatomy.  Recommend medical management         Discharge Medications   Current Discharge Medication List        CONTINUE these medications which have NOT CHANGED    Details    amLODIPine (NORVASC) 5 MG tablet Take 1 tablet (5 mg) by mouth at bedtime.  Qty: 30 tablet, Refills: 0    Associated Diagnoses: NSTEMI (non-ST elevated myocardial infarction) (H)      aspirin (ASA) 81 MG chewable tablet Take 1 tablet (81 mg) by mouth daily. Starting tomorrow.  Qty: 30 tablet, Refills: 3    Associated Diagnoses: NSTEMI (non-ST elevated myocardial infarction) (H)      atorvastatin (LIPITOR) 40 MG tablet Take 1 tablet (40 mg) by mouth daily.  Qty: 90 tablet, Refills: 3    Associated Diagnoses: NSTEMI (non-ST elevated myocardial infarction) (H)      isosorbide mononitrate (IMDUR) 30 MG 24 hr tablet Take 1 tablet (30 mg) by mouth daily.  Qty: 30 tablet, Refills: 0    Associated Diagnoses: NSTEMI (non-ST elevated myocardial infarction) (H)      lisinopril (ZESTRIL) 10 MG tablet TAKE ONE TABLET BY MOUTH ONCE DAILY  Qty: 30 tablet, Refills: 1    Associated Diagnoses: Benign essential hypertension      metoprolol succinate ER (TOPROL XL) 25 MG 24 hr tablet Take 3 tablets (75 mg) by mouth daily.  Qty: 90 tablet, Refills: 0    Associated Diagnoses: NSTEMI (non-ST elevated myocardial infarction) (H)      nitroGLYcerin (NITROSTAT) 0.4 MG sublingual tablet For chest pain place 1 tablet under the tongue every 5 minutes for 3 doses. If symptoms persist 5 minutes after 1st dose call 911.  Qty: 30 tablet, Refills: 0    Associated Diagnoses: NSTEMI (non-ST elevated myocardial infarction) (H)      ticagrelor (BRILINTA) 90 MG tablet Take 1 tablet (90 mg) by mouth 2 times daily. Dose to start this evening.  Qty: 180 tablet, Refills: 3    Associated Diagnoses: NSTEMI (non-ST elevated myocardial infarction) (H)           Allergies   No Known Allergies

## 2025-04-02 NOTE — PLAN OF CARE
Cardiac Rehab Discharge Summary    Reason for therapy discharge:    Discharged to home with outpatient therapy.    Progress towards therapy goal(s). See goals on Care Plan in [facility] electronic health record for goal details.  Goals met    Therapy recommendation(s):    Continued therapy is recommended.  Rationale/Recommendations:   .  Continue home exercise program. Pt tolerated session well. Pt limited by decreased activity tolerance, post PCI/MI precautions. Pt at baseline is ind, has stairs at home, lives with friend, reports friend at home during the day. Pt currently ind with mobility, able to tolerate 8 mins on treadmill. Anticipate when medically ready, pt may discharge to home with assist as needed for heavier tasks such as laundry, grocery shopping, cooking, cleaning, etc. OP CR. Will continue to update as appropriate.  PT Brief overview of current status: ind  PT Total Distance Amb During Session (feet): 350

## 2025-04-02 NOTE — PLAN OF CARE
Active Problem: Chest pain  Hx: NSTEIM, HTN, CKD  VS: VSS, elevated BP of 165/95  Tele/Cardiac: SR  Gray/Neuro: A&O x4  Resp: RA  Pain: Denies  GI/: WDL  Skin: Bruising on R groin site, R wrist, and L forearm  IV Lines/Drains: PIV SL  Activity/Diet: Independent, as tolerated. Low sat fat Na < 2400mg  Plan: Poss discharge

## 2025-04-02 NOTE — PLAN OF CARE
Goal Outcome Evaluation           A&Ox4, VSS on RA. Up IND. No c/o pain or shortness of breath. Home with family today. Follow up in place. R femoral site WDL, cms intact

## 2025-04-07 ENCOUNTER — OFFICE VISIT (OUTPATIENT)
Dept: CARDIOLOGY | Facility: CLINIC | Age: 65
End: 2025-04-07
Payer: COMMERCIAL

## 2025-04-07 ENCOUNTER — HOSPITAL ENCOUNTER (OUTPATIENT)
Dept: CARDIAC REHAB | Facility: CLINIC | Age: 65
Discharge: HOME | End: 2025-04-07
Attending: HOSPITALIST
Payer: COMMERCIAL

## 2025-04-07 VITALS
SYSTOLIC BLOOD PRESSURE: 121 MMHG | OXYGEN SATURATION: 97 % | HEART RATE: 96 BPM | DIASTOLIC BLOOD PRESSURE: 76 MMHG | WEIGHT: 199.8 LBS | BODY MASS INDEX: 30.28 KG/M2 | HEIGHT: 68 IN

## 2025-04-07 DIAGNOSIS — I21.4 NSTEMI (NON-ST ELEVATED MYOCARDIAL INFARCTION) (H): ICD-10-CM

## 2025-04-07 DIAGNOSIS — I25.10 CORONARY ARTERY DISEASE INVOLVING NATIVE CORONARY ARTERY OF NATIVE HEART WITHOUT ANGINA PECTORIS: ICD-10-CM

## 2025-04-07 DIAGNOSIS — I10 BENIGN ESSENTIAL HYPERTENSION: ICD-10-CM

## 2025-04-07 DIAGNOSIS — I10 PRIMARY HYPERTENSION: Primary | ICD-10-CM

## 2025-04-07 PROCEDURE — 3078F DIAST BP <80 MM HG: CPT | Performed by: NURSE PRACTITIONER

## 2025-04-07 PROCEDURE — 99214 OFFICE O/P EST MOD 30 MIN: CPT | Performed by: NURSE PRACTITIONER

## 2025-04-07 PROCEDURE — G2211 COMPLEX E/M VISIT ADD ON: HCPCS | Performed by: NURSE PRACTITIONER

## 2025-04-07 PROCEDURE — 3074F SYST BP LT 130 MM HG: CPT | Performed by: NURSE PRACTITIONER

## 2025-04-07 PROCEDURE — 93798 PHYS/QHP OP CAR RHAB W/ECG: CPT | Performed by: OCCUPATIONAL THERAPIST

## 2025-04-07 RX ORDER — AMLODIPINE BESYLATE 5 MG/1
5 TABLET ORAL AT BEDTIME
Qty: 90 TABLET | Refills: 4 | Status: SHIPPED | OUTPATIENT
Start: 2025-04-07

## 2025-04-07 RX ORDER — METOPROLOL SUCCINATE 25 MG/1
75 TABLET, EXTENDED RELEASE ORAL DAILY
Qty: 90 TABLET | Refills: 4 | Status: SHIPPED | OUTPATIENT
Start: 2025-04-07

## 2025-04-07 RX ORDER — ISOSORBIDE MONONITRATE 30 MG/1
30 TABLET, EXTENDED RELEASE ORAL DAILY
Qty: 90 TABLET | Refills: 4 | Status: SHIPPED | OUTPATIENT
Start: 2025-04-07

## 2025-04-07 RX ORDER — ASPIRIN 81 MG/1
81 TABLET, CHEWABLE ORAL DAILY
Qty: 90 TABLET | Refills: 3 | Status: SHIPPED | OUTPATIENT
Start: 2025-04-07

## 2025-04-07 RX ORDER — LISINOPRIL 10 MG/1
10 TABLET ORAL DAILY
Qty: 90 TABLET | Refills: 4 | Status: CANCELLED | OUTPATIENT
Start: 2025-04-07

## 2025-04-07 RX ORDER — LISINOPRIL 5 MG/1
5 TABLET ORAL DAILY
Qty: 90 TABLET | Refills: 3 | Status: SHIPPED | OUTPATIENT
Start: 2025-04-07

## 2025-04-07 RX ORDER — ATORVASTATIN CALCIUM 40 MG/1
40 TABLET, FILM COATED ORAL DAILY
Qty: 90 TABLET | Refills: 4 | Status: SHIPPED | OUTPATIENT
Start: 2025-04-07

## 2025-04-07 RX ORDER — NITROGLYCERIN 0.4 MG/1
TABLET SUBLINGUAL
Qty: 30 TABLET | Refills: 1 | Status: SHIPPED | OUTPATIENT
Start: 2025-04-07

## 2025-04-07 NOTE — LETTER
4/7/2025    Caitlyn Clements MD  [facility] [address] [place] [address]    RE: Fredy Chow       Dear Colleague,     I had the pleasure of seeing Fredy Chow in the [facility].        Cardiology Clinic Follow up     Fredy Chow MRN# [id number]   YOB: 1960 Age: 64 year old     Primary cardiologist: Dr. Calzada  (initial hospital consult)    Reason for visit: Post hospital follow up    History of presenting illness:    Fredy Chow is a pleasant 64 year old male with past medical history significant for HTN, CKD, hyperlipidemia, recent NSTEMI 3/17/2025 s/p PCI DILAN x2 to mid-distal LAD (serial lesions 80% and 100%); residual moderate disease in his RCA and possibly obstructive lesions in his smallish RPL RV marginal branches who was re-admitted on 3/25/2025 with NSTEMI. Presented with diaphoresis and chest pain relieved with nitroglycerin. HS troponin 270 (down from 337 on 3/17/25). Started on IV heparin. Echocardiogram showed stable normal LVEF and no effusion.  Underwent coronary angiogram, previous mid LAD DILAN patent with no restenosis s/p PCI DILAN to 2nd mid diag (80%). RV branch (90%) not intervened due to smaller system and renal insuffiencey/dye load. He was monitored over the weekend on antianginals and discharged home.    He subsequently presented back a day later on 3/31/25 with atypical chest pain and bilateral arm paresthesias which spontaneously improved. ECG with some ST/T wave abnormalities. He underwent repeat coronary angiogram on 4/1/25 which showed no change in coronary anatomy - patent mid LAD stent and 2nd diag stent. Distal LAD 90% stenosis and RV branch 90% stenosis. Recommendation for medical management.     Presents today for post hospital follow up.    Today, Fredy presents for follow up. Since discharge he has been feeling a little lightheaded. Blood pressure after cardiac rehab today  90/62. Denies any chest pain, dyspnea or LE edema. Occasional palpitations when lying down to sleep at night.  Radial and groin sites healed. Wonders about ED medication use. Inquires about alcohol use. Stopped drinking pop. Making diet changes. Weight is down.             Assessment and Plan:     ASSESSMENT:    CAD, recent NSTEMI s/p PCI DESx3 to mid-distal LAD (3/17/25), presented back with NSTEMI/USA s/p PCI to 2nd mid Diag (80%) 3/27/2025  -RV branch (90%) not intervened due to smaller system, antianginals were added. Presented back again on 3/31/25 with atypical chest pain and bilateral arm paresthesias which spontaneously improved. ECG with some ST/T wave abnormalities. He underwent repeat coronary angiogram on 4/1/25 which showed no change in coronary anatomy - patent mid LAD stent and 2nd diag stent. Distal LAD 90% stenosis and RV branch 90% stenosis. Recommendation for medical management.   -Denies any recurrence of chest pain or dyspnea   -Continue dual antiplatelet medications with aspirin and Brilinta 90mg twice daily for 1 year (through March 2026)  -Additional antianginals added post PCI with amlodipine 5mg, metoprolol XL 75mg, Imdur 30mg  -Avoid PDE5-i use while on Imdur. Continue imdur for now, consider trial off after 1 year  -Continue atorvastatin 40mg daily - consider increase to 80mg in follow up  -Continue cardiac rehab on Mondays     HTN  Orthostasis  -With addition of antianginals will reduce lisinopril to 5mg  -Continue on amlodipine 5mg, Imdur 30mg, metoprolol XL 75mg  -Cautioned to ensure hydration, limit alcohol use to 1 standard beer or wine on occasion and possible increased alcohol effect    Hyperlipidemia  - (3/3/25) - -LDL goal < 55  -Continues on atorvastatin 40mg, consider dose increase to 80mg in follow up  -Repeat fasting lipids in 3 months     ANA/CKD  -Reduce lisinopril to 5mg  -Creatinine 1.38 (4/2/25)  -Repeat BMP with PCP visit upcoming       PLAN:     Reduce  lisinopril to 5mg once daily - consider stopping if ongoing orthostasis or SBP < 110  Continue other current medications: aspirin and Brilinta 90mg twice daily for 1 year, amlodipine, metoprolol, Imdur, atorvastatin  All prescriptions were transferred to [facility]   Continue cardiac rehab on Mondays  Follow up in 2 weeks          Michelle Max, [place], APRN, CNP  [facility]     Orders this Visit:  Orders Placed This Encounter   Procedures     Follow-Up with Cardiology [facility]     Orders Placed This Encounter   Medications     amLODIPine (NORVASC) 5 MG tablet     Sig: Take 1 tablet (5 mg) by mouth at bedtime.     Dispense:  90 tablet     Refill:  4     Keep on file until requested. Transfer from [facility].     isosorbide mononitrate (IMDUR) 30 MG 24 hr tablet     Sig: Take 1 tablet (30 mg) by mouth daily.     Dispense:  90 tablet     Refill:  4     Keep on file until requested. Transfer from [facility].     metoprolol succinate ER (TOPROL XL) 25 MG 24 hr tablet     Sig: Take 3 tablets (75 mg) by mouth daily.     Dispense:  90 tablet     Refill:  4     Keep on file until requested. Transfer from [facility].     nitroGLYcerin (NITROSTAT) 0.4 MG sublingual tablet     Sig: For chest pain place 1 tablet under the tongue every 5 minutes for 3 doses. If symptoms persist 5 minutes after 1st dose call 911.     Dispense:  30 tablet     Refill:  1     Keep on file until requested. Transfer from [facility].     atorvastatin (LIPITOR) 40 MG tablet     Sig: Take 1 tablet (40 mg) by mouth daily.     Dispense:  90 tablet     Refill:  4     Keep on file until requested. Transfer from [facility].     lisinopril (ZESTRIL) 5 MG tablet     Sig: Take 1 tablet (5 mg) by mouth daily. Please fill. Dose reduction.     Dispense:  90 tablet     Refill:  3     Please fill. Dose reduction.     ticagrelor (BRILINTA) 90 MG tablet     Sig: Take 1 tablet (90 mg) by mouth 2 times  "daily. For heart stents.     Dispense:  180 tablet     Refill:  4     Keep on file until requested. Transfer from [facility].     aspirin (ASA) 81 MG chewable tablet     Sig: Take 1 tablet (81 mg) by mouth daily.     Dispense:  90 tablet     Refill:  3     Keep on file.     Medications Discontinued During This Encounter   Medication Reason     lisinopril (ZESTRIL) 10 MG tablet      ticagrelor (BRILINTA) 90 MG tablet Reorder (No AVS)     atorvastatin (LIPITOR) 40 MG tablet Reorder (No AVS)     nitroGLYcerin (NITROSTAT) 0.4 MG sublingual tablet Reorder (No AVS)     amLODIPine (NORVASC) 5 MG tablet Reorder (No AVS)     isosorbide mononitrate (IMDUR) 30 MG 24 hr tablet Reorder (No AVS)     metoprolol succinate ER (TOPROL XL) 25 MG 24 hr tablet Reorder (No AVS)     aspirin (ASA) 81 MG chewable tablet Reorder (No AVS)       Today's clinic visit entailed:  Review of external notes as documented elsewhere in note  Review of the result(s) of each unique test - Echo, cath, hospital summary, BMP  Ordering of each unique test  Prescription drug management  The level of medical decision making during this visit was of moderate complexity.           Physical Exam:   Vitals: /76   Pulse 96   Ht 1.727 m (5' 8\")   Wt 90.6 kg (199 lb 12.8 oz)   SpO2 97%   BMI 30.38 kg/m      Body mass index is 30.38 kg/m .  Wt Readings from Last 3 Encounters:   04/07/25 90.6 kg (199 lb 12.8 oz)   04/02/25 90.3 kg (199 lb)   03/30/25 91 kg (200 lb 11.2 oz)        General :   Alert and oriented, in no acute distress.    Respiratory:   Respirations unlabored. Clear bilaterally with no rales, rhonchi, or wheezes.     Cardiovascular:   Rhythm is regular. S1 and S2 are normal. No significant murmur is present. radial and PT pulses 2+   Extremities: Warm and dry, no lower edema   Neurologic: Moves all extremities, non focal    Psych:  Appropriate             Medications:     Current Outpatient Medications   Medication Sig Dispense Refill "     amLODIPine (NORVASC) 5 MG tablet Take 1 tablet (5 mg) by mouth at bedtime. 90 tablet 4     aspirin (ASA) 81 MG chewable tablet Take 1 tablet (81 mg) by mouth daily. 90 tablet 3     atorvastatin (LIPITOR) 40 MG tablet Take 1 tablet (40 mg) by mouth daily. 90 tablet 4     isosorbide mononitrate (IMDUR) 30 MG 24 hr tablet Take 1 tablet (30 mg) by mouth daily. 90 tablet 4     lisinopril (ZESTRIL) 5 MG tablet Take 1 tablet (5 mg) by mouth daily. Please fill. Dose reduction. 90 tablet 3     metoprolol succinate ER (TOPROL XL) 25 MG 24 hr tablet Take 3 tablets (75 mg) by mouth daily. 90 tablet 4     nitroGLYcerin (NITROSTAT) 0.4 MG sublingual tablet For chest pain place 1 tablet under the tongue every 5 minutes for 3 doses. If symptoms persist 5 minutes after 1st dose call 911. 30 tablet 1     ticagrelor (BRILINTA) 90 MG tablet Take 1 tablet (90 mg) by mouth 2 times daily. For heart stents. 180 tablet 4       Family History   Problem Relation Age of Onset     Hypertension Mother      Heart Failure Father      Hypertension Father      Ovarian Cancer Sister      Cerebrovascular Disease Maternal Grandmother      Diabetes Paternal Grandfather      Colon Cancer No family hx of        Social History     Socioeconomic History     Marital status: Single     Spouse name: Not on file     Number of children: Not on file     Years of education: Not on file     Highest education level: Not on file   Occupational History     Not on file   Tobacco Use     Smoking status: Former     Current packs/day: 0.00     Types: Cigarettes     Quit date:      Years since quittin.2     Smokeless tobacco: Not on file   Substance and Sexual Activity     Alcohol use: Yes     Comment: occ wine     Drug use: No     Sexual activity: Not on file   Other Topics Concern     Parent/sibling w/ CABG, MI or angioplasty before 65F 55M? Not Asked   Social History Narrative     Not on file     Social Drivers of Health     Financial Resource Strain: Low  Risk  (3/31/2025)    Financial Resource Strain      Within the past 12 months, have you or your family members you live with been unable to get utilities (heat, electricity) when it was really needed?: No   Food Insecurity: Low Risk  (3/31/2025)    Food Insecurity      Within the past 12 months, did you worry that your food would run out before you got money to buy more?: No      Within the past 12 months, did the food you bought just not last and you didn t have money to get more?: No   Transportation Needs: Low Risk  (3/31/2025)    Transportation Needs      Within the past 12 months, has lack of transportation kept you from medical appointments, getting your medicines, non-medical meetings or appointments, work, or from getting things that you need?: No   Physical Activity: Not on file   Stress: Not on file   Social Connections: Not on file   Interpersonal Safety: Low Risk  (3/27/2025)    Interpersonal Safety      Do you feel physically and emotionally safe where you currently live?: Yes      Within the past 12 months, have you been hit, slapped, kicked or otherwise physically hurt by someone?: No      Within the past 12 months, have you been humiliated or emotionally abused in other ways by your partner or ex-partner?: No   Housing Stability: Low Risk  (3/31/2025)    Housing Stability      Do you have housing? : Yes      Are you worried about losing your housing?: No          Past Medical History:     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26     Genital herpes      Schwannoma     diagnosed at age 32, excision performed            Past Surgical History:     Past Surgical History:   Procedure Laterality Date     BACK SURGERY  04/23/2002     CV CORONARY ANGIOGRAM N/A 3/17/2025    Procedure: Coronary Angiogram;  Surgeon: Juan Garrett MD;  Location: [facility] CARDIAC CATH LAB     CV CORONARY ANGIOGRAM N/A 3/27/2025    Procedure: Coronary Angiogram;  Surgeon: Paddy  Jayden REYES MD;  Location: [facility] CARDIAC CATH LAB     CV CORONARY ANGIOGRAM N/A 4/1/2025    Procedure: Coronary Angiogram;  Surgeon: Sukhdev Kunz MD;  Location: [facility] CARDIAC CATH LAB     CV INTRAVASULAR ULTRASOUND N/A 3/17/2025    Procedure: Intravascular Ultrasound;  Surgeon: Juan Garrett MD;  Location: [facility] CARDIAC CATH LAB     CV PCI N/A 3/17/2025    Procedure: Percutaneous Coronary Intervention;  Surgeon: Juan Garrett MD;  Location: [facility] CARDIAC CATH LAB     CV PCI N/A 3/27/2025    Procedure: Percutaneous Coronary Intervention;  Surgeon: Jayden Thomason MD;  Location: [facility] CARDIAC CATH LAB            Allergies:   Patient has no known allergies.       Data Reviewed today:   Most Recent Echocardiogram: 3/26/2025  Summary  Contrast was used without apparent complications. Normal transthoracic  echocardiogram. Compared to prior study, there is no significant change.    Left Ventricle  The left ventricle is normal in size. There is normal left ventricular wall  thickness. Left ventricular systolic function is normal. Normal left  ventricular wall motion. No evidence of left ventricular mass or tumors.    Coronary angiogram: 4/1/2025     Prox LAD lesion is 40% stenosed.     Mid Cx lesion is 35% stenosed.     Mid RCA lesion is 35% stenosed.     Prox RCA to Mid RCA lesion is 35% stenosed.     RV Branch lesion is 90% stenosed.     Dist LAD lesion is 90% stenosed.     1st Mrg lesion is 65% stenosed.     No change in coronary anatomy.  Recommend medical management    Left Main   The vessel is moderate in size. There was 0% vessel disease.      Left Anterior Descending   Prox LAD lesion is 40% stenosed.   Previously placed Mid LAD drug eluting stent is widely patent. Previous treatment took place <1 month ago. No thrombosis in the previous stent. No restenosis in the previous stent.   Dist LAD lesion is 90% stenosed.      Second Diagonal Branch   Previously placed 2nd Diag drug  "eluting stent is widely patent. The lesion is type A - low risk. The lesion was previously treated using angioplasty.      Left Circumflex   Mid Cx lesion is 35% stenosed.      First Obtuse Marginal Branch   1st Mrg lesion is 65% stenosed.      Right Coronary Artery   Prox RCA to Mid RCA lesion is 35% stenosed.   Mid RCA lesion is 35% stenosed.      Right Ventricular Branch   RV Branch lesion is 90% stenosed.          All laboratory data reviewed:    Recent Labs   Lab Test 03/16/25  1607   NTBNP 372       Lab Results   Component Value Date    WBC 12.3 (H) 03/31/2025    WBC 8.8 03/11/2010    RBC 4.37 (L) 03/31/2025    RBC 5.48 03/11/2010    HGB 13.9 03/31/2025    HGB 17.8 (H) 03/11/2010    HCT 38.0 (L) 03/31/2025    HCT 48.0 03/11/2010    MCV 87 03/31/2025    MCV 88 03/11/2010    MCH 31.8 03/31/2025    MCH 32.5 03/11/2010    MCHC 36.6 (H) 03/31/2025    MCHC 37.1 (H) 03/11/2010    RDW 12.0 03/31/2025    RDW 12.8 03/11/2010     03/31/2025     03/11/2010       Lab Results   Component Value Date     04/02/2025     03/11/2010    POTASSIUM 4.3 04/02/2025    POTASSIUM 3.4 03/11/2010    CHLORIDE 106 04/02/2025    CHLORIDE 104 03/11/2010    CO2 21 (L) 04/02/2025    CO2 28 03/11/2010    ANIONGAP 14 04/02/2025    ANIONGAP 8 03/11/2010     (H) 04/02/2025     (H) 03/27/2025     (H) 03/11/2010    BUN 25.6 (H) 04/02/2025    BUN 13 03/11/2010    CR 1.38 (H) 04/02/2025    CR 1.03 03/11/2010    GFRESTIMATED 57 (L) 04/02/2025    GFRESTIMATED 77 03/11/2010    GFRESTBLACK >90 03/11/2010    PATRIA 8.9 04/02/2025    PATRIA 9.2 03/11/2010      Lab Results   Component Value Date    AST 27 03/31/2025    ALT 36 03/31/2025       No results found for: \"A1C\"    The longitudinal plan of care for Fredy was addressed during this visit. Due to the added complexity in care, I will continue to support Fredy in the subsequent management of this condition(s) and with the ongoing continuity of care of this " condition(s).    This note has been dictated using voice recognition software. Any grammatical, typographical, or context distortions are unintentional and inherent to the software.      Thank you for allowing me to participate in the care of your patient.      Sincerely,     LETICIA Sullivan [facility]  cc:   Nunu Burden, PA-C  [address]  [place] [id number]

## 2025-04-07 NOTE — PROGRESS NOTES
Cardiology Clinic Follow up     Fredy Chow MRN# [id number]   YOB: 1960 Age: 64 year old     Primary cardiologist: Dr. Calzada  (initial hospital consult)    Reason for visit: Post hospital follow up    History of presenting illness:    Fredy Chow is a pleasant 64 year old male with past medical history significant for HTN, CKD, hyperlipidemia, recent NSTEMI 3/17/2025 s/p PCI DILAN x2 to mid-distal LAD (serial lesions 80% and 100%); residual moderate disease in his RCA and possibly obstructive lesions in his smallish RPL RV marginal branches who was re-admitted on 3/25/2025 with NSTEMI. Presented with diaphoresis and chest pain relieved with nitroglycerin. HS troponin 270 (down from 337 on 3/17/25). Started on IV heparin. Echocardiogram showed stable normal LVEF and no effusion.  Underwent coronary angiogram, previous mid LAD DILAN patent with no restenosis s/p PCI DILAN to 2nd mid diag (80%). RV branch (90%) not intervened due to smaller system and renal insuffiencey/dye load. He was monitored over the weekend on antianginals and discharged home.    He subsequently presented back a day later on 3/31/25 with atypical chest pain and bilateral arm paresthesias which spontaneously improved. ECG with some ST/T wave abnormalities. He underwent repeat coronary angiogram on 4/1/25 which showed no change in coronary anatomy - patent mid LAD stent and 2nd diag stent. Distal LAD 90% stenosis and RV branch 90% stenosis. Recommendation for medical management.     Presents today for post hospital follow up.    Today, Fredy presents for follow up. Since discharge he has been feeling a little lightheaded. Blood pressure after cardiac rehab today 90/62. Denies any chest pain, dyspnea or LE edema. Occasional palpitations when lying down to sleep at night.  Radial and groin sites healed. Wonders about ED medication use. Inquires about alcohol use. Stopped drinking pop. Making diet changes.  Weight is down.             Assessment and Plan:     ASSESSMENT:    CAD, recent NSTEMI s/p PCI DESx3 to mid-distal LAD (3/17/25), presented back with NSTEMI/USA s/p PCI to 2nd mid Diag (80%) 3/27/2025  -RV branch (90%) not intervened due to smaller system, antianginals were added. Presented back again on 3/31/25 with atypical chest pain and bilateral arm paresthesias which spontaneously improved. ECG with some ST/T wave abnormalities. He underwent repeat coronary angiogram on 4/1/25 which showed no change in coronary anatomy - patent mid LAD stent and 2nd diag stent. Distal LAD 90% stenosis and RV branch 90% stenosis. Recommendation for medical management.   -Denies any recurrence of chest pain or dyspnea   -Continue dual antiplatelet medications with aspirin and Brilinta 90mg twice daily for 1 year (through March 2026)  -Additional antianginals added post PCI with amlodipine 5mg, metoprolol XL 75mg, Imdur 30mg  -Avoid PDE5-i use while on Imdur. Continue imdur for now, consider trial off after 1 year  -Continue atorvastatin 40mg daily - consider increase to 80mg in follow up  -Continue cardiac rehab on Mondays     HTN  Orthostasis  -With addition of antianginals will reduce lisinopril to 5mg  -Continue on amlodipine 5mg, Imdur 30mg, metoprolol XL 75mg  -Cautioned to ensure hydration, limit alcohol use to 1 standard beer or wine on occasion and possible increased alcohol effect    Hyperlipidemia  - (3/3/25) - -LDL goal < 55  -Continues on atorvastatin 40mg, consider dose increase to 80mg in follow up  -Repeat fasting lipids in 3 months     ANA/CKD  -Reduce lisinopril to 5mg  -Creatinine 1.38 (4/2/25)  -Repeat BMP with PCP visit upcoming       PLAN:     Reduce lisinopril to 5mg once daily - consider stopping if ongoing orthostasis or SBP < 110  Continue other current medications: aspirin and Brilinta 90mg twice daily for 1 year, amlodipine, metoprolol, Imdur, atorvastatin  All prescriptions were transferred to  [facility] today   Continue cardiac rehab on Mondays  Follow up in 2 weeks          Michelle Max, DNP, APRN, CNP  [facility]     Orders this Visit:  Orders Placed This Encounter   Procedures    Follow-Up with Cardiology [facility]     Orders Placed This Encounter   Medications    amLODIPine (NORVASC) 5 MG tablet     Sig: Take 1 tablet (5 mg) by mouth at bedtime.     Dispense:  90 tablet     Refill:  4     Keep on file until requested. Transfer from [facility].    isosorbide mononitrate (IMDUR) 30 MG 24 hr tablet     Sig: Take 1 tablet (30 mg) by mouth daily.     Dispense:  90 tablet     Refill:  4     Keep on file until requested. Transfer from [facility].    metoprolol succinate ER (TOPROL XL) 25 MG 24 hr tablet     Sig: Take 3 tablets (75 mg) by mouth daily.     Dispense:  90 tablet     Refill:  4     Keep on file until requested. Transfer from [facility].    nitroGLYcerin (NITROSTAT) 0.4 MG sublingual tablet     Sig: For chest pain place 1 tablet under the tongue every 5 minutes for 3 doses. If symptoms persist 5 minutes after 1st dose call 911.     Dispense:  30 tablet     Refill:  1     Keep on file until requested. Transfer from [facility].    atorvastatin (LIPITOR) 40 MG tablet     Sig: Take 1 tablet (40 mg) by mouth daily.     Dispense:  90 tablet     Refill:  4     Keep on file until requested. Transfer from [facility].    lisinopril (ZESTRIL) 5 MG tablet     Sig: Take 1 tablet (5 mg) by mouth daily. Please fill. Dose reduction.     Dispense:  90 tablet     Refill:  3     Please fill. Dose reduction.    ticagrelor (BRILINTA) 90 MG tablet     Sig: Take 1 tablet (90 mg) by mouth 2 times daily. For heart stents.     Dispense:  180 tablet     Refill:  4     Keep on file until requested. Transfer from [facility].    aspirin (ASA) 81 MG chewable tablet     Sig: Take 1 tablet (81 mg) by mouth daily.     Dispense:  90 tablet     Refill:  3      "Keep on file.     Medications Discontinued During This Encounter   Medication Reason    lisinopril (ZESTRIL) 10 MG tablet     ticagrelor (BRILINTA) 90 MG tablet Reorder (No AVS)    atorvastatin (LIPITOR) 40 MG tablet Reorder (No AVS)    nitroGLYcerin (NITROSTAT) 0.4 MG sublingual tablet Reorder (No AVS)    amLODIPine (NORVASC) 5 MG tablet Reorder (No AVS)    isosorbide mononitrate (IMDUR) 30 MG 24 hr tablet Reorder (No AVS)    metoprolol succinate ER (TOPROL XL) 25 MG 24 hr tablet Reorder (No AVS)    aspirin (ASA) 81 MG chewable tablet Reorder (No AVS)       Today's clinic visit entailed:  Review of external notes as documented elsewhere in note  Review of the result(s) of each unique test - Echo, cath, hospital summary, BMP  Ordering of each unique test  Prescription drug management  The level of medical decision making during this visit was of moderate complexity.           Physical Exam:   Vitals: /76   Pulse 96   Ht 1.727 m (5' 8\")   Wt 90.6 kg (199 lb 12.8 oz)   SpO2 97%   BMI 30.38 kg/m      Body mass index is 30.38 kg/m .  Wt Readings from Last 3 Encounters:   04/07/25 90.6 kg (199 lb 12.8 oz)   04/02/25 90.3 kg (199 lb)   03/30/25 91 kg (200 lb 11.2 oz)        General :   Alert and oriented, in no acute distress.    Respiratory:   Respirations unlabored. Clear bilaterally with no rales, rhonchi, or wheezes.     Cardiovascular:   Rhythm is regular. S1 and S2 are normal. No significant murmur is present. radial and PT pulses 2+   Extremities: Warm and dry, no lower edema   Neurologic: Moves all extremities, non focal    Psych:  Appropriate             Medications:     Current Outpatient Medications   Medication Sig Dispense Refill    amLODIPine (NORVASC) 5 MG tablet Take 1 tablet (5 mg) by mouth at bedtime. 90 tablet 4    aspirin (ASA) 81 MG chewable tablet Take 1 tablet (81 mg) by mouth daily. 90 tablet 3    atorvastatin (LIPITOR) 40 MG tablet Take 1 tablet (40 mg) by mouth daily. 90 tablet 4    " isosorbide mononitrate (IMDUR) 30 MG 24 hr tablet Take 1 tablet (30 mg) by mouth daily. 90 tablet 4    lisinopril (ZESTRIL) 5 MG tablet Take 1 tablet (5 mg) by mouth daily. Please fill. Dose reduction. 90 tablet 3    metoprolol succinate ER (TOPROL XL) 25 MG 24 hr tablet Take 3 tablets (75 mg) by mouth daily. 90 tablet 4    nitroGLYcerin (NITROSTAT) 0.4 MG sublingual tablet For chest pain place 1 tablet under the tongue every 5 minutes for 3 doses. If symptoms persist 5 minutes after 1st dose call 911. 30 tablet 1    ticagrelor (BRILINTA) 90 MG tablet Take 1 tablet (90 mg) by mouth 2 times daily. For heart stents. 180 tablet 4       Family History   Problem Relation Age of Onset    Hypertension Mother     Heart Failure Father     Hypertension Father     Ovarian Cancer Sister     Cerebrovascular Disease Maternal Grandmother     Diabetes Paternal Grandfather     Colon Cancer No family hx of        Social History     Socioeconomic History    Marital status: Single     Spouse name: Not on file    Number of children: Not on file    Years of education: Not on file    Highest education level: Not on file   Occupational History    Not on file   Tobacco Use    Smoking status: Former     Current packs/day: 0.00     Types: Cigarettes     Quit date:      Years since quittin.2    Smokeless tobacco: Not on file   Substance and Sexual Activity    Alcohol use: Yes     Comment: occ wine    Drug use: No    Sexual activity: Not on file   Other Topics Concern    Parent/sibling w/ CABG, MI or angioplasty before 65F 55M? Not Asked   Social History Narrative    Not on file     Social Drivers of Health     Financial Resource Strain: Low Risk  (3/31/2025)    Financial Resource Strain     Within the past 12 months, have you or your family members you live with been unable to get utilities (heat, electricity) when it was really needed?: No   Food Insecurity: Low Risk  (3/31/2025)    Food Insecurity     Within the past 12 months, did  you worry that your food would run out before you got money to buy more?: No     Within the past 12 months, did the food you bought just not last and you didn t have money to get more?: No   Transportation Needs: Low Risk  (3/31/2025)    Transportation Needs     Within the past 12 months, has lack of transportation kept you from medical appointments, getting your medicines, non-medical meetings or appointments, work, or from getting things that you need?: No   Physical Activity: Not on file   Stress: Not on file   Social Connections: Not on file   Interpersonal Safety: Low Risk  (3/27/2025)    Interpersonal Safety     Do you feel physically and emotionally safe where you currently live?: Yes     Within the past 12 months, have you been hit, slapped, kicked or otherwise physically hurt by someone?: No     Within the past 12 months, have you been humiliated or emotionally abused in other ways by your partner or ex-partner?: No   Housing Stability: Low Risk  (3/31/2025)    Housing Stability     Do you have housing? : Yes     Are you worried about losing your housing?: No          Past Medical History:     Past Medical History:   Diagnosis Date    Anal condyloma     diagnosed at approx age 26    Genital herpes     Schwannoma     diagnosed at age 32, excision performed            Past Surgical History:     Past Surgical History:   Procedure Laterality Date    BACK SURGERY  04/23/2002    CV CORONARY ANGIOGRAM N/A 3/17/2025    Procedure: Coronary Angiogram;  Surgeon: Juan Garrett MD;  Location: [facility] CARDIAC CATH LAB    CV CORONARY ANGIOGRAM N/A 3/27/2025    Procedure: Coronary Angiogram;  Surgeon: Jayden Thomason MD;  Location: [facility] CARDIAC CATH LAB    CV CORONARY ANGIOGRAM N/A 4/1/2025    Procedure: Coronary Angiogram;  Surgeon: Sukhdev Kunz MD;  Location: [facility] CARDIAC CATH LAB    CV INTRAVASULAR ULTRASOUND N/A 3/17/2025    Procedure: Intravascular Ultrasound;  Surgeon: Juan Garrett MD;   Location: [facility] CARDIAC CATH LAB    CV PCI N/A 3/17/2025    Procedure: Percutaneous Coronary Intervention;  Surgeon: Juan Garrett MD;  Location: [facility] CARDIAC CATH LAB    CV PCI N/A 3/27/2025    Procedure: Percutaneous Coronary Intervention;  Surgeon: Jayden Thomason MD;  Location: [facility] CARDIAC CATH LAB            Allergies:   Patient has no known allergies.       Data Reviewed today:   Most Recent Echocardiogram: 3/26/2025  Summary  Contrast was used without apparent complications. Normal transthoracic  echocardiogram. Compared to prior study, there is no significant change.    Left Ventricle  The left ventricle is normal in size. There is normal left ventricular wall  thickness. Left ventricular systolic function is normal. Normal left  ventricular wall motion. No evidence of left ventricular mass or tumors.    Coronary angiogram: 4/1/2025    Prox LAD lesion is 40% stenosed.    Mid Cx lesion is 35% stenosed.    Mid RCA lesion is 35% stenosed.    Prox RCA to Mid RCA lesion is 35% stenosed.    RV Branch lesion is 90% stenosed.    Dist LAD lesion is 90% stenosed.    1st Mrg lesion is 65% stenosed.     No change in coronary anatomy.  Recommend medical management    Left Main   The vessel is moderate in size. There was 0% vessel disease.      Left Anterior Descending   Prox LAD lesion is 40% stenosed.   Previously placed Mid LAD drug eluting stent is widely patent. Previous treatment took place <1 month ago. No thrombosis in the previous stent. No restenosis in the previous stent.   Dist LAD lesion is 90% stenosed.      Second Diagonal Branch   Previously placed 2nd Diag drug eluting stent is widely patent. The lesion is type A - low risk. The lesion was previously treated using angioplasty.      Left Circumflex   Mid Cx lesion is 35% stenosed.      First Obtuse Marginal Branch   1st Mrg lesion is 65% stenosed.      Right Coronary Artery   Prox RCA to Mid RCA lesion is 35% stenosed.   Mid RCA lesion  "is 35% stenosed.      Right Ventricular Branch   RV Branch lesion is 90% stenosed.          All laboratory data reviewed:    Recent Labs   Lab Test 03/16/25  1607   NTBNP 372       Lab Results   Component Value Date    WBC 12.3 (H) 03/31/2025    WBC 8.8 03/11/2010    RBC 4.37 (L) 03/31/2025    RBC 5.48 03/11/2010    HGB 13.9 03/31/2025    HGB 17.8 (H) 03/11/2010    HCT 38.0 (L) 03/31/2025    HCT 48.0 03/11/2010    MCV 87 03/31/2025    MCV 88 03/11/2010    MCH 31.8 03/31/2025    MCH 32.5 03/11/2010    MCHC 36.6 (H) 03/31/2025    MCHC 37.1 (H) 03/11/2010    RDW 12.0 03/31/2025    RDW 12.8 03/11/2010     03/31/2025     03/11/2010       Lab Results   Component Value Date     04/02/2025     03/11/2010    POTASSIUM 4.3 04/02/2025    POTASSIUM 3.4 03/11/2010    CHLORIDE 106 04/02/2025    CHLORIDE 104 03/11/2010    CO2 21 (L) 04/02/2025    CO2 28 03/11/2010    ANIONGAP 14 04/02/2025    ANIONGAP 8 03/11/2010     (H) 04/02/2025     (H) 03/27/2025     (H) 03/11/2010    BUN 25.6 (H) 04/02/2025    BUN 13 03/11/2010    CR 1.38 (H) 04/02/2025    CR 1.03 03/11/2010    GFRESTIMATED 57 (L) 04/02/2025    GFRESTIMATED 77 03/11/2010    GFRESTBLACK >90 03/11/2010    PATRIA 8.9 04/02/2025    APTRIA 9.2 03/11/2010      Lab Results   Component Value Date    AST 27 03/31/2025    ALT 36 03/31/2025       No results found for: \"A1C\"    The longitudinal plan of care for Fredy was addressed during this visit. Due to the added complexity in care, I will continue to support Fredy in the subsequent management of this condition(s) and with the ongoing continuity of care of this condition(s).    This note has been dictated using voice recognition software. Any grammatical, typographical, or context distortions are unintentional and inherent to the software.  "

## 2025-04-07 NOTE — PATIENT INSTRUCTIONS
Medication changes and/or recommendations discussed today:     Reduce Lisinopril to 5mg once daily to help reduce effect of lightheadedness.     Prescriptions were all sent to [facility].    Continue cardiac rehab on Mondays         Follow up plan:   - 4/21 at 11:20 I asked scheduling to schedule      If you have questions or concerns in the meantime please call my nurse team at [phone] or send a [employer] message for non urgent requests.       ________________________________    LETICIA Green, CNP    [facility]

## 2025-04-09 ENCOUNTER — HOSPITAL ENCOUNTER (OUTPATIENT)
Dept: CARDIAC REHAB | Facility: CLINIC | Age: 65
Discharge: HOME | End: 2025-04-09
Attending: HOSPITALIST
Payer: COMMERCIAL

## 2025-04-09 DIAGNOSIS — I21.4 NSTEMI (NON-ST ELEVATED MYOCARDIAL INFARCTION) (H): ICD-10-CM

## 2025-04-09 PROCEDURE — 93798 PHYS/QHP OP CAR RHAB W/ECG: CPT | Performed by: OCCUPATIONAL THERAPIST

## 2025-04-09 PROCEDURE — 93797 PHYS/QHP OP CAR RHAB WO ECG: CPT | Performed by: OCCUPATIONAL THERAPIST

## 2025-04-14 ENCOUNTER — HOSPITAL ENCOUNTER (OUTPATIENT)
Dept: CARDIAC REHAB | Facility: CLINIC | Age: 65
Discharge: HOME | End: 2025-04-14
Attending: HOSPITALIST
Payer: COMMERCIAL

## 2025-04-14 PROCEDURE — 93798 PHYS/QHP OP CAR RHAB W/ECG: CPT

## 2025-04-21 ENCOUNTER — HOSPITAL ENCOUNTER (OUTPATIENT)
Dept: CARDIAC REHAB | Facility: CLINIC | Age: 65
Discharge: HOME | End: 2025-04-21
Attending: HOSPITALIST
Payer: COMMERCIAL

## 2025-04-21 ENCOUNTER — OFFICE VISIT (OUTPATIENT)
Dept: CARDIOLOGY | Facility: CLINIC | Age: 65
End: 2025-04-21
Payer: COMMERCIAL

## 2025-04-21 VITALS
DIASTOLIC BLOOD PRESSURE: 64 MMHG | HEART RATE: 89 BPM | OXYGEN SATURATION: 98 % | SYSTOLIC BLOOD PRESSURE: 99 MMHG | WEIGHT: 198 LBS | HEIGHT: 68 IN | BODY MASS INDEX: 30.01 KG/M2

## 2025-04-21 DIAGNOSIS — R07.9 CHEST PAIN, UNSPECIFIED TYPE: ICD-10-CM

## 2025-04-21 DIAGNOSIS — R25.2 CRAMP OF LIMB: Primary | ICD-10-CM

## 2025-04-21 DIAGNOSIS — I25.10 CORONARY ARTERY DISEASE INVOLVING NATIVE CORONARY ARTERY OF NATIVE HEART WITHOUT ANGINA PECTORIS: ICD-10-CM

## 2025-04-21 PROCEDURE — 93798 PHYS/QHP OP CAR RHAB W/ECG: CPT

## 2025-04-21 PROCEDURE — 93797 PHYS/QHP OP CAR RHAB WO ECG: CPT

## 2025-04-21 RX ORDER — ISOSORBIDE MONONITRATE 60 MG/1
60 TABLET, EXTENDED RELEASE ORAL DAILY
Qty: 90 TABLET | Refills: 1 | Status: SHIPPED | OUTPATIENT
Start: 2025-04-21

## 2025-04-21 NOTE — LETTER
4/21/2025    Caitlyn Clements MD  [facility] [address] [place] [address]    RE: Fredy Chow       Dear Colleague,     I had the pleasure of seeing Fredy Chow in the [facility].        Cardiology Clinic Follow up     Fredy Chow MRN# [id number]   YOB: 1960 Age: 64 year old     Primary cardiologist: Dr. Calzada  (initial hospital consult)    Reason for visit 2 week follow up    History of presenting illness:    Fredy Chow is a pleasant 64 year old male with past medical history significant for HTN, CKD, hyperlipidemia, recent NSTEMI 3/17/2025 s/p PCI DILAN x2 to mid-distal LAD (serial lesions 80% and 100%); residual moderate disease in his RCA and possibly obstructive lesions in his smallish RPL RV marginal branches who was re-admitted on 3/25/2025 with NSTEMI. Presented with diaphoresis and chest pain relieved with nitroglycerin. HS troponin 270 (down from 337 on 3/17/25). Started on IV heparin. Echocardiogram showed stable normal LVEF and no effusion.  Underwent coronary angiogram, previous mid LAD DILAN patent with no restenosis s/p PCI DILAN to 2nd mid diag (80%). RV branch (90%) not intervened due to smaller system and renal insuffiencey/dye load. He was monitored over the weekend on antianginals and discharged home.    He subsequently presented back a day later on 3/31/25 with atypical chest pain and bilateral arm paresthesias which spontaneously improved. ECG with some ST/T wave abnormalities. He underwent repeat coronary angiogram on 4/1/25 which showed no change in coronary anatomy - patent mid LAD stent and 2nd diag stent. Distal LAD 90% stenosis and RV branch 90% stenosis. Recommendation for medical management.     Seen last in office with myself 4/7/25. Feeling a little lightheaded since hospital discharge. Blood pressure after cardiac rehab was running lower 90/62. Lisinopril reduced to 5mg. No chset  pain. Occasional palpitations. Making diet changes.     Today, Fredy presents for follow up. Just got completed with cardiac rehab this morning (5th session). Resting /62, ending /60. Up to 3.0 METs. He had one episode of chest pain this weekend he took 1 nitroglycerin for with relief. He often sleeps with his arms curled in and noticed some tingling to left arm and hand pain, this has come and gone.  No symptoms at cardiac rehab. No edema, occasional calf cramping. Has a bruise on left wrist from his watch. Lightheadedness better on less lisinopril.            Assessment and Plan:     ASSESSMENT:    CAD, recent NSTEMI s/p PCI DESx3 to mid-distal LAD (3/17/25), presented back with NSTEMI/USA s/p PCI to 2nd mid Diag (80%) 3/27/2025  -RV branch (90%) not intervened due to smaller system, antianginals were added. Presented back again on 3/31/25 with atypical chest pain and bilateral arm paresthesias which spontaneously improved. ECG with some ST/T wave abnormalities. He underwent repeat coronary angiogram on 4/1/25 which showed no change in coronary anatomy - patent mid LAD stent and 2nd diag stent. Distal LAD 90% stenosis and RV branch 90% stenosis. Recommendation for medical management.   -1 episode of chest pain this weekend relieved with 1 nitroglycerin. Some atypical left arm tinging/left hand discomfort, possibly positional, comes and goes.  No symptoms at cardiac rehab   -Continue dual antiplatelet medications with aspirin and Brilinta 90mg twice daily for 1 year (through March 2026)  -Additional antianginals added post PCI with amlodipine 5mg, metoprolol XL 75mg, imdur   -Increase Imdur to 60mg. Stop lisinopril for BP room.   -Avoid PDE5-i use while on Imdur. Continue imdur for now, consider trial off after 1 year  -Continue atorvastatin 40mg daily - consider increase to 80mg in follow up  -Continue cardiac rehab on Mondays     HTN  Orthostasis  -With addition of antianginals reduced lisinopril to  "5mg - will stop lisinopril BP 99/64 today.  -Continue on amlodipine 5mg, Imdur 30mg, metoprolol XL 75mg  -Cautioned to ensure hydration, limit alcohol use to 1 standard beer or wine on occasion and possible increased alcohol effect    Hyperlipidemia  - (3/3/25) - -LDL goal < 55  -Continues on atorvastatin 40mg, consider dose increase to 80mg in follow up  -Repeat fasting lipids in 3 months     ANA/CKD  -Reduce lisinopril to 5mg  -Creatinine 1.38 (4/2/25)  -Repeat BMP with PCP visit upcoming       PLAN:     Stop Lisinopril   Increase Imdur to 60mg   Continue other current medications: aspirin and Brilinta 90mg twice daily for 1 year, amlodipine, metoprolol, Imdur, atorvastatin  Continue cardiac rehab on Mondays  PAD screen with ABIs in legs  Follow up 4 weeks           Michelle Max, DNP, APRN, CNP  [facility] [place] [facility]     Orders this Visit:  Orders Placed This Encounter   Procedures     US ORIN Doppler with Exercise Bilateral     Follow-Up with Cardiology- [facility]     Orders Placed This Encounter   Medications     isosorbide mononitrate (IMDUR) 60 MG 24 hr tablet     Sig: Take 1 tablet (60 mg) by mouth daily.     Dispense:  90 tablet     Refill:  1     Dose increase.     Medications Discontinued During This Encounter   Medication Reason     isosorbide mononitrate (IMDUR) 30 MG 24 hr tablet      lisinopril (ZESTRIL) 5 MG tablet        Today's clinic visit entailed:  Review of external notes as documented elsewhere in note  Review of the result(s) of each unique test - cardiac rehab notes,  BMP  Ordering of each unique test  Prescription drug management  The level of medical decision making during this visit was of moderate complexity.           Physical Exam:   Vitals: BP 99/64   Pulse 89   Ht 1.727 m (5' 8\")   Wt 89.8 kg (198 lb)   SpO2 98%   BMI 30.11 kg/m      Body mass index is 30.11 kg/m .  Wt Readings from Last 3 Encounters:   04/21/25 89.8 kg (198 lb)   04/07/25 90.6 kg (199 lb " 12.8 oz)   04/02/25 90.3 kg (199 lb)        General :   Alert and oriented, in no acute distress.    Respiratory:   Respirations unlabored. Clear bilaterally with no rales, rhonchi, or wheezes.     Cardiovascular:   Rhythm is regular. S1 and S2 are normal. No significant murmur is present. radial pulses normal    Extremities: Warm and dry, no lower edema   Neurologic: Moves all extremities, non focal    Psych:  Appropriate             Medications:     Current Outpatient Medications   Medication Sig Dispense Refill     amLODIPine (NORVASC) 5 MG tablet Take 1 tablet (5 mg) by mouth at bedtime. 90 tablet 4     aspirin (ASA) 81 MG chewable tablet Take 1 tablet (81 mg) by mouth daily. 90 tablet 3     atorvastatin (LIPITOR) 40 MG tablet Take 1 tablet (40 mg) by mouth daily. 90 tablet 4     isosorbide mononitrate (IMDUR) 60 MG 24 hr tablet Take 1 tablet (60 mg) by mouth daily. 90 tablet 1     metoprolol succinate ER (TOPROL XL) 25 MG 24 hr tablet Take 3 tablets (75 mg) by mouth daily. 90 tablet 4     ticagrelor (BRILINTA) 90 MG tablet Take 1 tablet (90 mg) by mouth 2 times daily. For heart stents. 180 tablet 4     nitroGLYcerin (NITROSTAT) 0.4 MG sublingual tablet For chest pain place 1 tablet under the tongue every 5 minutes for 3 doses. If symptoms persist 5 minutes after 1st dose call 911. (Patient not taking: Reported on 4/21/2025) 30 tablet 1       Family History   Problem Relation Age of Onset     Hypertension Mother      Heart Failure Father      Hypertension Father      Ovarian Cancer Sister      Cerebrovascular Disease Maternal Grandmother      Diabetes Paternal Grandfather      Colon Cancer No family hx of        Social History     Socioeconomic History     Marital status: Single     Spouse name: Not on file     Number of children: Not on file     Years of education: Not on file     Highest education level: Not on file   Occupational History     Not on file   Tobacco Use     Smoking status: Former     Current  packs/day: 0.00     Types: Cigarettes     Quit date:      Years since quittin.3     Smokeless tobacco: Not on file   Substance and Sexual Activity     Alcohol use: Yes     Comment: occ wine     Drug use: No     Sexual activity: Not on file   Other Topics Concern     Parent/sibling w/ CABG, MI or angioplasty before 65F 55M? Not Asked   Social History Narrative     Not on file     Social Drivers of Health     Financial Resource Strain: Low Risk  (3/31/2025)    Financial Resource Strain      Within the past 12 months, have you or your family members you live with been unable to get utilities (heat, electricity) when it was really needed?: No   Food Insecurity: Low Risk  (3/31/2025)    Food Insecurity      Within the past 12 months, did you worry that your food would run out before you got money to buy more?: No      Within the past 12 months, did the food you bought just not last and you didn t have money to get more?: No   Transportation Needs: Low Risk  (3/31/2025)    Transportation Needs      Within the past 12 months, has lack of transportation kept you from medical appointments, getting your medicines, non-medical meetings or appointments, work, or from getting things that you need?: No   Physical Activity: Not on file   Stress: Not on file   Social Connections: Not on file   Interpersonal Safety: Low Risk  (3/27/2025)    Interpersonal Safety      Do you feel physically and emotionally safe where you currently live?: Yes      Within the past 12 months, have you been hit, slapped, kicked or otherwise physically hurt by someone?: No      Within the past 12 months, have you been humiliated or emotionally abused in other ways by your partner or ex-partner?: No   Housing Stability: Low Risk  (3/31/2025)    Housing Stability      Do you have housing? : Yes      Are you worried about losing your housing?: No          Past Medical History:     Past Medical History:   Diagnosis Date     Anal condyloma      diagnosed at approx age 26     Genital herpes      Schwannoma     diagnosed at age 32, excision performed            Past Surgical History:     Past Surgical History:   Procedure Laterality Date     BACK SURGERY  04/23/2002     CV CORONARY ANGIOGRAM N/A 3/17/2025    Procedure: Coronary Angiogram;  Surgeon: Juan Garrett MD;  Location: [facility] CARDIAC CATH LAB     CV CORONARY ANGIOGRAM N/A 3/27/2025    Procedure: Coronary Angiogram;  Surgeon: Jayden Thomason MD;  Location: [facility] CARDIAC CATH LAB     CV CORONARY ANGIOGRAM N/A 4/1/2025    Procedure: Coronary Angiogram;  Surgeon: Sukhdev Kunz MD;  Location: [facility] CARDIAC CATH LAB     CV INTRAVASULAR ULTRASOUND N/A 3/17/2025    Procedure: Intravascular Ultrasound;  Surgeon: Juan Garrett MD;  Location: [facility] CARDIAC CATH LAB     CV PCI N/A 3/17/2025    Procedure: Percutaneous Coronary Intervention;  Surgeon: Juan Garrett MD;  Location: [facility] CARDIAC CATH LAB     CV PCI N/A 3/27/2025    Procedure: Percutaneous Coronary Intervention;  Surgeon: Jayden Thomason MD;  Location: [facility] CARDIAC CATH LAB            Allergies:   Patient has no known allergies.       Data Reviewed today:   Most Recent Echocardiogram: 3/26/2025  Summary  Contrast was used without apparent complications. Normal transthoracic  echocardiogram. Compared to prior study, there is no significant change.    Left Ventricle  The left ventricle is normal in size. There is normal left ventricular wall  thickness. Left ventricular systolic function is normal. Normal left  ventricular wall motion. No evidence of left ventricular mass or tumors.    Coronary angiogram: 4/1/2025     Prox LAD lesion is 40% stenosed.     Mid Cx lesion is 35% stenosed.     Mid RCA lesion is 35% stenosed.     Prox RCA to Mid RCA lesion is 35% stenosed.     RV Branch lesion is 90% stenosed.     Dist LAD lesion is 90% stenosed.     1st Mrg lesion is 65% stenosed.     No change in coronary  anatomy.  Recommend medical management    Left Main   The vessel is moderate in size. There was 0% vessel disease.      Left Anterior Descending   Prox LAD lesion is 40% stenosed.   Previously placed Mid LAD drug eluting stent is widely patent. Previous treatment took place <1 month ago. No thrombosis in the previous stent. No restenosis in the previous stent.   Dist LAD lesion is 90% stenosed.      Second Diagonal Branch   Previously placed 2nd Diag drug eluting stent is widely patent. The lesion is type A - low risk. The lesion was previously treated using angioplasty.      Left Circumflex   Mid Cx lesion is 35% stenosed.      First Obtuse Marginal Branch   1st Mrg lesion is 65% stenosed.      Right Coronary Artery   Prox RCA to Mid RCA lesion is 35% stenosed.   Mid RCA lesion is 35% stenosed.      Right Ventricular Branch   RV Branch lesion is 90% stenosed.          All laboratory data reviewed:    Recent Labs   Lab Test 03/16/25  1607   NTBNP 372       Lab Results   Component Value Date    WBC 12.3 (H) 03/31/2025    WBC 8.8 03/11/2010    RBC 4.37 (L) 03/31/2025    RBC 5.48 03/11/2010    HGB 13.9 03/31/2025    HGB 17.8 (H) 03/11/2010    HCT 38.0 (L) 03/31/2025    HCT 48.0 03/11/2010    MCV 87 03/31/2025    MCV 88 03/11/2010    MCH 31.8 03/31/2025    MCH 32.5 03/11/2010    MCHC 36.6 (H) 03/31/2025    MCHC 37.1 (H) 03/11/2010    RDW 12.0 03/31/2025    RDW 12.8 03/11/2010     03/31/2025     03/11/2010       Lab Results   Component Value Date     04/02/2025     03/11/2010    POTASSIUM 4.3 04/02/2025    POTASSIUM 3.4 03/11/2010    CHLORIDE 106 04/02/2025    CHLORIDE 104 03/11/2010    CO2 21 (L) 04/02/2025    CO2 28 03/11/2010    ANIONGAP 14 04/02/2025    ANIONGAP 8 03/11/2010     (H) 04/02/2025     (H) 03/27/2025     (H) 03/11/2010    BUN 25.6 (H) 04/02/2025    BUN 13 03/11/2010    CR 1.38 (H) 04/02/2025    CR 1.03 03/11/2010    GFRESTIMATED 57 (L) 04/02/2025     "GFRESTIMATED 77 03/11/2010    GFRESTBLACK >90 03/11/2010    PATRIA 8.9 04/02/2025    PATRIA 9.2 03/11/2010      Lab Results   Component Value Date    AST 27 03/31/2025    ALT 36 03/31/2025       No results found for: \"A1C\"    The longitudinal plan of care for Fredy was addressed during this visit. Due to the added complexity in care, I will continue to support Fredy in the subsequent management of this condition(s) and with the ongoing continuity of care of this condition(s).    This note has been dictated using voice recognition software. Any grammatical, typographical, or context distortions are unintentional and inherent to the software.    Thank you for allowing me to participate in the care of your patient.      Sincerely,     LETICIA Sullivan CNP     [facility]  cc:   LETICIA Velazquez CNP  [address]  [place] [address]      "

## 2025-04-21 NOTE — PATIENT INSTRUCTIONS
It was nice to meet you.    Thank you for your visit with the [facility] today.    Recommendations discussed today:     STOP lisinopril to give more room in blood pressure     INCREASE Imdur to 60mg to see if this helps any of the arm pain symptoms and given episode of chest pain    Schedule PAD screen of lower legs      Follow up plan:   - Continue cardiac rehab    - Follow up in cardiology clinic in 3-4 weeks       If you have questions or concerns in the meantime please call my nurse team at [phone] or send a P2i message for non urgent requests.       Scheduling phone number: [phone]  ________________________________    LETICIA Green, CNP    [facility]

## 2025-04-21 NOTE — PROGRESS NOTES
Cardiology Clinic Follow up     Fredy Chow MRN# [id number]   YOB: 1960 Age: 64 year old     Primary cardiologist: Dr. Calzada  (initial hospital consult)    Reason for visit 2 week follow up    History of presenting illness:    Fredy Chow is a pleasant 64 year old male with past medical history significant for HTN, CKD, hyperlipidemia, recent NSTEMI 3/17/2025 s/p PCI DILAN x2 to mid-distal LAD (serial lesions 80% and 100%); residual moderate disease in his RCA and possibly obstructive lesions in his smallish RPL RV marginal branches who was re-admitted on 3/25/2025 with NSTEMI. Presented with diaphoresis and chest pain relieved with nitroglycerin. HS troponin 270 (down from 337 on 3/17/25). Started on IV heparin. Echocardiogram showed stable normal LVEF and no effusion.  Underwent coronary angiogram, previous mid LAD DILAN patent with no restenosis s/p PCI DILAN to 2nd mid diag (80%). RV branch (90%) not intervened due to smaller system and renal insuffiencey/dye load. He was monitored over the weekend on antianginals and discharged home.    He subsequently presented back a day later on 3/31/25 with atypical chest pain and bilateral arm paresthesias which spontaneously improved. ECG with some ST/T wave abnormalities. He underwent repeat coronary angiogram on 4/1/25 which showed no change in coronary anatomy - patent mid LAD stent and 2nd diag stent. Distal LAD 90% stenosis and RV branch 90% stenosis. Recommendation for medical management.     Seen last in office with myself 4/7/25. Feeling a little lightheaded since hospital discharge. Blood pressure after cardiac rehab was running lower 90/62. Lisinopril reduced to 5mg. No chset pain. Occasional palpitations. Making diet changes.     Today, Fredy presents for follow up. Just got completed with cardiac rehab this morning (5th session). Resting /62, ending /60. Up to 3.0 METs. He had one episode of chest pain  this weekend he took 1 nitroglycerin for with relief. He often sleeps with his arms curled in and noticed some tingling to left arm and hand pain, this has come and gone.  No symptoms at cardiac rehab. No edema, occasional calf cramping. Has a bruise on left wrist from his watch. Lightheadedness better on less lisinopril.            Assessment and Plan:     ASSESSMENT:    CAD, recent NSTEMI s/p PCI DESx3 to mid-distal LAD (3/17/25), presented back with NSTEMI/USA s/p PCI to 2nd mid Diag (80%) 3/27/2025  -RV branch (90%) not intervened due to smaller system, antianginals were added. Presented back again on 3/31/25 with atypical chest pain and bilateral arm paresthesias which spontaneously improved. ECG with some ST/T wave abnormalities. He underwent repeat coronary angiogram on 4/1/25 which showed no change in coronary anatomy - patent mid LAD stent and 2nd diag stent. Distal LAD 90% stenosis and RV branch 90% stenosis. Recommendation for medical management.   -1 episode of chest pain this weekend relieved with 1 nitroglycerin. Some atypical left arm tinging/left hand discomfort, possibly positional, comes and goes.  No symptoms at cardiac rehab   -Continue dual antiplatelet medications with aspirin and Brilinta 90mg twice daily for 1 year (through March 2026)  -Additional antianginals added post PCI with amlodipine 5mg, metoprolol XL 75mg, imdur   -Increase Imdur to 60mg. Stop lisinopril for BP room.   -Avoid PDE5-i use while on Imdur. Continue imdur for now, consider trial off after 1 year  -Continue atorvastatin 40mg daily - consider increase to 80mg in follow up  -Continue cardiac rehab on Mondays     HTN  Orthostasis  -With addition of antianginals reduced lisinopril to 5mg - will stop lisinopril BP 99/64 today.  -Continue on amlodipine 5mg, Imdur 30mg, metoprolol XL 75mg  -Cautioned to ensure hydration, limit alcohol use to 1 standard beer or wine on occasion and possible increased alcohol  "effect    Hyperlipidemia  - (3/3/25) - -LDL goal < 55  -Continues on atorvastatin 40mg, consider dose increase to 80mg in follow up  -Repeat fasting lipids in 3 months     ANA/CKD  -Reduce lisinopril to 5mg  -Creatinine 1.38 (4/2/25)  -Repeat BMP with PCP visit upcoming       PLAN:     Stop Lisinopril   Increase Imdur to 60mg   Continue other current medications: aspirin and Brilinta 90mg twice daily for 1 year, amlodipine, metoprolol, Imdur, atorvastatin  Continue cardiac rehab on Mondays  PAD screen with ABIs in legs  Follow up 4 weeks           Michelle Max, DNP, APRN, CNP  [facility] [place]     Orders this Visit:  Orders Placed This Encounter   Procedures    US ORIN Doppler with Exercise Bilateral    Follow-Up with Cardiology- [facility]     Orders Placed This Encounter   Medications    isosorbide mononitrate (IMDUR) 60 MG 24 hr tablet     Sig: Take 1 tablet (60 mg) by mouth daily.     Dispense:  90 tablet     Refill:  1     Dose increase.     Medications Discontinued During This Encounter   Medication Reason    isosorbide mononitrate (IMDUR) 30 MG 24 hr tablet     lisinopril (ZESTRIL) 5 MG tablet        Today's clinic visit entailed:  Review of external notes as documented elsewhere in note  Review of the result(s) of each unique test - cardiac rehab notes,  BMP  Ordering of each unique test  Prescription drug management  The level of medical decision making during this visit was of moderate complexity.           Physical Exam:   Vitals: BP 99/64   Pulse 89   Ht 1.727 m (5' 8\")   Wt 89.8 kg (198 lb)   SpO2 98%   BMI 30.11 kg/m      Body mass index is 30.11 kg/m .  Wt Readings from Last 3 Encounters:   04/21/25 89.8 kg (198 lb)   04/07/25 90.6 kg (199 lb 12.8 oz)   04/02/25 90.3 kg (199 lb)        General :   Alert and oriented, in no acute distress.    Respiratory:   Respirations unlabored. Clear bilaterally with no rales, rhonchi, or wheezes.     Cardiovascular:   Rhythm is " regular. S1 and S2 are normal. No significant murmur is present. radial pulses normal    Extremities: Warm and dry, no lower edema   Neurologic: Moves all extremities, non focal    Psych:  Appropriate             Medications:     Current Outpatient Medications   Medication Sig Dispense Refill    amLODIPine (NORVASC) 5 MG tablet Take 1 tablet (5 mg) by mouth at bedtime. 90 tablet 4    aspirin (ASA) 81 MG chewable tablet Take 1 tablet (81 mg) by mouth daily. 90 tablet 3    atorvastatin (LIPITOR) 40 MG tablet Take 1 tablet (40 mg) by mouth daily. 90 tablet 4    isosorbide mononitrate (IMDUR) 60 MG 24 hr tablet Take 1 tablet (60 mg) by mouth daily. 90 tablet 1    metoprolol succinate ER (TOPROL XL) 25 MG 24 hr tablet Take 3 tablets (75 mg) by mouth daily. 90 tablet 4    ticagrelor (BRILINTA) 90 MG tablet Take 1 tablet (90 mg) by mouth 2 times daily. For heart stents. 180 tablet 4    nitroGLYcerin (NITROSTAT) 0.4 MG sublingual tablet For chest pain place 1 tablet under the tongue every 5 minutes for 3 doses. If symptoms persist 5 minutes after 1st dose call 911. (Patient not taking: Reported on 2025) 30 tablet 1       Family History   Problem Relation Age of Onset    Hypertension Mother     Heart Failure Father     Hypertension Father     Ovarian Cancer Sister     Cerebrovascular Disease Maternal Grandmother     Diabetes Paternal Grandfather     Colon Cancer No family hx of        Social History     Socioeconomic History    Marital status: Single     Spouse name: Not on file    Number of children: Not on file    Years of education: Not on file    Highest education level: Not on file   Occupational History    Not on file   Tobacco Use    Smoking status: Former     Current packs/day: 0.00     Types: Cigarettes     Quit date:      Years since quittin.3    Smokeless tobacco: Not on file   Substance and Sexual Activity    Alcohol use: Yes     Comment: occ wine    Drug use: No    Sexual activity: Not on file    Other Topics Concern    Parent/sibling w/ CABG, MI or angioplasty before 65F 55M? Not Asked   Social History Narrative    Not on file     Social Drivers of Health     Financial Resource Strain: Low Risk  (3/31/2025)    Financial Resource Strain     Within the past 12 months, have you or your family members you live with been unable to get utilities (heat, electricity) when it was really needed?: No   Food Insecurity: Low Risk  (3/31/2025)    Food Insecurity     Within the past 12 months, did you worry that your food would run out before you got money to buy more?: No     Within the past 12 months, did the food you bought just not last and you didn t have money to get more?: No   Transportation Needs: Low Risk  (3/31/2025)    Transportation Needs     Within the past 12 months, has lack of transportation kept you from medical appointments, getting your medicines, non-medical meetings or appointments, work, or from getting things that you need?: No   Physical Activity: Not on file   Stress: Not on file   Social Connections: Not on file   Interpersonal Safety: Low Risk  (3/27/2025)    Interpersonal Safety     Do you feel physically and emotionally safe where you currently live?: Yes     Within the past 12 months, have you been hit, slapped, kicked or otherwise physically hurt by someone?: No     Within the past 12 months, have you been humiliated or emotionally abused in other ways by your partner or ex-partner?: No   Housing Stability: Low Risk  (3/31/2025)    Housing Stability     Do you have housing? : Yes     Are you worried about losing your housing?: No          Past Medical History:     Past Medical History:   Diagnosis Date    Anal condyloma     diagnosed at approx age 26    Genital herpes     Schwannoma     diagnosed at age 32, excision performed            Past Surgical History:     Past Surgical History:   Procedure Laterality Date    BACK SURGERY  04/23/2002    CV CORONARY ANGIOGRAM N/A 3/17/2025     Procedure: Coronary Angiogram;  Surgeon: Juan Garrett MD;  Location: [facility] CARDIAC CATH LAB    CV CORONARY ANGIOGRAM N/A 3/27/2025    Procedure: Coronary Angiogram;  Surgeon: Jayden Thomason MD;  Location: [facility] CARDIAC CATH LAB    CV CORONARY ANGIOGRAM N/A 4/1/2025    Procedure: Coronary Angiogram;  Surgeon: Sukhdev Kunz MD;  Location: [facility] CARDIAC CATH LAB    CV INTRAVASULAR ULTRASOUND N/A 3/17/2025    Procedure: Intravascular Ultrasound;  Surgeon: Juan Garrett MD;  Location: [facility] CARDIAC CATH LAB    CV PCI N/A 3/17/2025    Procedure: Percutaneous Coronary Intervention;  Surgeon: Juan Garrett MD;  Location: [facility] CARDIAC CATH LAB    CV PCI N/A 3/27/2025    Procedure: Percutaneous Coronary Intervention;  Surgeon: Jayden Thomason MD;  Location: [facility] CARDIAC CATH LAB            Allergies:   Patient has no known allergies.       Data Reviewed today:   Most Recent Echocardiogram: 3/26/2025  Summary  Contrast was used without apparent complications. Normal transthoracic  echocardiogram. Compared to prior study, there is no significant change.    Left Ventricle  The left ventricle is normal in size. There is normal left ventricular wall  thickness. Left ventricular systolic function is normal. Normal left  ventricular wall motion. No evidence of left ventricular mass or tumors.    Coronary angiogram: 4/1/2025    Prox LAD lesion is 40% stenosed.    Mid Cx lesion is 35% stenosed.    Mid RCA lesion is 35% stenosed.    Prox RCA to Mid RCA lesion is 35% stenosed.    RV Branch lesion is 90% stenosed.    Dist LAD lesion is 90% stenosed.    1st Mrg lesion is 65% stenosed.     No change in coronary anatomy.  Recommend medical management    Left Main   The vessel is moderate in size. There was 0% vessel disease.      Left Anterior Descending   Prox LAD lesion is 40% stenosed.   Previously placed Mid LAD drug eluting stent is widely patent. Previous treatment took place <1 month  "ago. No thrombosis in the previous stent. No restenosis in the previous stent.   Dist LAD lesion is 90% stenosed.      Second Diagonal Branch   Previously placed 2nd Diag drug eluting stent is widely patent. The lesion is type A - low risk. The lesion was previously treated using angioplasty.      Left Circumflex   Mid Cx lesion is 35% stenosed.      First Obtuse Marginal Branch   1st Mrg lesion is 65% stenosed.      Right Coronary Artery   Prox RCA to Mid RCA lesion is 35% stenosed.   Mid RCA lesion is 35% stenosed.      Right Ventricular Branch   RV Branch lesion is 90% stenosed.          All laboratory data reviewed:    Recent Labs   Lab Test 03/16/25  1607   NTBNP 372       Lab Results   Component Value Date    WBC 12.3 (H) 03/31/2025    WBC 8.8 03/11/2010    RBC 4.37 (L) 03/31/2025    RBC 5.48 03/11/2010    HGB 13.9 03/31/2025    HGB 17.8 (H) 03/11/2010    HCT 38.0 (L) 03/31/2025    HCT 48.0 03/11/2010    MCV 87 03/31/2025    MCV 88 03/11/2010    MCH 31.8 03/31/2025    MCH 32.5 03/11/2010    MCHC 36.6 (H) 03/31/2025    MCHC 37.1 (H) 03/11/2010    RDW 12.0 03/31/2025    RDW 12.8 03/11/2010     03/31/2025     03/11/2010       Lab Results   Component Value Date     04/02/2025     03/11/2010    POTASSIUM 4.3 04/02/2025    POTASSIUM 3.4 03/11/2010    CHLORIDE 106 04/02/2025    CHLORIDE 104 03/11/2010    CO2 21 (L) 04/02/2025    CO2 28 03/11/2010    ANIONGAP 14 04/02/2025    ANIONGAP 8 03/11/2010     (H) 04/02/2025     (H) 03/27/2025     (H) 03/11/2010    BUN 25.6 (H) 04/02/2025    BUN 13 03/11/2010    CR 1.38 (H) 04/02/2025    CR 1.03 03/11/2010    GFRESTIMATED 57 (L) 04/02/2025    GFRESTIMATED 77 03/11/2010    GFRESTBLACK >90 03/11/2010    PATRIA 8.9 04/02/2025    PATRIA 9.2 03/11/2010      Lab Results   Component Value Date    AST 27 03/31/2025    ALT 36 03/31/2025       No results found for: \"A1C\"    The longitudinal plan of care for Fredy was addressed during this visit. " Due to the added complexity in care, I will continue to support Fredy in the subsequent management of this condition(s) and with the ongoing continuity of care of this condition(s).    This note has been dictated using voice recognition software. Any grammatical, typographical, or context distortions are unintentional and inherent to the software.

## 2025-04-23 ENCOUNTER — HOSPITAL ENCOUNTER (OUTPATIENT)
Dept: CARDIAC REHAB | Facility: CLINIC | Age: 65
Discharge: HOME | End: 2025-04-23
Attending: HOSPITALIST
Payer: COMMERCIAL

## 2025-04-23 PROCEDURE — 93798 PHYS/QHP OP CAR RHAB W/ECG: CPT

## 2025-04-28 ENCOUNTER — HOSPITAL ENCOUNTER (OUTPATIENT)
Dept: CARDIAC REHAB | Facility: CLINIC | Age: 65
Discharge: HOME | End: 2025-04-28
Attending: HOSPITALIST
Payer: COMMERCIAL

## 2025-04-28 PROCEDURE — 93798 PHYS/QHP OP CAR RHAB W/ECG: CPT

## 2025-05-07 ENCOUNTER — HOSPITAL ENCOUNTER (OUTPATIENT)
Dept: CARDIAC REHAB | Facility: CLINIC | Age: 65
Discharge: HOME | End: 2025-05-07
Attending: HOSPITALIST
Payer: COMMERCIAL

## 2025-05-07 PROCEDURE — 93798 PHYS/QHP OP CAR RHAB W/ECG: CPT | Performed by: REHABILITATION PRACTITIONER

## 2025-05-07 PROCEDURE — 93797 PHYS/QHP OP CAR RHAB WO ECG: CPT | Performed by: REHABILITATION PRACTITIONER

## 2025-05-12 ENCOUNTER — HOSPITAL ENCOUNTER (OUTPATIENT)
Dept: CARDIAC REHAB | Facility: CLINIC | Age: 65
Discharge: HOME | End: 2025-05-12
Attending: HOSPITALIST
Payer: COMMERCIAL

## 2025-05-12 PROCEDURE — 93798 PHYS/QHP OP CAR RHAB W/ECG: CPT | Performed by: REHABILITATION PRACTITIONER

## 2025-05-19 ENCOUNTER — HOSPITAL ENCOUNTER (OUTPATIENT)
Dept: CARDIAC REHAB | Facility: CLINIC | Age: 65
Discharge: HOME | End: 2025-05-19
Attending: HOSPITALIST
Payer: COMMERCIAL

## 2025-05-19 PROCEDURE — 93798 PHYS/QHP OP CAR RHAB W/ECG: CPT | Performed by: REHABILITATION PRACTITIONER

## 2025-06-02 ENCOUNTER — HOSPITAL ENCOUNTER (OUTPATIENT)
Dept: CARDIAC REHAB | Facility: CLINIC | Age: 65
Discharge: HOME | End: 2025-06-02
Attending: HOSPITALIST
Payer: COMMERCIAL

## 2025-06-02 PROCEDURE — 93798 PHYS/QHP OP CAR RHAB W/ECG: CPT | Performed by: OCCUPATIONAL THERAPIST

## 2025-06-09 ENCOUNTER — HOSPITAL ENCOUNTER (OUTPATIENT)
Dept: CARDIAC REHAB | Facility: CLINIC | Age: 65
Discharge: HOME | End: 2025-06-09
Attending: HOSPITALIST
Payer: COMMERCIAL

## 2025-06-09 PROCEDURE — 93798 PHYS/QHP OP CAR RHAB W/ECG: CPT

## 2025-06-12 ENCOUNTER — APPOINTMENT (OUTPATIENT)
Dept: URBAN - METROPOLITAN AREA CLINIC 255 | Age: 65
Setting detail: DERMATOLOGY
End: 2025-06-12

## 2025-06-12 VITALS — HEIGHT: 68 IN | WEIGHT: 194.89 LBS

## 2025-06-12 DIAGNOSIS — D22 MELANOCYTIC NEVI: ICD-10-CM

## 2025-06-12 DIAGNOSIS — L91.8 OTHER HYPERTROPHIC DISORDERS OF THE SKIN: ICD-10-CM

## 2025-06-12 DIAGNOSIS — L82.1 OTHER SEBORRHEIC KERATOSIS: ICD-10-CM

## 2025-06-12 DIAGNOSIS — D18.0 HEMANGIOMA: ICD-10-CM

## 2025-06-12 DIAGNOSIS — Z71.89 OTHER SPECIFIED COUNSELING: ICD-10-CM

## 2025-06-12 DIAGNOSIS — L57.8 OTHER SKIN CHANGES DUE TO CHRONIC EXPOSURE TO NONIONIZING RADIATION: ICD-10-CM

## 2025-06-12 PROBLEM — D22.61 MELANOCYTIC NEVI OF RIGHT UPPER LIMB, INCLUDING SHOULDER: Status: ACTIVE | Noted: 2025-06-12

## 2025-06-12 PROBLEM — D22.71 MELANOCYTIC NEVI OF RIGHT LOWER LIMB, INCLUDING HIP: Status: ACTIVE | Noted: 2025-06-12

## 2025-06-12 PROBLEM — D18.01 HEMANGIOMA OF SKIN AND SUBCUTANEOUS TISSUE: Status: ACTIVE | Noted: 2025-06-12

## 2025-06-12 PROBLEM — D22.5 MELANOCYTIC NEVI OF TRUNK: Status: ACTIVE | Noted: 2025-06-12

## 2025-06-12 PROBLEM — D22.72 MELANOCYTIC NEVI OF LEFT LOWER LIMB, INCLUDING HIP: Status: ACTIVE | Noted: 2025-06-12

## 2025-06-12 PROBLEM — D22.62 MELANOCYTIC NEVI OF LEFT UPPER LIMB, INCLUDING SHOULDER: Status: ACTIVE | Noted: 2025-06-12

## 2025-06-12 PROCEDURE — OTHER MIPS QUALITY: OTHER

## 2025-06-12 PROCEDURE — OTHER PATIENT SPECIFIC COUNSELING: OTHER

## 2025-06-12 PROCEDURE — OTHER COUNSELING: OTHER

## 2025-06-12 PROCEDURE — OTHER SUNSCREEN RECOMMENDATIONS: OTHER

## 2025-06-12 ASSESSMENT — LOCATION DETAILED DESCRIPTION DERM
LOCATION DETAILED: RIGHT VENTRAL PROXIMAL FOREARM
LOCATION DETAILED: LEFT VENTRAL PROXIMAL FOREARM
LOCATION DETAILED: RIGHT VENTRAL DISTAL FOREARM
LOCATION DETAILED: RIGHT ANTERIOR DISTAL THIGH
LOCATION DETAILED: RIGHT PROXIMAL DORSAL FOREARM
LOCATION DETAILED: LEFT ANTECUBITAL SKIN
LOCATION DETAILED: RIGHT ANTERIOR PROXIMAL THIGH
LOCATION DETAILED: INFERIOR THORACIC SPINE
LOCATION DETAILED: RIGHT DISTAL DORSAL FOREARM
LOCATION DETAILED: LEFT DISTAL DORSAL FOREARM
LOCATION DETAILED: EPIGASTRIC SKIN
LOCATION DETAILED: LEFT ANTERIOR DISTAL THIGH
LOCATION DETAILED: LEFT INFERIOR LATERAL MIDBACK
LOCATION DETAILED: RIGHT INGUINAL CREASE
LOCATION DETAILED: LEFT MEDIAL UPPER BACK
LOCATION DETAILED: LEFT ANTERIOR PROXIMAL THIGH
LOCATION DETAILED: MIDDLE STERNUM
LOCATION DETAILED: LEFT INFERIOR CENTRAL MALAR CHEEK
LOCATION DETAILED: LEFT VENTRAL DISTAL FOREARM

## 2025-06-12 ASSESSMENT — LOCATION SIMPLE DESCRIPTION DERM
LOCATION SIMPLE: RIGHT THIGH
LOCATION SIMPLE: LEFT LOWER BACK
LOCATION SIMPLE: RIGHT FOREARM
LOCATION SIMPLE: UPPER BACK
LOCATION SIMPLE: LEFT CHEEK
LOCATION SIMPLE: LEFT UPPER ARM
LOCATION SIMPLE: GROIN
LOCATION SIMPLE: LEFT FOREARM
LOCATION SIMPLE: ABDOMEN
LOCATION SIMPLE: LEFT THIGH
LOCATION SIMPLE: CHEST
LOCATION SIMPLE: LEFT UPPER BACK

## 2025-06-12 ASSESSMENT — LOCATION ZONE DERM
LOCATION ZONE: ARM
LOCATION ZONE: TRUNK
LOCATION ZONE: FACE
LOCATION ZONE: LEG

## 2025-06-16 ENCOUNTER — HOSPITAL ENCOUNTER (OUTPATIENT)
Dept: CARDIAC REHAB | Facility: CLINIC | Age: 65
Discharge: HOME | End: 2025-06-16
Attending: HOSPITALIST
Payer: COMMERCIAL

## 2025-06-16 PROCEDURE — 93798 PHYS/QHP OP CAR RHAB W/ECG: CPT

## 2025-06-23 ENCOUNTER — HOSPITAL ENCOUNTER (OUTPATIENT)
Dept: CARDIAC REHAB | Facility: CLINIC | Age: 65
Discharge: HOME | End: 2025-06-23
Attending: HOSPITALIST
Payer: COMMERCIAL

## 2025-06-23 PROCEDURE — 93798 PHYS/QHP OP CAR RHAB W/ECG: CPT

## 2025-06-30 ENCOUNTER — HOSPITAL ENCOUNTER (OUTPATIENT)
Dept: CARDIAC REHAB | Facility: CLINIC | Age: 65
Discharge: HOME | End: 2025-06-30
Attending: HOSPITALIST
Payer: COMMERCIAL

## 2025-06-30 PROCEDURE — 93798 PHYS/QHP OP CAR RHAB W/ECG: CPT

## 2025-07-07 ENCOUNTER — HOSPITAL ENCOUNTER (OUTPATIENT)
Dept: CARDIAC REHAB | Facility: CLINIC | Age: 65
Discharge: HOME | End: 2025-07-07
Attending: HOSPITALIST
Payer: COMMERCIAL

## 2025-07-07 PROCEDURE — 93798 PHYS/QHP OP CAR RHAB W/ECG: CPT | Performed by: CLINICAL EXERCISE PHYSIOLOGIST

## 2025-07-14 ENCOUNTER — HOSPITAL ENCOUNTER (OUTPATIENT)
Dept: CARDIAC REHAB | Facility: CLINIC | Age: 65
Discharge: HOME | End: 2025-07-14
Attending: HOSPITALIST
Payer: COMMERCIAL

## 2025-07-14 PROCEDURE — 93798 PHYS/QHP OP CAR RHAB W/ECG: CPT | Performed by: OCCUPATIONAL THERAPIST

## 2025-07-21 ENCOUNTER — HOSPITAL ENCOUNTER (OUTPATIENT)
Dept: CARDIAC REHAB | Facility: CLINIC | Age: 65
Discharge: HOME | End: 2025-07-21
Attending: HOSPITALIST
Payer: COMMERCIAL

## 2025-07-21 PROCEDURE — 93798 PHYS/QHP OP CAR RHAB W/ECG: CPT

## 2025-07-21 PROCEDURE — 93797 PHYS/QHP OP CAR RHAB WO ECG: CPT

## 2025-08-11 ENCOUNTER — TELEPHONE (OUTPATIENT)
Dept: CARDIOLOGY | Facility: CLINIC | Age: 65
End: 2025-08-11
Payer: COMMERCIAL

## (undated) DEVICE — TOTE ANGIO CORP PC15AT SAN32CC83O

## (undated) DEVICE — SHIELD STERADIAN ATTENUATION PAD

## (undated) DEVICE — MANIFOLD KIT ANGIO AUTOMATED 014613

## (undated) DEVICE — RAD G/W INQWIRE .035X260CM J-TIP EXCHANGE IQ35F260J1O5RS

## (undated) DEVICE — KIT HAND CONTROL ANGIOTOUCH ACIST 65CM AT-P65

## (undated) DEVICE — SYR ANGIO NAMIC CNTRSTINJ ROT ADPT RSVR PALM PD THB GRP FNGR

## (undated) DEVICE — INTRODUCER SHEATH GREEN 6.5FRX11CM .038IN PSI-6F-11-038ACT

## (undated) DEVICE — CATH DIAG 4FR JL 4.5 538417

## (undated) DEVICE — CATH GD VISTA BRITE BLU YLW 100CM 6FR XB3.5 6700540E

## (undated) DEVICE — NDL PERC ENTRY THINWALL 18GA 7.0" G00166

## (undated) DEVICE — CATH ANGIO JUDKINS JL3.5 6FRX100CM INFINITI 534618T

## (undated) DEVICE — INFL DVC BASIXCOMPAK PLYCRB 30 ATM 13IN 20ML IN4530

## (undated) DEVICE — Device

## (undated) DEVICE — SLEEVE TR BAND RADIAL COMPRESSION DEVICE 24CM TRB24-REG

## (undated) DEVICE — GUIDEWIRE VASC 0.014"X190CML 3CML TIP AHW14R104S

## (undated) DEVICE — INTRO SHEATH 4FRX10CM PINNACLE RSS402

## (undated) DEVICE — CATH BALLOON NC EMERGE 2.50X20MM H7493926720250

## (undated) DEVICE — KIT LG BORE TOUHY ACCESS PLUS MAP152

## (undated) DEVICE — WIRE GUIDE 0.035"X260CM SAFE-T-J EXCHANGE G00517

## (undated) DEVICE — CATH ANGIO INFINITI 3DRC 4FRX100CM 538476

## (undated) DEVICE — ENDO SNARE POLYPECTOMY OVAL 15MM LOOP SD-240U-15

## (undated) DEVICE — DEFIB PRO-PADZ LVP LQD GEL ADULT 8900-2105-01

## (undated) DEVICE — CATH IVUS OPTICROSS HD 6 3.6FR 1.18MM DIA 135CML H7493935408

## (undated) DEVICE — CATH BALLOON NC EMERGE 2.50X12MM H7493926712250

## (undated) DEVICE — CATH DIAGNOSTIC RADIAL 5FR TIG 4.0

## (undated) DEVICE — BAG STERILE DISPOSABLE FOR PERMANENT SLED 39315-010

## (undated) DEVICE — VALVE HEMOSTASIS GUARDIAN II OD8 FR GUIDEWIRE 8215

## (undated) DEVICE — GUIDEWIRE VASC 0.014INX180CM RUNTHROUGH 25-1011

## (undated) DEVICE — NDL PERC ENTRY THINWALL 18GA 9.0" G00273

## (undated) DEVICE — CATH BALLOON EMERGE 2.0X12MM H7493918912200

## (undated) DEVICE — CATH BALLOON NC EMERGE 3.50X20MM H7493926720350

## (undated) DEVICE — CATH BALLOON NC EMERGE 2.00X12MM H7493926712200

## (undated) DEVICE — CATH GUIDING BLUE YELLOW PTFE JL4 6FRX100CM 67000400

## (undated) DEVICE — INTRO GLIDESHEATH SLENDER 6FR 10X45CM 60-1060

## (undated) DEVICE — RAD INFLATOR BASIC COMPAK  IN4130

## (undated) DEVICE — CATH BALLOON NC EMERGE 2.75X12MM H7493926712270

## (undated) DEVICE — KIT ENDO TURNOVER/PROCEDURE W/CLEAN A SCOPE LINERS 103888

## (undated) RX ORDER — HEPARIN SODIUM 200 [USP'U]/100ML
INJECTION, SOLUTION INTRAVENOUS
Status: DISPENSED
Start: 2025-03-27

## (undated) RX ORDER — NITROGLYCERIN 5 MG/ML
VIAL (ML) INTRAVENOUS
Status: DISPENSED
Start: 2025-03-17

## (undated) RX ORDER — HEPARIN SODIUM 200 [USP'U]/100ML
INJECTION, SOLUTION INTRAVENOUS
Status: DISPENSED
Start: 2025-04-01

## (undated) RX ORDER — HEPARIN SODIUM 200 [USP'U]/100ML
INJECTION, SOLUTION INTRAVENOUS
Status: DISPENSED
Start: 2025-03-17

## (undated) RX ORDER — HEPARIN SODIUM 1000 [USP'U]/ML
INJECTION, SOLUTION INTRAVENOUS; SUBCUTANEOUS
Status: DISPENSED
Start: 2025-03-17

## (undated) RX ORDER — LIDOCAINE HYDROCHLORIDE 10 MG/ML
INJECTION, SOLUTION EPIDURAL; INFILTRATION; INTRACAUDAL; PERINEURAL
Status: DISPENSED
Start: 2025-03-17

## (undated) RX ORDER — HEPARIN SODIUM 1000 [USP'U]/ML
INJECTION, SOLUTION INTRAVENOUS; SUBCUTANEOUS
Status: DISPENSED
Start: 2025-03-27

## (undated) RX ORDER — FENTANYL CITRATE 50 UG/ML
INJECTION, SOLUTION INTRAMUSCULAR; INTRAVENOUS
Status: DISPENSED
Start: 2025-03-27

## (undated) RX ORDER — LIDOCAINE HYDROCHLORIDE 10 MG/ML
INJECTION, SOLUTION EPIDURAL; INFILTRATION; INTRACAUDAL; PERINEURAL
Status: DISPENSED
Start: 2025-03-27

## (undated) RX ORDER — FENTANYL CITRATE 50 UG/ML
INJECTION, SOLUTION INTRAMUSCULAR; INTRAVENOUS
Status: DISPENSED
Start: 2025-03-17

## (undated) RX ORDER — HEPARIN SODIUM 1000 [USP'U]/ML
INJECTION, SOLUTION INTRAVENOUS; SUBCUTANEOUS
Status: DISPENSED
Start: 2025-04-01

## (undated) RX ORDER — FENTANYL CITRATE 50 UG/ML
INJECTION, SOLUTION INTRAMUSCULAR; INTRAVENOUS
Status: DISPENSED
Start: 2017-06-06

## (undated) RX ORDER — VERAPAMIL HYDROCHLORIDE 2.5 MG/ML
INJECTION, SOLUTION INTRAVENOUS
Status: DISPENSED
Start: 2025-03-17

## (undated) RX ORDER — LIDOCAINE HYDROCHLORIDE 10 MG/ML
INJECTION, SOLUTION EPIDURAL; INFILTRATION; INTRACAUDAL; PERINEURAL
Status: DISPENSED
Start: 2025-04-01

## (undated) RX ORDER — FENTANYL CITRATE 50 UG/ML
INJECTION, SOLUTION INTRAMUSCULAR; INTRAVENOUS
Status: DISPENSED
Start: 2025-04-01

## (undated) RX ORDER — NITROGLYCERIN 5 MG/ML
VIAL (ML) INTRAVENOUS
Status: DISPENSED
Start: 2025-03-27